# Patient Record
Sex: FEMALE | Race: WHITE | Employment: OTHER | URBAN - METROPOLITAN AREA
[De-identification: names, ages, dates, MRNs, and addresses within clinical notes are randomized per-mention and may not be internally consistent; named-entity substitution may affect disease eponyms.]

---

## 2017-06-01 ENCOUNTER — TELEPHONE (OUTPATIENT)
Dept: NEUROLOGY | Facility: CLINIC | Age: 82
End: 2017-06-01

## 2017-06-01 NOTE — TELEPHONE ENCOUNTER
Pt dtg called about injection to breast. Pt dtg may have been calling regarding trigger point injection that was done to left chest wall in Dec 2016. Message left for Frances Duran to return call.

## 2017-06-01 NOTE — TELEPHONE ENCOUNTER
Dtg called stated that mother had trigger point injection in December that did not help left chest wall pain at all. Dtg stated that pt did want to \"hurt MD feels so has been in constant pain since December.  Pt and dtg want to know if anything else can be

## 2019-05-01 ENCOUNTER — HOSPITAL ENCOUNTER (OUTPATIENT)
Dept: ULTRASOUND IMAGING | Age: 84
Discharge: HOME | End: 2019-05-01
Attending: UROLOGY
Payer: COMMERCIAL

## 2019-05-01 DIAGNOSIS — N13.9 ACUTE UNILATERAL OBSTRUCTIVE UROPATHY: ICD-10-CM

## 2019-05-01 PROCEDURE — 76857 US EXAM PELVIC LIMITED: CPT | Performed by: UROLOGY

## 2019-05-21 ENCOUNTER — LAB REQUISITION (OUTPATIENT)
Dept: LAB | Facility: HOSPITAL | Age: 84
End: 2019-05-21
Payer: COMMERCIAL

## 2019-05-21 DIAGNOSIS — N39.0 URINARY TRACT INFECTION: ICD-10-CM

## 2019-05-21 PROCEDURE — 87186 SC STD MICRODIL/AGAR DIL: CPT | Performed by: UROLOGY

## 2019-05-21 PROCEDURE — 87088 URINE BACTERIA CULTURE: CPT | Performed by: UROLOGY

## 2019-05-21 PROCEDURE — 87086 URINE CULTURE/COLONY COUNT: CPT | Performed by: UROLOGY

## 2019-06-10 ENCOUNTER — TELEPHONE (OUTPATIENT)
Dept: PAIN CLINIC | Facility: HOSPITAL | Age: 84
End: 2019-06-10

## 2019-06-21 ENCOUNTER — OFFICE VISIT (OUTPATIENT)
Dept: PAIN CLINIC | Facility: HOSPITAL | Age: 84
End: 2019-06-21
Attending: ANESTHESIOLOGY
Payer: COMMERCIAL

## 2019-06-21 VITALS
DIASTOLIC BLOOD PRESSURE: 74 MMHG | WEIGHT: 160 LBS | HEART RATE: 67 BPM | SYSTOLIC BLOOD PRESSURE: 136 MMHG | BODY MASS INDEX: 28.35 KG/M2 | HEIGHT: 63 IN

## 2019-06-21 DIAGNOSIS — M79.10 MYALGIA: Primary | ICD-10-CM

## 2019-06-21 DIAGNOSIS — R07.89 LEFT-SIDED CHEST WALL PAIN: ICD-10-CM

## 2019-06-21 PROCEDURE — 99201 HC OUTPT EVAL AND MGNT NEW PT LEVEL 1: CPT

## 2019-06-21 NOTE — PROGRESS NOTES
Presents ambulatory to [facility] for evaluation of left chest wall pain,Referred by PCP . reports pain was caused after she had a cyst removed, \"they cut the nerve\". Reports pain as 8/10, \"nothing helps and nobody has been able to help me\".  She rep

## 2019-06-21 NOTE — CHRONIC PAIN
[facility] HOSP - [facility]  Report of Consultation    Charleen Wang Patient Status:  Outpatient    1934 MRN [id number]   Location [address] [facility] Attending Natalia Gutierrez MD   Hosp Day # 0 PCP Nahid Beach MD

## 2019-06-28 ENCOUNTER — DOCUMENTATION ONLY (OUTPATIENT)
Dept: PAIN CLINIC | Facility: HOSPITAL | Age: 84
End: 2019-06-28

## 2019-06-28 NOTE — PROGRESS NOTES
Procedure code 724 [id number] [id number] for 07/19/19    Nikki Sotomayor @ # [phone] @ 8:36am    Per Automated system no PA needed     Order form sent to the surgery center, confirmation rcv'd

## 2019-08-02 ENCOUNTER — OFFICE VISIT (OUTPATIENT)
Dept: PAIN CLINIC | Facility: HOSPITAL | Age: 84
End: 2019-08-02
Attending: ANESTHESIOLOGY
Payer: COMMERCIAL

## 2019-08-02 DIAGNOSIS — R07.89 LEFT-SIDED CHEST WALL PAIN: Primary | ICD-10-CM

## 2019-08-02 DIAGNOSIS — G58.8 INTERCOSTAL NEURALGIA: ICD-10-CM

## 2019-08-02 PROCEDURE — 99211 OFF/OP EST MAY X REQ PHY/QHP: CPT

## 2019-08-02 NOTE — PROGRESS NOTES
Pt presents to [facility] ambulatory  For f/u after a left chest intercostal nerve block on 07/19/19 with Dr. Rita Joya. Pt states she has 0% relief after injection.  Pt states burning sensation is still presents worst at night and ok in the am. Pt doesn't take medi

## 2019-08-02 NOTE — CHRONIC PAIN
Initial Consultation Note      HISTORY OF PRESENT ILLNESS:  Kevin Vargas is a 80year old old female referred to the pain clinic  for valuation treatment of left intercostal neuralgia eric is a very nice 80-year-old white female who is had a many Social Needs      Financial resource strain: Not on file      Food insecurity:        Worry: Not on file        Inability: Not on file      Transportation needs:        Medical: Not on file        Non-medical: Not on file    Tobacco Use      Smoking status x3  Affect:  NAD  Head: normocephalic, atraumatic  Eyes: anicteric; no injection  Chest: S1, S2, RRR  Respiratory: CTAB  Gait: Normal; cane user - No  Spine: Normal    ROM:   Lumbar spine  Flexion   Extension  Cervical Spine  Flexion   Extension  MOTOR EXA were discussed at length including pharmacotherapy (eg. Anti- inflammatories, muscle relaxants, neuropathic medications, oral steroids, analgesics), injections, and further testing.  Risks and benefits of all options were discussed at length to patients sat

## 2020-01-09 ENCOUNTER — OFFICE VISIT (OUTPATIENT)
Dept: SURGERY | Facility: CLINIC | Age: 85
End: 2020-01-09
Payer: MEDICARE

## 2020-01-09 VITALS
BODY MASS INDEX: 28 KG/M2 | WEIGHT: 160 LBS | HEART RATE: 80 BPM | SYSTOLIC BLOOD PRESSURE: 139 MMHG | DIASTOLIC BLOOD PRESSURE: 75 MMHG

## 2020-01-09 DIAGNOSIS — N32.81 OVERACTIVE BLADDER: Primary | ICD-10-CM

## 2020-01-09 PROCEDURE — G0463 HOSPITAL OUTPT CLINIC VISIT: HCPCS | Performed by: UROLOGY

## 2020-01-09 PROCEDURE — 99204 OFFICE O/P NEW MOD 45 MIN: CPT | Performed by: UROLOGY

## 2020-01-09 RX ORDER — LORAZEPAM 0.5 MG/1
TABLET ORAL
COMMUNITY
Start: 2020-01-02 | End: 2020-10-12

## 2020-01-09 NOTE — PROGRESS NOTES
SUBJECTIVE:  Gege Rodriguez is a 80year old female who presents for a consultation at the request of, and a copy of this note will be sent to, DR. Gamboa, for evaluation of  overactive bladder and ? Pelvic organ prolapse.   She states she had a bladder for pain or chest discomfort, dizziness or fainting, palpitations, leg swelling, nocturia, or claudication. GASTROINTESTINAL:  Negative for nausea, vomiting, diarrhea, constipation, heartburn or indigestion, abdominal pains, bloody or tarry stools.   Leticia Sparks Visit:  Requested Prescriptions      No prescriptions requested or ordered in this encounter       Imaging & Referrals:  None

## 2020-02-04 ENCOUNTER — OFFICE VISIT (OUTPATIENT)
Dept: OBGYN CLINIC | Facility: CLINIC | Age: 85
End: 2020-02-04
Payer: MEDICARE

## 2020-02-04 VITALS
SYSTOLIC BLOOD PRESSURE: 97 MMHG | BODY MASS INDEX: 28 KG/M2 | HEART RATE: 94 BPM | DIASTOLIC BLOOD PRESSURE: 63 MMHG | WEIGHT: 159.19 LBS

## 2020-02-04 DIAGNOSIS — N81.11 CYSTOCELE, MIDLINE: Primary | ICD-10-CM

## 2020-02-04 PROCEDURE — 57160 INSERT PESSARY/OTHER DEVICE: CPT | Performed by: OBSTETRICS & GYNECOLOGY

## 2020-02-04 PROCEDURE — A4561 PESSARY RUBBER, ANY TYPE: HCPCS | Performed by: OBSTETRICS & GYNECOLOGY

## 2020-02-04 PROCEDURE — 99203 OFFICE O/P NEW LOW 30 MIN: CPT | Performed by: OBSTETRICS & GYNECOLOGY

## 2020-02-05 NOTE — PROCEDURES
Pessary    Consent signed. Procedure discussed with patient in detail including indication, risk, benefits, alternatives and complications.       Procedure:  Pessary Type: Ring with support  Size: 3    Rectocele Grade 2  Cystocele Grade 3  Enterocele Grade

## 2020-02-05 NOTE — PROGRESS NOTES
Eliud Patiño is a 80year old female [id number] No LMP recorded. Patient has had a hysterectomy.  Patient presents with:  Urinary Symptoms: C/o bladder leak per Dr. Daisy Carrillo -- urge incontience; occurs if sits for long time & sudden leakage; tried meds but [employer] service: Not on file        Active member of club or organization: Not on file        Attends meetings of clubs or organizations: Not on file        Relationship status: Not on file      Intimate partner violence:        Fear of current or ex par thirst or urination. Heme/Lymph:    denies history of anemia, easy bruising or bleeding.       PHYSICAL EXAM:   BP 97/63   Pulse 94   Wt 159 lb 3.2 oz (72.2 kg)   BMI 28.20 kg/m²   Constitutional:   well developed, well nourished  Head/Face:  normocephalic

## 2020-02-18 ENCOUNTER — HOSPITAL ENCOUNTER (OUTPATIENT)
Dept: MRI IMAGING | Age: 85
Discharge: HOME | End: 2020-02-18
Attending: PAIN MEDICINE
Payer: MEDICARE

## 2020-02-18 DIAGNOSIS — M79.2 NEURITIS: ICD-10-CM

## 2020-02-18 PROCEDURE — 72146 MRI CHEST SPINE W/O DYE: CPT | Performed by: PAIN MEDICINE

## 2020-02-25 ENCOUNTER — LAB ENCOUNTER (OUTPATIENT)
Dept: LAB | Facility: HOSPITAL | Age: 85
End: 2020-02-25
Attending: PAIN MEDICINE
Payer: MEDICARE

## 2020-02-25 ENCOUNTER — OFFICE VISIT (OUTPATIENT)
Dept: OBGYN CLINIC | Facility: CLINIC | Age: 85
End: 2020-02-25
Payer: MEDICARE

## 2020-02-25 VITALS — HEART RATE: 93 BPM | SYSTOLIC BLOOD PRESSURE: 128 MMHG | DIASTOLIC BLOOD PRESSURE: 73 MMHG

## 2020-02-25 DIAGNOSIS — M25.50 PAIN IN JOINT, MULTIPLE SITES: Primary | ICD-10-CM

## 2020-02-25 DIAGNOSIS — N81.11 CYSTOCELE, MIDLINE: Primary | ICD-10-CM

## 2020-02-25 DIAGNOSIS — N39.45 CONTINUOUS LEAKAGE OF URINE: ICD-10-CM

## 2020-02-25 LAB
BASOPHILS # BLD AUTO: 0.04 X10(3) UL (ref 0–0.2)
BASOPHILS NFR BLD AUTO: 0.5 %
DEPRECATED RDW RBC AUTO: 41.3 FL (ref 35.1–46.3)
EOSINOPHIL # BLD AUTO: 0.08 X10(3) UL (ref 0–0.7)
EOSINOPHIL NFR BLD AUTO: 1 %
ERYTHROCYTE [DISTWIDTH] IN BLOOD BY AUTOMATED COUNT: 12.6 % (ref 11–15)
ERYTHROCYTE [SEDIMENTATION RATE] IN BLOOD: 11 MM/HR (ref 0–30)
HCT VFR BLD AUTO: 42.6 % (ref 35–48)
HGB BLD-MCNC: 14.2 G/DL (ref 12–16)
IMM GRANULOCYTES # BLD AUTO: 0.03 X10(3) UL (ref 0–1)
IMM GRANULOCYTES NFR BLD: 0.4 %
LYMPHOCYTES # BLD AUTO: 2.01 X10(3) UL (ref 1–4)
LYMPHOCYTES NFR BLD AUTO: 25.5 %
MCH RBC QN AUTO: 29.8 PG (ref 26–34)
MCHC RBC AUTO-ENTMCNC: 33.3 G/DL (ref 31–37)
MCV RBC AUTO: 89.5 FL (ref 80–100)
MONOCYTES # BLD AUTO: 0.5 X10(3) UL (ref 0.1–1)
MONOCYTES NFR BLD AUTO: 6.3 %
NEUTROPHILS # BLD AUTO: 5.23 X10 (3) UL (ref 1.5–7.7)
NEUTROPHILS # BLD AUTO: 5.23 X10(3) UL (ref 1.5–7.7)
NEUTROPHILS NFR BLD AUTO: 66.3 %
PLATELET # BLD AUTO: 405 10(3)UL (ref 150–450)
RBC # BLD AUTO: 4.76 X10(6)UL (ref 3.8–5.3)
WBC # BLD AUTO: 7.9 X10(3) UL (ref 4–11)

## 2020-02-25 PROCEDURE — 85025 COMPLETE CBC W/AUTO DIFF WBC: CPT

## 2020-02-25 PROCEDURE — 99213 OFFICE O/P EST LOW 20 MIN: CPT | Performed by: OBSTETRICS & GYNECOLOGY

## 2020-02-25 PROCEDURE — 85652 RBC SED RATE AUTOMATED: CPT

## 2020-02-25 PROCEDURE — 36415 COLL VENOUS BLD VENIPUNCTURE: CPT

## 2020-02-25 NOTE — PROGRESS NOTES
Enio Piedra is a 80year old female [id number] No LMP recorded. Patient has had a hysterectomy. Patient presents with:  Gyn Problem: PESSARY CHECK -- did not help. Does not feel any difference. [facility] sent pt to me -- wanted to do botox  .      OBSTETR file        Attends meetings of clubs or organizations: Not on file        Relationship status: Not on file      Intimate partner violence:        Fear of current or ex partner: Not on file        Emotionally abused: Not on file        Physically abused: N bleeding.       PHYSICAL EXAM:   /73   Pulse 93   Constitutional:   well developed, well nourished  Head/Face:  normocephalic  Abdomen:    soft, nontender, nondistended, no masses  Skin/Hair:   no unusual rashes or bruises  Extremities:   no edema, no

## 2020-03-16 ENCOUNTER — OFFICE VISIT (OUTPATIENT)
Dept: UROLOGY | Facility: HOSPITAL | Age: 85
End: 2020-03-16
Attending: OBSTETRICS & GYNECOLOGY
Payer: MEDICARE

## 2020-03-16 VITALS
BODY MASS INDEX: 28.17 KG/M2 | SYSTOLIC BLOOD PRESSURE: 122 MMHG | WEIGHT: 159 LBS | HEIGHT: 63 IN | DIASTOLIC BLOOD PRESSURE: 70 MMHG

## 2020-03-16 DIAGNOSIS — N81.84 PELVIC MUSCLE WASTING: Primary | ICD-10-CM

## 2020-03-16 DIAGNOSIS — N39.41 URGE INCONTINENCE: ICD-10-CM

## 2020-03-16 DIAGNOSIS — N39.3 FEMALE STRESS INCONTINENCE: ICD-10-CM

## 2020-03-16 DIAGNOSIS — N99.3 PROLAPSE OF VAGINAL VAULT AFTER HYSTERECTOMY: ICD-10-CM

## 2020-03-16 DIAGNOSIS — N95.2 POSTMENOPAUSAL ATROPHIC VAGINITIS: ICD-10-CM

## 2020-03-16 LAB
BILIRUB UR QL: NEGATIVE
CLARITY UR: CLEAR
COLOR UR: YELLOW
CONTROL RUN WITHIN 24 HOURS?: YES
GLUCOSE UR-MCNC: NEGATIVE MG/DL
KETONES UR-MCNC: NEGATIVE MG/DL
NITRITE UR QL STRIP.AUTO: NEGATIVE
NITRITE URINE: NEGATIVE
PH UR: 5 [PH] (ref 5–8)
PROT UR-MCNC: NEGATIVE MG/DL
RBC #/AREA URNS AUTO: 1 /HPF
SP GR UR STRIP: 1.02 (ref 1–1.03)
UROBILINOGEN UR STRIP-ACNC: <2
WBC #/AREA URNS AUTO: 7 /HPF

## 2020-03-16 PROCEDURE — 81001 URINALYSIS AUTO W/SCOPE: CPT | Performed by: OBSTETRICS & GYNECOLOGY

## 2020-03-16 PROCEDURE — 87086 URINE CULTURE/COLONY COUNT: CPT | Performed by: OBSTETRICS & GYNECOLOGY

## 2020-03-16 PROCEDURE — 99212 OFFICE O/P EST SF 10 MIN: CPT

## 2020-03-16 NOTE — PROGRESS NOTES
Juan Alberto Cam,   3/16/2020     Referred by Dr. Diana Lopez  Pt here with self    Patient presents with:  Prolapse: referred by Dr. Diana Lopez      HPI:  +RADHA  +UUI  +prolapse  hyst in past  Tried pessary with Ghia, worsened leakage (ring w/ support)  Not sexually a CP  No SOB    GENERAL EXAM:  GENERAL:  Alert and Oriented, and NAD  HEENT:  Normal, no lesions  LUNGS:  Normal effort  HEART:  RRR  ABDOMEN: soft, no mass, no hernia  EXTREM:  Normal, no edema  SKIN:  Normal, no lesions    PELVIC EXAM:  Ext.  Gen: some atro Discussed:  Estrace Cream    Treatment Plan, Non-surgical:   RN teaching/pt education done    Treatment Plan, Surgical:   Uterosacral suspension  Mid-urethral slings (Trans Vaginal Taping, TOT, single-incision)  Sacrocolpopexy-Abd, Robotic, LSC, Vag  Colpe

## 2020-03-18 ENCOUNTER — TELEPHONE (OUTPATIENT)
Dept: UROLOGY | Facility: HOSPITAL | Age: 85
End: 2020-03-18

## 2020-03-21 ENCOUNTER — APPOINTMENT (OUTPATIENT)
Dept: GENERAL RADIOLOGY | Facility: HOSPITAL | Age: 85
End: 2020-03-21
Payer: MEDICARE

## 2020-03-21 ENCOUNTER — HOSPITAL ENCOUNTER (EMERGENCY)
Facility: HOSPITAL | Age: 85
Discharge: HOME | End: 2020-03-21
Attending: EMERGENCY MEDICINE
Payer: MEDICARE

## 2020-03-21 VITALS
WEIGHT: 155 LBS | HEART RATE: 88 BPM | HEIGHT: 63 IN | OXYGEN SATURATION: 95 % | DIASTOLIC BLOOD PRESSURE: 70 MMHG | RESPIRATION RATE: 18 BRPM | BODY MASS INDEX: 27.46 KG/M2 | SYSTOLIC BLOOD PRESSURE: 130 MMHG | TEMPERATURE: 98 F

## 2020-03-21 DIAGNOSIS — J18.9 PNEUMONITIS: Primary | ICD-10-CM

## 2020-03-21 LAB
ANION GAP SERPL CALC-SCNC: 8 MMOL/L (ref 0–18)
BASOPHILS # BLD AUTO: 0.02 X10(3) UL (ref 0–0.2)
BASOPHILS NFR BLD AUTO: 0.2 %
BUN BLD-MCNC: 19 MG/DL (ref 7–18)
BUN/CREAT SERPL: 18.4 (ref 10–20)
CALCIUM BLD-MCNC: 9.4 MG/DL (ref 8.5–10.1)
CHLORIDE SERPL-SCNC: 108 MMOL/L (ref 98–112)
CO2 SERPL-SCNC: 24 MMOL/L (ref 21–32)
CREAT BLD-MCNC: 1.03 MG/DL (ref 0.55–1.02)
DEPRECATED RDW RBC AUTO: 42 FL (ref 35.1–46.3)
EOSINOPHIL # BLD AUTO: 0.06 X10(3) UL (ref 0–0.7)
EOSINOPHIL NFR BLD AUTO: 0.7 %
ERYTHROCYTE [DISTWIDTH] IN BLOOD BY AUTOMATED COUNT: 12.8 % (ref 11–15)
GLUCOSE BLD-MCNC: 138 MG/DL (ref 70–99)
HCT VFR BLD AUTO: 41.2 % (ref 35–48)
HGB BLD-MCNC: 14.1 G/DL (ref 12–16)
IMM GRANULOCYTES # BLD AUTO: 0.02 X10(3) UL (ref 0–1)
IMM GRANULOCYTES NFR BLD: 0.2 %
LYMPHOCYTES # BLD AUTO: 1.75 X10(3) UL (ref 1–4)
LYMPHOCYTES NFR BLD AUTO: 21.7 %
MCH RBC QN AUTO: 30.5 PG (ref 26–34)
MCHC RBC AUTO-ENTMCNC: 34.2 G/DL (ref 31–37)
MCV RBC AUTO: 89.2 FL (ref 80–100)
MONOCYTES # BLD AUTO: 0.58 X10(3) UL (ref 0.1–1)
MONOCYTES NFR BLD AUTO: 7.2 %
NEUTROPHILS # BLD AUTO: 5.62 X10 (3) UL (ref 1.5–7.7)
NEUTROPHILS # BLD AUTO: 5.62 X10(3) UL (ref 1.5–7.7)
NEUTROPHILS NFR BLD AUTO: 70 %
OSMOLALITY SERPL CALC.SUM OF ELEC: 294 MOSM/KG (ref 275–295)
PLATELET # BLD AUTO: 332 10(3)UL (ref 150–450)
POTASSIUM SERPL-SCNC: 3.6 MMOL/L (ref 3.5–5.1)
RBC # BLD AUTO: 4.62 X10(6)UL (ref 3.8–5.3)
SODIUM SERPL-SCNC: 140 MMOL/L (ref 136–145)
TROPONIN I SERPL-MCNC: <0.045 NG/ML (ref ?–0.04)
WBC # BLD AUTO: 8.1 X10(3) UL (ref 4–11)

## 2020-03-21 PROCEDURE — 85025 COMPLETE CBC W/AUTO DIFF WBC: CPT | Performed by: EMERGENCY MEDICINE

## 2020-03-21 PROCEDURE — 93005 ELECTROCARDIOGRAM TRACING: CPT

## 2020-03-21 PROCEDURE — 93010 ELECTROCARDIOGRAM REPORT: CPT | Performed by: EMERGENCY MEDICINE

## 2020-03-21 PROCEDURE — 99284 EMERGENCY DEPT VISIT MOD MDM: CPT

## 2020-03-21 PROCEDURE — 80048 BASIC METABOLIC PNL TOTAL CA: CPT | Performed by: EMERGENCY MEDICINE

## 2020-03-21 PROCEDURE — 85025 COMPLETE CBC W/AUTO DIFF WBC: CPT

## 2020-03-21 PROCEDURE — 84484 ASSAY OF TROPONIN QUANT: CPT | Performed by: EMERGENCY MEDICINE

## 2020-03-21 PROCEDURE — 71045 X-RAY EXAM CHEST 1 VIEW: CPT | Performed by: EMERGENCY MEDICINE

## 2020-03-21 PROCEDURE — 80048 BASIC METABOLIC PNL TOTAL CA: CPT

## 2020-03-21 PROCEDURE — 84484 ASSAY OF TROPONIN QUANT: CPT

## 2020-03-21 PROCEDURE — 36415 COLL VENOUS BLD VENIPUNCTURE: CPT

## 2020-03-21 RX ORDER — MONTELUKAST SODIUM 10 MG/1
10 TABLET ORAL NIGHTLY
Qty: 14 TABLET | Refills: 0 | Status: SHIPPED | OUTPATIENT
Start: 2020-03-21 | End: 2020-04-04

## 2020-03-21 NOTE — ED NOTES
Pt reports she has chronic pain in L upper side that radiates around to anterior chest.  Pt reports having nerve block 2 weeks ago with no improvement in symptoms.  She states she has been to [email or web address] and has had \"every treatment\" for this pain

## 2020-03-21 NOTE — ED INITIAL ASSESSMENT (HPI)
States she had a nerve block 2 weeks ago. Now has burning sensation across her chest and her lungs. States she passed out last night in the bathroom. Lives alone. Unsure how long she was down. Denies hitting head, pain.

## 2020-03-22 NOTE — ED PROVIDER NOTES
Patient Seen in: [facility] AND [facility] Emergency Department    History   Patient presents with:  Chest Pain Angina      HPI    Patient presents to the ED complaining of a burning sensation in her chest for the past several weeks.   Concerned that she has [place] problem noted.     Physical Exam     ED Triage Vitals [03/21/20 1647]   /72   Pulse 96   Resp 20   Temp 98.2 °F (36.8 °C)   Temp src Oral   SpO2 96 %   O2 Device None (Room air)       All measures to prevent infection transmission during my interactio normal limits   TROPONIN I - Normal   CBC WITH DIFFERENTIAL WITH PLATELET    Narrative: The following orders were created for panel order CBC WITH DIFFERENTIAL WITH PLATELET.                   Procedure                               Abnormality contribute to the complexity of this ED evaluation. ED Course: Patient presents to the ED after syncopal episode last night. Likely vasovagal in nature. No head injury evident on exam, patient denies headache.   No concern for cardiac cause given zack

## 2020-06-24 ENCOUNTER — APPOINTMENT (OUTPATIENT)
Dept: GENERAL RADIOLOGY | Age: 85
End: 2020-06-24
Attending: EMERGENCY MEDICINE
Payer: MEDICARE

## 2020-06-24 ENCOUNTER — HOSPITAL ENCOUNTER (OUTPATIENT)
Age: 85
Discharge: HOME | End: 2020-06-24
Attending: EMERGENCY MEDICINE
Payer: MEDICARE

## 2020-06-24 VITALS
RESPIRATION RATE: 18 BRPM | HEART RATE: 88 BPM | TEMPERATURE: 98 F | DIASTOLIC BLOOD PRESSURE: 105 MMHG | SYSTOLIC BLOOD PRESSURE: 141 MMHG | OXYGEN SATURATION: 96 %

## 2020-06-24 DIAGNOSIS — Z20.822 COVID-19 VIRUS TEST RESULT UNKNOWN: ICD-10-CM

## 2020-06-24 DIAGNOSIS — J30.9 ALLERGIC RHINITIS, UNSPECIFIED SEASONALITY, UNSPECIFIED TRIGGER: Primary | ICD-10-CM

## 2020-06-24 PROCEDURE — 99214 OFFICE O/P EST MOD 30 MIN: CPT

## 2020-06-24 PROCEDURE — 71046 X-RAY EXAM CHEST 2 VIEWS: CPT | Performed by: EMERGENCY MEDICINE

## 2020-06-24 RX ORDER — ALBUTEROL SULFATE 90 UG/1
2 AEROSOL, METERED RESPIRATORY (INHALATION) EVERY 4 HOURS PRN
Qty: 1 INHALER | Refills: 0 | Status: SHIPPED | OUTPATIENT
Start: 2020-06-24 | End: 2020-07-24

## 2020-06-24 NOTE — ED PROVIDER NOTES
Patient Seen in: [facility] In [address] [place] [address]    History   Patient presents with:   Allergic Rxn Allergies    Stated Complaint: chest     HPI    Patient here with complaint that she thinks that she might have a fungal infection in the lung 97.5 °F (36.4 °C)   Temp src Temporal   SpO2 96 %   O2 Device None (Room air)       Current:BP (!) 141/105   Pulse 88   Temp 97.5 °F (36.4 °C) (Temporal)   Resp 18   SpO2 96%   PULSE OX   GENERAL: well developed, well nourished, well hydrated, no stridor

## 2020-06-24 NOTE — ED INITIAL ASSESSMENT (HPI)
Pt states she would like to see a doctor because she believes there is mold in her house and states \"I would like to have my lungs checked. \" c/o watery eyes.

## 2020-08-04 ENCOUNTER — LAB ENCOUNTER (OUTPATIENT)
Dept: LAB | Facility: HOSPITAL | Age: 85
End: 2020-08-04
Attending: INTERNAL MEDICINE
Payer: MEDICARE

## 2020-08-04 DIAGNOSIS — Z01.812 ENCOUNTER FOR PREPROCEDURE SCREENING LABORATORY TESTING FOR COVID-19: ICD-10-CM

## 2020-08-04 DIAGNOSIS — Z20.822 ENCOUNTER FOR PREPROCEDURE SCREENING LABORATORY TESTING FOR COVID-19: ICD-10-CM

## 2020-08-05 LAB — SARS-COV-2 RNA RESP QL NAA+PROBE: NOT DETECTED

## 2020-09-02 ENCOUNTER — OFFICE VISIT (OUTPATIENT)
Dept: INTERNAL MEDICINE CLINIC | Facility: CLINIC | Age: 85
End: 2020-09-02
Payer: MEDICARE

## 2020-09-02 VITALS
BODY MASS INDEX: 28.34 KG/M2 | HEIGHT: 62 IN | DIASTOLIC BLOOD PRESSURE: 71 MMHG | SYSTOLIC BLOOD PRESSURE: 134 MMHG | RESPIRATION RATE: 21 BRPM | HEART RATE: 86 BPM | WEIGHT: 154 LBS

## 2020-09-02 DIAGNOSIS — R53.83 OTHER FATIGUE: Primary | ICD-10-CM

## 2020-09-02 DIAGNOSIS — G47.09 OTHER INSOMNIA: ICD-10-CM

## 2020-09-02 PROCEDURE — 99203 OFFICE O/P NEW LOW 30 MIN: CPT | Performed by: INTERNAL MEDICINE

## 2020-09-02 PROCEDURE — G0463 HOSPITAL OUTPT CLINIC VISIT: HCPCS | Performed by: INTERNAL MEDICINE

## 2020-09-02 RX ORDER — HYDROCODONE BITARTRATE AND ACETAMINOPHEN 5; 325 MG/1; MG/1
TABLET ORAL
COMMUNITY
Start: 2020-08-12 | End: 2020-10-12

## 2020-09-02 RX ORDER — MIRTAZAPINE 15 MG/1
15 TABLET, FILM COATED ORAL NIGHTLY
Qty: 30 TABLET | Refills: 1 | Status: SHIPPED | OUTPATIENT
Start: 2020-09-02 | End: 2020-10-12

## 2020-09-02 NOTE — PROGRESS NOTES
HPI:    Patient ID: Chivo Koch is a 80year old female.   Presents as a new patient to address several concerns  HPI  25 years ago patient had cyst removed from the chest wall benign left armpit, since that time she has pain in the chest wall pain w 5-325 MG Oral Tab TAKE 1 2 TO 1 (ONE HALF TO ONE) TABLET BY MOUTH TWICE DAILY     • mirtazapine 15 MG Oral Tab Take 1 tablet (15 mg total) by mouth nightly.  Take  1/2 tab po at  Night for 3 days , increase to  1 tab po at bedtime 30 tablet 1   • LORazepam insomnia patient will start Remeron 15-minute (report in 1 month how she is doing will consider Cymbalta as a next step    Orders Placed This Encounter      CBC W Differential W Platelet [E]      Comp Metabolic Panel (14) [E]      TSH W Reflex To Free T4 [

## 2020-09-03 ENCOUNTER — LAB ENCOUNTER (OUTPATIENT)
Dept: LAB | Age: 85
End: 2020-09-03
Attending: INTERNAL MEDICINE
Payer: MEDICARE

## 2020-09-03 DIAGNOSIS — R53.83 OTHER FATIGUE: ICD-10-CM

## 2020-09-03 LAB
ALBUMIN SERPL-MCNC: 3.5 G/DL (ref 3.4–5)
ALBUMIN/GLOB SERPL: 1 {RATIO} (ref 1–2)
ALP LIVER SERPL-CCNC: 63 U/L (ref 55–142)
ALT SERPL-CCNC: 17 U/L (ref 13–56)
ANION GAP SERPL CALC-SCNC: 8 MMOL/L (ref 0–18)
AST SERPL-CCNC: 13 U/L (ref 15–37)
BASOPHILS # BLD AUTO: 0.03 X10(3) UL (ref 0–0.2)
BASOPHILS NFR BLD AUTO: 0.4 %
BILIRUB SERPL-MCNC: 0.5 MG/DL (ref 0.1–2)
BUN BLD-MCNC: 21 MG/DL (ref 7–18)
BUN/CREAT SERPL: 21.4 (ref 10–20)
CALCIUM BLD-MCNC: 9.5 MG/DL (ref 8.5–10.1)
CHLORIDE SERPL-SCNC: 108 MMOL/L (ref 98–112)
CO2 SERPL-SCNC: 28 MMOL/L (ref 21–32)
CREAT BLD-MCNC: 0.98 MG/DL (ref 0.55–1.02)
DEPRECATED RDW RBC AUTO: 43.8 FL (ref 35.1–46.3)
EOSINOPHIL # BLD AUTO: 0.18 X10(3) UL (ref 0–0.7)
EOSINOPHIL NFR BLD AUTO: 2.6 %
ERYTHROCYTE [DISTWIDTH] IN BLOOD BY AUTOMATED COUNT: 13.1 % (ref 11–15)
GLOBULIN PLAS-MCNC: 3.5 G/DL (ref 2.8–4.4)
GLUCOSE BLD-MCNC: 97 MG/DL (ref 70–99)
HCT VFR BLD AUTO: 40.4 % (ref 35–48)
HGB BLD-MCNC: 13.3 G/DL (ref 12–16)
IMM GRANULOCYTES # BLD AUTO: 0.02 X10(3) UL (ref 0–1)
IMM GRANULOCYTES NFR BLD: 0.3 %
LYMPHOCYTES # BLD AUTO: 2.42 X10(3) UL (ref 1–4)
LYMPHOCYTES NFR BLD AUTO: 35 %
M PROTEIN MFR SERPL ELPH: 7 G/DL (ref 6.4–8.2)
MCH RBC QN AUTO: 30 PG (ref 26–34)
MCHC RBC AUTO-ENTMCNC: 32.9 G/DL (ref 31–37)
MCV RBC AUTO: 91 FL (ref 80–100)
MONOCYTES # BLD AUTO: 0.63 X10(3) UL (ref 0.1–1)
MONOCYTES NFR BLD AUTO: 9.1 %
NEUTROPHILS # BLD AUTO: 3.63 X10 (3) UL (ref 1.5–7.7)
NEUTROPHILS # BLD AUTO: 3.63 X10(3) UL (ref 1.5–7.7)
NEUTROPHILS NFR BLD AUTO: 52.6 %
OSMOLALITY SERPL CALC.SUM OF ELEC: 301 MOSM/KG (ref 275–295)
PATIENT FASTING Y/N/NP: YES
PLATELET # BLD AUTO: 324 10(3)UL (ref 150–450)
POTASSIUM SERPL-SCNC: 4.4 MMOL/L (ref 3.5–5.1)
RBC # BLD AUTO: 4.44 X10(6)UL (ref 3.8–5.3)
SODIUM SERPL-SCNC: 144 MMOL/L (ref 136–145)
TSI SER-ACNC: 1.58 MIU/ML (ref 0.36–3.74)
WBC # BLD AUTO: 6.9 X10(3) UL (ref 4–11)

## 2020-09-03 PROCEDURE — 80053 COMPREHEN METABOLIC PANEL: CPT

## 2020-09-03 PROCEDURE — 84443 ASSAY THYROID STIM HORMONE: CPT

## 2020-09-03 PROCEDURE — 85025 COMPLETE CBC W/AUTO DIFF WBC: CPT

## 2020-09-03 PROCEDURE — 36415 COLL VENOUS BLD VENIPUNCTURE: CPT

## 2020-09-03 RX ORDER — MIRTAZAPINE 15 MG/1
15 TABLET, FILM COATED ORAL NIGHTLY
Qty: 30 TABLET | Refills: 1 | OUTPATIENT
Start: 2020-09-03

## 2020-09-03 RX ORDER — HYDROCODONE BITARTRATE AND ACETAMINOPHEN 5; 325 MG/1; MG/1
TABLET ORAL
Refills: 0 | OUTPATIENT
Start: 2020-09-03

## 2020-09-09 NOTE — PROGRESS NOTES
Patient calling back (verified name and ), advised Dr Archana Akins note and verbalized understanding./        Please call patient normal blood count no anemia, normal thyroid function test, normal sugar kidney liver function test, BUN slightly elevated sign

## 2020-10-08 ENCOUNTER — OFFICE VISIT (OUTPATIENT)
Dept: UROLOGY | Facility: HOSPITAL | Age: 85
End: 2020-10-08
Attending: OBSTETRICS & GYNECOLOGY
Payer: MEDICARE

## 2020-10-08 VITALS
SYSTOLIC BLOOD PRESSURE: 130 MMHG | WEIGHT: 154 LBS | DIASTOLIC BLOOD PRESSURE: 76 MMHG | TEMPERATURE: 98 F | HEIGHT: 62 IN | BODY MASS INDEX: 28.34 KG/M2

## 2020-10-08 DIAGNOSIS — N39.3 FEMALE STRESS INCONTINENCE: Primary | ICD-10-CM

## 2020-10-08 PROCEDURE — 87088 URINE BACTERIA CULTURE: CPT

## 2020-10-08 PROCEDURE — 51797 INTRAABDOMINAL PRESSURE TEST: CPT

## 2020-10-08 PROCEDURE — 87086 URINE CULTURE/COLONY COUNT: CPT

## 2020-10-08 PROCEDURE — 51784 ANAL/URINARY MUSCLE STUDY: CPT

## 2020-10-08 PROCEDURE — 51729 CYSTOMETROGRAM W/VP&UP: CPT

## 2020-10-08 PROCEDURE — 51741 ELECTRO-UROFLOWMETRY FIRST: CPT

## 2020-10-08 PROCEDURE — 87186 SC STD MICRODIL/AGAR DIL: CPT

## 2020-10-08 NOTE — PATIENT INSTRUCTIONS
[facility] [address] [place] [address]    HAVING A URINARY CATHETER AFTER SURGERY    What is a urinary catheter? A catheter is a soft tube used to drain urine from your bladder. Why do I need a catheter?   A urinary catheter is used bladder, try the following tips:  · Urinate normally then stand up, walk around for a minute or two, sit back down on the commode and attempt to urinate (double void). · Sit in a tub of warm water and try to urinate in the tub.   · Run warm water over your resume your normal schedule. You may have mild discomfort for a few hours after your testing today. There may be some mild burning when you urinate or you may see some blood in your urine. These problems should not last more than 24 hours.   The foll

## 2020-10-08 NOTE — PROCEDURES
Patient here for urodynamic testing. Procedure explained and confirmed by patient. See evaluation form for results. Both verbal and written discharge instructions were given.   Patient tolerated procedure well and will follow up with Dr. Swathi Worthy flow     [x]  Intermittent  []  Other  Void:   []  Typical  []  Atypical    Additional Notes:Uroflow obtained unreduced  CYSTOMETRY:  Urethral Catheter:  Fr 7 / tdoc  Abd Catheter:     Fr 7 / tdoc   Infusion:  Water Rate 50 mL/min.  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

## 2020-10-09 ENCOUNTER — OFFICE VISIT (OUTPATIENT)
Dept: UROLOGY | Facility: HOSPITAL | Age: 85
End: 2020-10-09
Attending: OBSTETRICS & GYNECOLOGY
Payer: MEDICARE

## 2020-10-09 VITALS — WEIGHT: 154 LBS | RESPIRATION RATE: 20 BRPM | HEIGHT: 62 IN | TEMPERATURE: 98 F | BODY MASS INDEX: 28.34 KG/M2

## 2020-10-09 DIAGNOSIS — N32.81 DETRUSOR INSTABILITY: Primary | ICD-10-CM

## 2020-10-09 DIAGNOSIS — N39.3 FEMALE STRESS INCONTINENCE: ICD-10-CM

## 2020-10-09 PROCEDURE — 99212 OFFICE O/P EST SF 10 MIN: CPT

## 2020-10-09 RX ORDER — TROSPIUM CHLORIDE ER 60 MG/1
60 CAPSULE ORAL DAILY
Qty: 90 CAPSULE | Refills: 3 | Status: SHIPPED | OUTPATIENT
Start: 2020-10-09 | End: 2020-10-12

## 2020-10-09 NOTE — PATIENT INSTRUCTIONS
[facility]    PELVIC MUSCLE EXERCISES \[facility]\"")    The muscles that surround the vagina help to support the pelvic organs and maintain bladder and bowel control are called the “pelvic floor muscles”.   Exercis maintain constant effort in an active squeeze in order to strengthen the muscles. It is better to maintain a strong active squeeze for a short period of time that to flick the muscle on and off for any period of time.   You will need to work up to a full 1 benefits. INSTRUCTIONS:  1. Start with going to the bathroom every 2 hr (s) during waking hours. Go into the bathroom and sit on the toilet even if you don’t feel the urge. Relax a moment allow yourself to urinate. Run the water if necessary.     2. I track of bladder symptoms.     Alcoholic beverages Mayonnaise   Apples Guava   Aspartate (Nutrasweet) Lemon juice   Avocados Peaches   [place] Plus   Carbonated drinks Strawberries   Chilies/Spicy foods Tea   Citrus fruits Tomatoes   Coffee Vinegar   Cr

## 2020-10-09 NOTE — PROGRESS NOTES
Pt presents w/ initial c/o bulge, UI  Urodynamic testing undergone without complication.   Results reviewed with patient  141/25cc & 140/40cc  half-way 400cc  +DO @ 230cc  +RADHA @300 reduced    Discussed with patient mgmt options for bulge, DO/UUI, RADHA    Discuss

## 2020-10-12 ENCOUNTER — HOSPITAL ENCOUNTER (OUTPATIENT)
Dept: CT IMAGING | Age: 85
Discharge: HOME | End: 2020-10-12
Attending: INTERNAL MEDICINE
Payer: MEDICARE

## 2020-10-12 ENCOUNTER — TELEPHONE (OUTPATIENT)
Dept: UROLOGY | Facility: HOSPITAL | Age: 85
End: 2020-10-12

## 2020-10-12 ENCOUNTER — OFFICE VISIT (OUTPATIENT)
Dept: INTERNAL MEDICINE CLINIC | Facility: CLINIC | Age: 85
End: 2020-10-12
Payer: MEDICARE

## 2020-10-12 VITALS
HEART RATE: 80 BPM | BODY MASS INDEX: 29 KG/M2 | RESPIRATION RATE: 20 BRPM | SYSTOLIC BLOOD PRESSURE: 143 MMHG | WEIGHT: 157 LBS | DIASTOLIC BLOOD PRESSURE: 73 MMHG

## 2020-10-12 DIAGNOSIS — G47.09 OTHER INSOMNIA: Primary | ICD-10-CM

## 2020-10-12 DIAGNOSIS — F41.9 ANXIETY: ICD-10-CM

## 2020-10-12 DIAGNOSIS — G44.89 OTHER HEADACHE SYNDROME: ICD-10-CM

## 2020-10-12 DIAGNOSIS — G93.9 BRAIN LESION: ICD-10-CM

## 2020-10-12 DIAGNOSIS — N39.41 URGE INCONTINENCE: Primary | ICD-10-CM

## 2020-10-12 PROCEDURE — G0463 HOSPITAL OUTPT CLINIC VISIT: HCPCS | Performed by: INTERNAL MEDICINE

## 2020-10-12 PROCEDURE — 70450 CT HEAD/BRAIN W/O DYE: CPT | Performed by: INTERNAL MEDICINE

## 2020-10-12 PROCEDURE — 99214 OFFICE O/P EST MOD 30 MIN: CPT | Performed by: INTERNAL MEDICINE

## 2020-10-12 RX ORDER — OXYBUTYNIN CHLORIDE 10 MG/1
10 TABLET, EXTENDED RELEASE ORAL DAILY
Qty: 90 TABLET | Refills: 0 | Status: SHIPPED | OUTPATIENT
Start: 2020-10-12 | End: 2020-11-20 | Stop reason: ALTCHOICE

## 2020-10-12 RX ORDER — NITROFURANTOIN 25; 75 MG/1; MG/1
100 CAPSULE ORAL 2 TIMES DAILY
Qty: 14 CAPSULE | Refills: 0 | Status: SHIPPED | OUTPATIENT
Start: 2020-10-12 | End: 2020-10-12

## 2020-10-12 RX ORDER — MIRTAZAPINE 15 MG/1
15 TABLET, FILM COATED ORAL NIGHTLY
Qty: 90 TABLET | Refills: 1 | Status: SHIPPED | OUTPATIENT
Start: 2020-10-12 | End: 2021-01-30

## 2020-10-12 NOTE — TELEPHONE ENCOUNTER
Called patient today. Informed her that the urine culture obtained on 10/08/2020 showed 10,000-50,000 Escherichia coli. TORTOMAS Cervantes 100 mg po x 7 days sent to her preferred pharmacy. Patient verbalized understanding.  Encouraged patient to c

## 2020-10-13 NOTE — PROGRESS NOTES
HPI:    Patient ID: Lei Glass is a 80year old female presents for follow-up on insomnia, unsteadiness    HPI  Patient was seen in the office about a month ago prescribed mirtazapine 15 mg taken at bedtime, she states that she is sleeping better s 1/2 -1 ta bpo every 12 hours as needed for pain 60 tablet 0     Allergies:  Penicillins             HALLUCINATION  Advil [Ibuprofen]       RASH  Lyrica [Pregabalin]     RASH  Norco [Hydrocodone-*    RASH   /73 (BP Location: Left arm, Patient Position Refills   • mirtazapine 15 MG Oral Tab 90 tablet 1     Sig: Take 1 tablet (15 mg total) by mouth nightly.  Take  1/2 tab po at  Night for 3 days , increase to  1 tab po at bedtime   • Sertraline HCl 50 MG Oral Tab 90 tablet 1     Sig: Take 1 tablet (50 mg t

## 2020-10-18 ENCOUNTER — HOSPITAL ENCOUNTER (OUTPATIENT)
Dept: MRI IMAGING | Age: 85
Discharge: HOME | End: 2020-10-18
Attending: INTERNAL MEDICINE
Payer: MEDICARE

## 2020-10-18 DIAGNOSIS — G47.09 OTHER INSOMNIA: ICD-10-CM

## 2020-10-18 DIAGNOSIS — G93.9 BRAIN LESION: ICD-10-CM

## 2020-10-18 DIAGNOSIS — F41.9 ANXIETY: ICD-10-CM

## 2020-10-18 PROCEDURE — 70553 MRI BRAIN STEM W/O & W/DYE: CPT | Performed by: INTERNAL MEDICINE

## 2020-10-18 PROCEDURE — A9575 INJ GADOTERATE MEGLUMI 0.1ML: HCPCS | Performed by: INTERNAL MEDICINE

## 2020-10-19 ENCOUNTER — TELEPHONE (OUTPATIENT)
Dept: INTERNAL MEDICINE CLINIC | Facility: CLINIC | Age: 85
End: 2020-10-19

## 2020-10-19 NOTE — TELEPHONE ENCOUNTER
called asking if we can get patient in to see neurosurgery this week in [facility].    I called 's office and patient has an appointment on 10-23-20 at 9:15 am at [address] [place] [address].  In [address] to patient and she is aware she n

## 2020-10-19 NOTE — TELEPHONE ENCOUNTER
Spoke to patient earlier today made her aware that she has brain tumor which requires further evaluation, patient preferred closest location and no driving too far, she will need to ask her son-in-law to drive him.   I did advise that she should bring her d

## 2020-10-24 ENCOUNTER — TELEPHONE (OUTPATIENT)
Dept: INTERNAL MEDICINE CLINIC | Facility: CLINIC | Age: 85
End: 2020-10-24

## 2020-10-26 ENCOUNTER — TELEPHONE (OUTPATIENT)
Dept: INTERNAL MEDICINE CLINIC | Facility: CLINIC | Age: 85
End: 2020-10-26

## 2020-10-26 NOTE — TELEPHONE ENCOUNTER
Spoke to pt  And Kaci Malone patient's about patient condition, she was advised not to have surgical intervention by neurosurgeon   Because f the location of the  Tumor ,/ she does not  want second opinion/ meningioma can grow and most likely will cause more pr

## 2020-10-26 NOTE — TELEPHONE ENCOUNTER
Patient son in law calling and states the patient will like a call back to discuss some lab issues     [phone]      Please advise

## 2020-10-30 ENCOUNTER — LAB ENCOUNTER (OUTPATIENT)
Dept: LAB | Age: 85
End: 2020-10-30
Attending: INTERNAL MEDICINE
Payer: MEDICARE

## 2020-10-30 ENCOUNTER — NURSE TRIAGE (OUTPATIENT)
Dept: INTERNAL MEDICINE CLINIC | Facility: CLINIC | Age: 85
End: 2020-10-30

## 2020-10-30 ENCOUNTER — OFFICE VISIT (OUTPATIENT)
Dept: INTERNAL MEDICINE CLINIC | Facility: CLINIC | Age: 85
End: 2020-10-30
Payer: MEDICARE

## 2020-10-30 VITALS
BODY MASS INDEX: 28.71 KG/M2 | DIASTOLIC BLOOD PRESSURE: 64 MMHG | RESPIRATION RATE: 20 BRPM | HEART RATE: 83 BPM | WEIGHT: 156 LBS | HEIGHT: 62 IN | SYSTOLIC BLOOD PRESSURE: 107 MMHG

## 2020-10-30 DIAGNOSIS — D32.0 MENINGIOMA OF CEREBELLUM (HCC): ICD-10-CM

## 2020-10-30 DIAGNOSIS — K62.5 RECTAL BLEED: Primary | ICD-10-CM

## 2020-10-30 DIAGNOSIS — Z71.89 ADVANCE DIRECTIVE DISCUSSED WITH PATIENT: ICD-10-CM

## 2020-10-30 DIAGNOSIS — K62.5 RECTAL BLEED: ICD-10-CM

## 2020-10-30 PROCEDURE — 36415 COLL VENOUS BLD VENIPUNCTURE: CPT

## 2020-10-30 PROCEDURE — 99214 OFFICE O/P EST MOD 30 MIN: CPT | Performed by: INTERNAL MEDICINE

## 2020-10-30 PROCEDURE — 85025 COMPLETE CBC W/AUTO DIFF WBC: CPT

## 2020-10-30 PROCEDURE — G0463 HOSPITAL OUTPT CLINIC VISIT: HCPCS | Performed by: INTERNAL MEDICINE

## 2020-10-30 RX ORDER — OMEPRAZOLE 20 MG/1
20 CAPSULE, DELAYED RELEASE ORAL
Qty: 90 CAPSULE | Refills: 0 | Status: SHIPPED | OUTPATIENT
Start: 2020-10-30 | End: 2021-12-14

## 2020-10-30 NOTE — TELEPHONE ENCOUNTER
Action Requested: Summary for Provider     []  Critical Lab, Recommendations Needed  [x] Need Additional Advice  []   FYI    []   Need Orders  [] Need Medications Sent to Pharmacy  []  Other     SUMMARY: Patient requesting to come into office for blood in

## 2020-11-01 NOTE — PROGRESS NOTES
HPI:    Patient ID: Leopoldo Southerly is a 80year old female.   Presents for evaluation of the rectal bleeding, follow-up on dizziness    HPI  Patient reports that yesterday she had 2 episodes of bowel movement with fresh blood coming with a stool in the mg total) by mouth every morning before breakfast. 90 capsule 0   • mirtazapine 15 MG Oral Tab Take 1 tablet (15 mg total) by mouth nightly.  Take  1/2 tab po at  Night for 3 days , increase to  1 tab po at bedtime 90 tablet 1   • Sertraline HCl 50 MG Oral and power of  for healthcare, she states that she has a daughter who seems does not care about what is going on with her, she will probably designate her friend's a #1 power of  for healthcare.   Requested that patient provides us a copy of

## 2020-11-13 ENCOUNTER — VIRTUAL PHONE E/M (OUTPATIENT)
Dept: UROLOGY | Facility: HOSPITAL | Age: 85
End: 2020-11-13
Attending: OBSTETRICS & GYNECOLOGY
Payer: MEDICARE

## 2020-11-13 VITALS — WEIGHT: 156 LBS | BODY MASS INDEX: 27.64 KG/M2 | HEIGHT: 63 IN

## 2020-11-13 DIAGNOSIS — N39.41 URGE INCONTINENCE: Primary | ICD-10-CM

## 2020-11-13 DIAGNOSIS — N32.81 DETRUSOR INSTABILITY: ICD-10-CM

## 2020-11-13 RX ORDER — LORAZEPAM 0.5 MG/1
0.5 TABLET ORAL NIGHTLY
COMMUNITY
End: 2020-12-05

## 2020-11-13 NOTE — PROGRESS NOTES
Patient to follow up DO/UUI  Given circumstances surrounding COVID-19 this visit is being conducted as a televisit with pt's consent.     She is currently using oxybutynin ER 10mg daily  trospium XR 60mg daily was too expensive, didn't take it  Tried detrol

## 2020-11-16 ENCOUNTER — TELEPHONE (OUTPATIENT)
Dept: UROLOGY | Facility: HOSPITAL | Age: 85
End: 2020-11-16

## 2020-11-17 ENCOUNTER — TELEPHONE (OUTPATIENT)
Dept: UROLOGY | Facility: HOSPITAL | Age: 85
End: 2020-11-17

## 2020-11-19 ENCOUNTER — TELEPHONE (OUTPATIENT)
Dept: UROLOGY | Facility: HOSPITAL | Age: 85
End: 2020-11-19

## 2020-11-19 NOTE — TELEPHONE ENCOUNTER
Called Plan  to start  Prior authorization for Trospium 60 mg ER daily. Informed that patient has tried oxybutynin,detrol, and Myrbetriq without resolve. Summited prior authorization.

## 2020-11-20 ENCOUNTER — TELEPHONE (OUTPATIENT)
Dept: UROLOGY | Facility: HOSPITAL | Age: 85
End: 2020-11-20

## 2020-11-20 RX ORDER — TROSPIUM CHLORIDE ER 60 MG/1
60 CAPSULE ORAL DAILY
Qty: 30 CAPSULE | Refills: 3 | Status: SHIPPED | OUTPATIENT
Start: 2020-11-20 | End: 2020-12-04

## 2020-11-20 NOTE — TELEPHONE ENCOUNTER
Called patient. Informed patient the medication was authorized through her insurance. Trospium Chloride ER 60 mg po was sent to her preferred pharmacy. [facility] in [place]. Patient to discontinue Oxybutynin and start Trospium Chloride ER 60 mg po daily

## 2020-11-24 ENCOUNTER — NURSE ONLY (OUTPATIENT)
Dept: HEMATOLOGY/ONCOLOGY | Facility: HOSPITAL | Age: 85
End: 2020-11-24
Attending: NEUROLOGICAL SURGERY
Payer: MEDICARE

## 2020-11-24 ENCOUNTER — OFFICE VISIT (OUTPATIENT)
Dept: NEUROLOGY | Facility: CLINIC | Age: 85
End: 2020-11-24
Payer: MEDICARE

## 2020-11-24 VITALS
HEART RATE: 95 BPM | WEIGHT: 154 LBS | SYSTOLIC BLOOD PRESSURE: 172 MMHG | BODY MASS INDEX: 27 KG/M2 | TEMPERATURE: 98 F | DIASTOLIC BLOOD PRESSURE: 91 MMHG | OXYGEN SATURATION: 97 % | RESPIRATION RATE: 18 BRPM

## 2020-11-24 VITALS — SYSTOLIC BLOOD PRESSURE: 171 MMHG | DIASTOLIC BLOOD PRESSURE: 76 MMHG

## 2020-11-24 DIAGNOSIS — R26.81 GAIT INSTABILITY: ICD-10-CM

## 2020-11-24 DIAGNOSIS — D32.9 MENINGIOMA (HCC): Primary | ICD-10-CM

## 2020-11-24 PROCEDURE — 99211 OFF/OP EST MAY X REQ PHY/QHP: CPT

## 2020-11-24 PROCEDURE — 99204 OFFICE O/P NEW MOD 45 MIN: CPT | Performed by: NEUROLOGICAL SURGERY

## 2020-11-24 NOTE — PROGRESS NOTES
Patient: Uma Ortiz  Medical Record Number: [id number]  YOB: 1934  PCP: Bimal Chandra MD    Referring Physician: Dr. Jackeline Conrad   Reason for visit: Meningioma, poor balance     Dear Dr. Jackeline Conrad,  Thank you very much for requesting this co Socioeconomic History      Marital status:       Spouse name: Not on file      Number of children: Not on file      Years of education: Not on file      Highest education level: Not on file    Tobacco Use      Smoking status: Never Smoker      Smok appropriately. Very mild left pronator drift. RAUL intact. Finger to nose intact. No clonus. Hearing intact. Facial sensation intact. EOM's intact, reports no double vision during exam. No nystagmus horizontally.  Good coordination of bilateral lower extremi atrophy. BRAINSTEM:    The craniocervical junction is uncompromised. CALVARIUM:    There is no apparent fracture, mass, or other significant visible lesion. SINUSES:          Limited views demonstrate no significant mucosal thickening or fluid. Oncology:    - Referred to Dr. Ally Sears or next available partner for assessment for radiation treatment of most likely meningioma. Dr. Ally Sears aware, his office will reach out to the patient.    4. Follow up after radiation treatment or call or follow up sooner o

## 2020-11-30 ENCOUNTER — TELEPHONE (OUTPATIENT)
Dept: UROLOGY | Facility: HOSPITAL | Age: 85
End: 2020-11-30

## 2020-11-30 NOTE — TELEPHONE ENCOUNTER
Patient called today stating. \" I just feel bad. \" I was seeing double, felt dizzy and I was having  trouble swallowing. \" I stopped taking the medication prescribed by Dr. Yanely Ordonez. I have decided not to take the medication prescribed by Dr. Yanely Ordonez.  P

## 2020-12-04 ENCOUNTER — OFFICE VISIT (OUTPATIENT)
Dept: RADIATION ONCOLOGY | Facility: HOSPITAL | Age: 85
End: 2020-12-04
Attending: RADIOLOGY
Payer: MEDICARE

## 2020-12-04 ENCOUNTER — OFFICE VISIT (OUTPATIENT)
Dept: INTERNAL MEDICINE CLINIC | Facility: CLINIC | Age: 85
End: 2020-12-04
Payer: MEDICARE

## 2020-12-04 VITALS
WEIGHT: 155.19 LBS | OXYGEN SATURATION: 99 % | TEMPERATURE: 97 F | BODY MASS INDEX: 27 KG/M2 | SYSTOLIC BLOOD PRESSURE: 159 MMHG | RESPIRATION RATE: 18 BRPM | DIASTOLIC BLOOD PRESSURE: 70 MMHG | HEART RATE: 76 BPM

## 2020-12-04 VITALS
WEIGHT: 154.63 LBS | DIASTOLIC BLOOD PRESSURE: 72 MMHG | HEART RATE: 88 BPM | BODY MASS INDEX: 27 KG/M2 | SYSTOLIC BLOOD PRESSURE: 130 MMHG

## 2020-12-04 DIAGNOSIS — G47.09 OTHER INSOMNIA: ICD-10-CM

## 2020-12-04 DIAGNOSIS — F41.9 ANXIETY: ICD-10-CM

## 2020-12-04 DIAGNOSIS — R27.0 ATAXIA: ICD-10-CM

## 2020-12-04 DIAGNOSIS — D32.0 MENINGIOMA, CEREBRAL (HCC): Primary | ICD-10-CM

## 2020-12-04 DIAGNOSIS — D32.0 MENINGIOMA OF CEREBELLUM (HCC): Primary | ICD-10-CM

## 2020-12-04 PROCEDURE — G0463 HOSPITAL OUTPT CLINIC VISIT: HCPCS | Performed by: INTERNAL MEDICINE

## 2020-12-04 PROCEDURE — 99214 OFFICE O/P EST MOD 30 MIN: CPT | Performed by: INTERNAL MEDICINE

## 2020-12-04 PROCEDURE — 99212 OFFICE O/P EST SF 10 MIN: CPT

## 2020-12-04 NOTE — PROGRESS NOTES
HPI:    Patient ID: Manny Small is a 80year old female comes for follow-up concern of elevated blood pressure    HPI  Patient was diagnosed with a meningioma, sought second opinion, was advised to get radiation treatment, she had consult today with Tab Take 0.5 mg by mouth nightly.      • omeprazole 20 MG Oral Capsule Delayed Release Take 1 capsule (20 mg total) by mouth every morning before breakfast. (Patient not taking: Reported on 12/4/2020 ) 90 capsule 0   • mirtazapine 15 MG Oral Tab Take 1 tabl drive  Elevated blood pressure most likely due to whitecoat hypertension, she will be undergoing radiation treatment vitals will be taken she will report if it stays elevated, so we can start her on medications  Patient is seen in the presence of son-in-la

## 2020-12-04 NOTE — PROGRESS NOTES
Nursing Consultation Note  Patient: Maria R Osuna  YOB: 1934  Age: 80year old  Radiation Oncologist: Dr. Adithya Diaz  Referring Physician: [email or web address]  Consult Date: 12/4/2020      Chemotherapy: None  Labs: Take 1 tablet (15 mg total) by mouth nightly. Take  1/2 tab po at  Night for 3 days , increase to  1 tab po at bedtime (Patient not taking: Reported on 12/4/2020 ) 90 tablet 1   • Sertraline HCl 50 MG Oral Tab Take 1 tablet (50 mg total) by mouth daily.  (P clubs or organizations: Not on file        Relationship status: Not on file      Intimate partner violence        Fear of current or ex partner: Not on file        Emotionally abused: Not on file        Physically abused: Not on file        Forced sexual a Primary language:  English  Language line required?  no  Comprehension Ability:  excellent  Able to read?  yes  Able to write? yes  Communication tools:  None  Patient's ability to learn:  excellent  Readiness to learn:   Motivated  Learning preference

## 2020-12-04 NOTE — PATIENT INSTRUCTIONS
Ct simulation for radiation treatment will be on 12/7/2020 at 1000, here at the [address] [facility]. For any questions related to radiation feel free to call [phone].

## 2020-12-04 NOTE — TELEPHONE ENCOUNTER
Patient states she was seen by Dr. Cally Quevedo today and was advised that she would be prescribing Lorazepam. The patient is following up and  requesting medication refill, she is almost out of the medication.     LORazepam 0.5 MG Oral Tab

## 2020-12-06 RX ORDER — LORAZEPAM 0.5 MG/1
0.5 TABLET ORAL NIGHTLY
Qty: 30 TABLET | Refills: 3 | Status: SHIPPED | OUTPATIENT
Start: 2020-12-06 | End: 2021-04-20

## 2020-12-07 ENCOUNTER — APPOINTMENT (OUTPATIENT)
Dept: RADIATION ONCOLOGY | Facility: HOSPITAL | Age: 85
End: 2020-12-07
Attending: RADIOLOGY
Payer: MEDICARE

## 2020-12-07 DIAGNOSIS — D32.9 BENIGN NEOPLASM OF MENINGES (HCC): Primary | ICD-10-CM

## 2020-12-07 PROCEDURE — 77334 RADIATION TREATMENT AID(S): CPT | Performed by: RADIOLOGY

## 2020-12-08 PROCEDURE — 77399 UNLISTED PX MED RADJ PHYSICS: CPT | Performed by: RADIOLOGY

## 2020-12-08 NOTE — CONSULTS
[facility]    PATIENT'S NAME: Sarahy Ayala   RADIATION ONCOLOGIST: Eleni Espinoza.  Cyndee Bishop MD   PATIENT ACCOUNT #: [de-identified] LOCATION: [address] [place] [address] RECORD #: [id number] YOB: 1934   CONSULTATION DATE: 12/04/2020 the loss of balance at times. PAST MEDICAL HISTORY:  The patient has a past history of hyperlipidemia and skin cancer from the left hand. PAST SURGICAL HISTORY:  Appendectomy, hysterectomy, and right knee arthroscopy.      MEDICATIONS:  Lorazepam, mi overwhelmingly likely to represent a meningioma based upon its radiographic characteristics. RECOMMENDATIONS:  I do believe the patient is a reasonable candidate for fractionated radiotherapy.   While meningioma is generally a surgical disease, there are visual apparatus that is nearby and necrosis could be catastrophic in either of these locations.   Still, given the dose and fractionation scheme that I will employ, it is statistically quite unlikely that she would have any type of catastrophic side effect

## 2020-12-10 PROCEDURE — 77301 RADIOTHERAPY DOSE PLAN IMRT: CPT | Performed by: RADIOLOGY

## 2020-12-10 PROCEDURE — 77338 DESIGN MLC DEVICE FOR IMRT: CPT | Performed by: RADIOLOGY

## 2020-12-10 PROCEDURE — 77300 RADIATION THERAPY DOSE PLAN: CPT | Performed by: RADIOLOGY

## 2020-12-16 ENCOUNTER — APPOINTMENT (OUTPATIENT)
Dept: RADIATION ONCOLOGY | Facility: HOSPITAL | Age: 85
End: 2020-12-16
Attending: RADIOLOGY
Payer: MEDICARE

## 2020-12-16 PROCEDURE — 77386 HC IMRT COMPLEX: CPT | Performed by: RADIOLOGY

## 2020-12-17 PROCEDURE — 77386 HC IMRT COMPLEX: CPT | Performed by: RADIOLOGY

## 2020-12-18 PROCEDURE — 77386 HC IMRT COMPLEX: CPT | Performed by: RADIOLOGY

## 2020-12-21 PROCEDURE — 77386 HC IMRT COMPLEX: CPT | Performed by: RADIOLOGY

## 2020-12-22 ENCOUNTER — OFFICE VISIT (OUTPATIENT)
Dept: RADIATION ONCOLOGY | Facility: HOSPITAL | Age: 85
End: 2020-12-22
Attending: RADIOLOGY
Payer: MEDICARE

## 2020-12-22 ENCOUNTER — TELEPHONE (OUTPATIENT)
Dept: INTERNAL MEDICINE CLINIC | Facility: CLINIC | Age: 85
End: 2020-12-22

## 2020-12-22 VITALS
OXYGEN SATURATION: 99 % | DIASTOLIC BLOOD PRESSURE: 65 MMHG | TEMPERATURE: 97 F | SYSTOLIC BLOOD PRESSURE: 142 MMHG | RESPIRATION RATE: 18 BRPM | HEART RATE: 91 BPM

## 2020-12-22 DIAGNOSIS — D32.0 MENINGIOMA, CEREBRAL (HCC): Primary | ICD-10-CM

## 2020-12-22 PROCEDURE — 77386 HC IMRT COMPLEX: CPT | Performed by: RADIOLOGY

## 2020-12-22 NOTE — PROGRESS NOTES
[facility] Radiation Treatment Management Note 1-5    Patient:  Belinda Carter  Age:  80year old  Visit Diagnosis:  R CPA meningioma   Primary Rad/Onc:  Dr. Dylan Shahid    Site Delivered Dose (cGy) Prescribed Dose (cGy) Ambika Kidd

## 2020-12-22 NOTE — TELEPHONE ENCOUNTER
Verified name and .   Patient requesting telephone visit to discuss possible side effects of the following medication:  Medication Quantity Refills Start End   mirtazapine 15 MG Oral Tab 90 tablet 1 10/12/2020    Sig:   Take 1 tablet (15 mg total) by hui

## 2020-12-23 PROCEDURE — 77386 HC IMRT COMPLEX: CPT | Performed by: RADIOLOGY

## 2020-12-24 PROCEDURE — 77386 HC IMRT COMPLEX: CPT | Performed by: RADIOLOGY

## 2020-12-24 PROCEDURE — 77336 RADIATION PHYSICS CONSULT: CPT | Performed by: RADIOLOGY

## 2020-12-24 NOTE — PROGRESS NOTES
Virtual Telephone Check-In    Enio Piedra verbally consents to a Virtual/Telephone Check-In visit on 12/24/2020      Patient understands and accepts financial responsibility for any deductible, co-insurance and/or co-pays associated with this servic days , increase to  1 tab po at bedtime 90 tablet 1     Allergies:  Penicillins             HALLUCINATION  Lyrica [Pregabalin]     RASH  Norco [Hydrocodone-*    RASH      Physical Exam   Patient alert oriented x3, speaks in full sentences does not sound in

## 2020-12-28 PROCEDURE — 77386 HC IMRT COMPLEX: CPT | Performed by: RADIOLOGY

## 2020-12-29 ENCOUNTER — OFFICE VISIT (OUTPATIENT)
Dept: RADIATION ONCOLOGY | Facility: HOSPITAL | Age: 85
End: 2020-12-29
Attending: RADIOLOGY
Payer: MEDICARE

## 2020-12-29 VITALS
SYSTOLIC BLOOD PRESSURE: 169 MMHG | HEART RATE: 85 BPM | RESPIRATION RATE: 18 BRPM | DIASTOLIC BLOOD PRESSURE: 80 MMHG | TEMPERATURE: 97 F

## 2020-12-29 DIAGNOSIS — D32.0 MENINGIOMA, CEREBRAL (HCC): Primary | ICD-10-CM

## 2020-12-29 PROCEDURE — 77386 HC IMRT COMPLEX: CPT | Performed by: RADIOLOGY

## 2020-12-29 NOTE — PROGRESS NOTES
[facility] Radiation Treatment Management Note 6-10    Patient:  Harris Isidro  Age:  80year old  Visit Diagnosis:    1.  Meningioma, cerebral [facility])      Primary Rad/Onc:  Dr. Melida Austin    Site Delivered Dose (cGy) Prescrib

## 2020-12-30 ENCOUNTER — NURSE TRIAGE (OUTPATIENT)
Dept: INTERNAL MEDICINE CLINIC | Facility: CLINIC | Age: 85
End: 2020-12-30

## 2020-12-30 PROCEDURE — 77386 HC IMRT COMPLEX: CPT | Performed by: RADIOLOGY

## 2020-12-30 NOTE — TELEPHONE ENCOUNTER
Action Requested: Summary for Provider     []  Critical Lab, Recommendations Needed  [x] Need Additional Advice  []   FYI    []   Need Orders  [] Need Medications Sent to Pharmacy  []  Other     SUMMARY: Patient calling with complaint of loss of taste  and

## 2020-12-30 NOTE — TELEPHONE ENCOUNTER
Spoke to patient, discussed his symptoms, right arm, food taste bad, no other symptoms, possibly having Candida in the mouth, undergoing radiation treatment, will treat you with Mycelex Mac she for 10 days, asked patient to report back in 7 to 10 days i

## 2020-12-31 PROCEDURE — 77386 HC IMRT COMPLEX: CPT | Performed by: RADIOLOGY

## 2020-12-31 PROCEDURE — 77336 RADIATION PHYSICS CONSULT: CPT | Performed by: RADIOLOGY

## 2021-01-01 ENCOUNTER — APPOINTMENT (OUTPATIENT)
Dept: RADIATION ONCOLOGY | Facility: HOSPITAL | Age: 86
End: 2021-01-01
Attending: RADIOLOGY
Payer: MEDICARE

## 2021-01-04 PROCEDURE — 77386 HC IMRT COMPLEX: CPT | Performed by: RADIOLOGY

## 2021-01-05 ENCOUNTER — OFFICE VISIT (OUTPATIENT)
Dept: RADIATION ONCOLOGY | Facility: HOSPITAL | Age: 86
End: 2021-01-05
Attending: RADIOLOGY
Payer: MEDICARE

## 2021-01-05 VITALS
WEIGHT: 153.19 LBS | DIASTOLIC BLOOD PRESSURE: 70 MMHG | HEIGHT: 63 IN | HEART RATE: 76 BPM | TEMPERATURE: 98 F | RESPIRATION RATE: 18 BRPM | BODY MASS INDEX: 27.14 KG/M2 | SYSTOLIC BLOOD PRESSURE: 151 MMHG

## 2021-01-05 DIAGNOSIS — D32.0 MENINGIOMA, CEREBRAL (HCC): Primary | ICD-10-CM

## 2021-01-05 PROCEDURE — 77386 HC IMRT COMPLEX: CPT | Performed by: RADIOLOGY

## 2021-01-05 NOTE — PROGRESS NOTES
[facility] Radiation Treatment Management Note 11-15    Patient:  Leann Arce  Age:  80year old  Visit Diagnosis:    1.  Meningioma, cerebral [facility])      Primary Rad/Onc:  Dr. Vikki Land    Site Delivered Dose (cGy) Nick Gusman

## 2021-01-06 PROCEDURE — 77386 HC IMRT COMPLEX: CPT | Performed by: RADIOLOGY

## 2021-01-07 PROCEDURE — 77386 HC IMRT COMPLEX: CPT | Performed by: RADIOLOGY

## 2021-01-08 PROCEDURE — 77336 RADIATION PHYSICS CONSULT: CPT | Performed by: RADIOLOGY

## 2021-01-08 PROCEDURE — 77386 HC IMRT COMPLEX: CPT | Performed by: RADIOLOGY

## 2021-01-11 PROCEDURE — 77386 HC IMRT COMPLEX: CPT | Performed by: RADIOLOGY

## 2021-01-12 ENCOUNTER — OFFICE VISIT (OUTPATIENT)
Dept: RADIATION ONCOLOGY | Facility: HOSPITAL | Age: 86
End: 2021-01-12
Attending: RADIOLOGY
Payer: MEDICARE

## 2021-01-12 VITALS
HEART RATE: 87 BPM | BODY MASS INDEX: 26.34 KG/M2 | RESPIRATION RATE: 18 BRPM | TEMPERATURE: 98 F | DIASTOLIC BLOOD PRESSURE: 56 MMHG | HEIGHT: 63 IN | WEIGHT: 148.63 LBS | SYSTOLIC BLOOD PRESSURE: 113 MMHG

## 2021-01-12 DIAGNOSIS — D32.0 MENINGIOMA, CEREBRAL (HCC): Primary | ICD-10-CM

## 2021-01-12 PROCEDURE — 77386 HC IMRT COMPLEX: CPT | Performed by: RADIOLOGY

## 2021-01-12 NOTE — PROGRESS NOTES
[facility] Radiation Treatment Management Note 16-20    Patient:  Maria R Osuna  Age:  80year old  Visit Diagnosis:    1.  Meningioma, cerebral [facility])      Primary Rad/Onc:  Dr. Haji Cost    Site Delivered Dose (cGy) Marissa Gaines

## 2021-01-13 PROCEDURE — 77386 HC IMRT COMPLEX: CPT | Performed by: RADIOLOGY

## 2021-01-14 PROCEDURE — 77386 HC IMRT COMPLEX: CPT | Performed by: RADIOLOGY

## 2021-01-15 PROCEDURE — 77386 HC IMRT COMPLEX: CPT | Performed by: RADIOLOGY

## 2021-01-15 PROCEDURE — 77336 RADIATION PHYSICS CONSULT: CPT | Performed by: RADIOLOGY

## 2021-01-18 PROCEDURE — 77386 HC IMRT COMPLEX: CPT | Performed by: RADIOLOGY

## 2021-01-19 ENCOUNTER — OFFICE VISIT (OUTPATIENT)
Dept: RADIATION ONCOLOGY | Facility: HOSPITAL | Age: 86
End: 2021-01-19
Attending: RADIOLOGY
Payer: MEDICARE

## 2021-01-19 VITALS
WEIGHT: 149.19 LBS | DIASTOLIC BLOOD PRESSURE: 71 MMHG | HEIGHT: 63 IN | TEMPERATURE: 98 F | RESPIRATION RATE: 18 BRPM | BODY MASS INDEX: 26.43 KG/M2 | HEART RATE: 89 BPM | SYSTOLIC BLOOD PRESSURE: 139 MMHG

## 2021-01-19 DIAGNOSIS — D32.0 MENINGIOMA, CEREBRAL (HCC): Primary | ICD-10-CM

## 2021-01-19 PROCEDURE — 77386 HC IMRT COMPLEX: CPT | Performed by: RADIOLOGY

## 2021-01-19 NOTE — PROGRESS NOTES
[facility] Radiation Treatment Management Note 21-25    Patient:  Eliud Patiño  Age:  80year old  Visit Diagnosis:    1.  Meningioma, cerebral [facility])      Primary Rad/Onc:  Dr. Debra Pereyra    Site Delivered Dose (cGy) Ga Nuno

## 2021-01-20 PROCEDURE — 77386 HC IMRT COMPLEX: CPT | Performed by: RADIOLOGY

## 2021-01-21 PROCEDURE — 77386 HC IMRT COMPLEX: CPT | Performed by: RADIOLOGY

## 2021-01-22 PROCEDURE — 77336 RADIATION PHYSICS CONSULT: CPT | Performed by: RADIOLOGY

## 2021-01-22 PROCEDURE — 77386 HC IMRT COMPLEX: CPT | Performed by: RADIOLOGY

## 2021-01-22 NOTE — TELEPHONE ENCOUNTER
Patient called back stating  her candida in her mouth is not improving with clotrimazole. Per patient , she has two more sessions of radiation left then she will be done. Per patient, all food still taste horrible.    Patient states she spoke to the [facility]

## 2021-01-25 PROCEDURE — 77386 HC IMRT COMPLEX: CPT | Performed by: RADIOLOGY

## 2021-01-26 ENCOUNTER — OFFICE VISIT (OUTPATIENT)
Dept: RADIATION ONCOLOGY | Facility: HOSPITAL | Age: 86
End: 2021-01-26
Attending: RADIOLOGY
Payer: MEDICARE

## 2021-01-26 VITALS
DIASTOLIC BLOOD PRESSURE: 72 MMHG | RESPIRATION RATE: 18 BRPM | TEMPERATURE: 97 F | OXYGEN SATURATION: 97 % | BODY MASS INDEX: 26 KG/M2 | SYSTOLIC BLOOD PRESSURE: 146 MMHG | WEIGHT: 146.81 LBS | HEART RATE: 101 BPM

## 2021-01-26 DIAGNOSIS — D32.0 MENINGIOMA, CEREBRAL (HCC): Primary | ICD-10-CM

## 2021-01-26 PROCEDURE — 77336 RADIATION PHYSICS CONSULT: CPT | Performed by: RADIOLOGY

## 2021-01-26 PROCEDURE — 77386 HC IMRT COMPLEX: CPT | Performed by: RADIOLOGY

## 2021-01-26 NOTE — PROGRESS NOTES
[facility] Radiation Treatment Management Note 26-30    Patient:  Eliud Patiño  Age:  80year old  Visit Diagnosis:    1.  Meningioma, cerebral [facility])      Primary Rad/Onc:  Dr. Debra Pereyra    Site Delivered Dose (cGy) Ga Nuno

## 2021-01-26 NOTE — PATIENT INSTRUCTIONS
Follow up with Dr. Vielka Larios in 4 weeks. Call [phone] to schedule a follow up appointment. At this appointment Dr. Vielka Larios will talk to you about follow up MRI's.

## 2021-01-28 ENCOUNTER — TELEPHONE (OUTPATIENT)
Dept: INTERNAL MEDICINE CLINIC | Facility: CLINIC | Age: 86
End: 2021-01-28

## 2021-01-28 NOTE — TELEPHONE ENCOUNTER
Patient reports has been awaiting follow up for symptoms of thrush to tongue, reports for 3 weeks has been with thrush, tongue is cracked and painful, painful to eat and drink liquids, has been eating less because of the pain.  Reports was prescribed anothe

## 2021-01-28 NOTE — TELEPHONE ENCOUNTER
Left message for the patient that I will send prescription for nystatin to the pharmacy, and I can see her this coming Saturday here for openings she should let us know if she would like to be seen on Saturday

## 2021-01-30 ENCOUNTER — OFFICE VISIT (OUTPATIENT)
Dept: INTERNAL MEDICINE CLINIC | Facility: CLINIC | Age: 86
End: 2021-01-30
Payer: MEDICARE

## 2021-01-30 ENCOUNTER — LAB ENCOUNTER (OUTPATIENT)
Dept: LAB | Age: 86
End: 2021-01-30
Attending: INTERNAL MEDICINE
Payer: MEDICARE

## 2021-01-30 VITALS
BODY MASS INDEX: 25.87 KG/M2 | DIASTOLIC BLOOD PRESSURE: 79 MMHG | HEART RATE: 79 BPM | SYSTOLIC BLOOD PRESSURE: 127 MMHG | WEIGHT: 146 LBS | HEIGHT: 63 IN

## 2021-01-30 DIAGNOSIS — R43.2 TASTE PERVERSION: Primary | ICD-10-CM

## 2021-01-30 DIAGNOSIS — R43.2 TASTE PERVERSION: ICD-10-CM

## 2021-01-30 LAB
ALBUMIN SERPL-MCNC: 4 G/DL (ref 3.4–5)
ALBUMIN/GLOB SERPL: 1.2 {RATIO} (ref 1–2)
ALP LIVER SERPL-CCNC: 53 U/L
ALT SERPL-CCNC: 14 U/L
ANION GAP SERPL CALC-SCNC: 4 MMOL/L (ref 0–18)
AST SERPL-CCNC: 12 U/L (ref 15–37)
BASOPHILS # BLD AUTO: 0.04 X10(3) UL (ref 0–0.2)
BASOPHILS NFR BLD AUTO: 0.7 %
BILIRUB SERPL-MCNC: 0.6 MG/DL (ref 0.1–2)
BUN BLD-MCNC: 12 MG/DL (ref 7–18)
BUN/CREAT SERPL: 14.6 (ref 10–20)
CALCIUM BLD-MCNC: 9.9 MG/DL (ref 8.5–10.1)
CHLORIDE SERPL-SCNC: 110 MMOL/L (ref 98–112)
CO2 SERPL-SCNC: 28 MMOL/L (ref 21–32)
CREAT BLD-MCNC: 0.82 MG/DL
DEPRECATED RDW RBC AUTO: 41.3 FL (ref 35.1–46.3)
EOSINOPHIL # BLD AUTO: 0.09 X10(3) UL (ref 0–0.7)
EOSINOPHIL NFR BLD AUTO: 1.7 %
ERYTHROCYTE [DISTWIDTH] IN BLOOD BY AUTOMATED COUNT: 12.5 % (ref 11–15)
GLOBULIN PLAS-MCNC: 3.4 G/DL (ref 2.8–4.4)
GLUCOSE BLD-MCNC: 102 MG/DL (ref 70–99)
HCT VFR BLD AUTO: 43 %
HGB BLD-MCNC: 14 G/DL
IMM GRANULOCYTES # BLD AUTO: 0.01 X10(3) UL (ref 0–1)
IMM GRANULOCYTES NFR BLD: 0.2 %
LYMPHOCYTES # BLD AUTO: 1.65 X10(3) UL (ref 1–4)
LYMPHOCYTES NFR BLD AUTO: 30.3 %
M PROTEIN MFR SERPL ELPH: 7.4 G/DL (ref 6.4–8.2)
MCH RBC QN AUTO: 29.2 PG (ref 26–34)
MCHC RBC AUTO-ENTMCNC: 32.6 G/DL (ref 31–37)
MCV RBC AUTO: 89.8 FL
MONOCYTES # BLD AUTO: 0.47 X10(3) UL (ref 0.1–1)
MONOCYTES NFR BLD AUTO: 8.6 %
NEUTROPHILS # BLD AUTO: 3.19 X10 (3) UL (ref 1.5–7.7)
NEUTROPHILS # BLD AUTO: 3.19 X10(3) UL (ref 1.5–7.7)
NEUTROPHILS NFR BLD AUTO: 58.5 %
OSMOLALITY SERPL CALC.SUM OF ELEC: 294 MOSM/KG (ref 275–295)
PATIENT FASTING Y/N/NP: NO
PLATELET # BLD AUTO: 324 10(3)UL (ref 150–450)
POTASSIUM SERPL-SCNC: 4.2 MMOL/L (ref 3.5–5.1)
RBC # BLD AUTO: 4.79 X10(6)UL
SODIUM SERPL-SCNC: 142 MMOL/L (ref 136–145)
WBC # BLD AUTO: 5.5 X10(3) UL (ref 4–11)

## 2021-01-30 PROCEDURE — 36415 COLL VENOUS BLD VENIPUNCTURE: CPT

## 2021-01-30 PROCEDURE — 99213 OFFICE O/P EST LOW 20 MIN: CPT | Performed by: INTERNAL MEDICINE

## 2021-01-30 PROCEDURE — 80053 COMPREHEN METABOLIC PANEL: CPT

## 2021-01-30 PROCEDURE — 85025 COMPLETE CBC W/AUTO DIFF WBC: CPT

## 2021-01-30 NOTE — PROGRESS NOTES
[facility]    PATIENT'S NAME: Allan Adrianapamela   RADIATION ONCOLOGIST: Ca Ng.  Urmila Hernandez MD   PATIENT ACCOUNT #: [de-identified] LOCATION: [address] [place] [address] RECORD #: [id number] YOB: 1934   DATE: 01/26/2021       RADIATION ONC treatments well and never experienced much in the way of significant side effects or difficulties as a result of radiation.   She felt that her vision had been compromised to some degree prior to radiation, particularly with respect to her peripheral vision

## 2021-01-31 PROBLEM — K14.6 PAINFUL TONGUE: Status: ACTIVE | Noted: 2021-01-31

## 2021-02-01 NOTE — PROGRESS NOTES
HPI:    Patient ID: Rubén Fischer is a 80year old female.   Presents presents for evaluation of painful tongue general follow-up  HPI  Patient completed radiation treatment for the meningioma, last 4 weeks bothered by painful cracked tongue, tried Myc Cardiovascular: Normal rate, regular rhythm and normal heart sounds. No murmur heard. Edema not present. Pulmonary/Chest: Effort normal. No respiratory distress. She has no wheezes. Abdominal: Soft.    Neurological: She is alert and oriented to pe

## 2021-02-08 ENCOUNTER — TELEPHONE (OUTPATIENT)
Dept: INTERNAL MEDICINE CLINIC | Facility: CLINIC | Age: 86
End: 2021-02-08

## 2021-02-08 NOTE — TELEPHONE ENCOUNTER
Ayala Gonzalez MD   2/3/2021  7:12 PM      Is call patient blood test showed normal sugar liver kidney function test and normal blood count no anemia or sign of infection. Patient informed of blood test results.

## 2021-02-08 NOTE — TELEPHONE ENCOUNTER
LOV 1-    Diagnosed with Thrush. Patient continues to have a coating on her tongue. Continues to use Nystatin throat suspension, swish and swallowing. Has lost 17 pounds in the last few weeks. Having difficulty eating.  States he tongue feels lik

## 2021-02-08 NOTE — TELEPHONE ENCOUNTER
I called the patient and informed her of Dr. Aguirre Goods recommendation below. Provided phone number for scheduling.

## 2021-02-08 NOTE — TELEPHONE ENCOUNTER
PLEASE CALL PT ,  PLEASE  ADVISED HER TO SEE  ENT DR Oconnor/ Jaylene/ Deborah  FOR  FURTHER  EVALuation [phone]

## 2021-02-11 ENCOUNTER — OFFICE VISIT (OUTPATIENT)
Dept: OTOLARYNGOLOGY | Facility: CLINIC | Age: 86
End: 2021-02-11
Payer: MEDICARE

## 2021-02-11 VITALS
DIASTOLIC BLOOD PRESSURE: 70 MMHG | WEIGHT: 140 LBS | TEMPERATURE: 98 F | BODY MASS INDEX: 24.8 KG/M2 | HEIGHT: 63 IN | SYSTOLIC BLOOD PRESSURE: 114 MMHG

## 2021-02-11 DIAGNOSIS — R43.2 DYSGEUSIA: ICD-10-CM

## 2021-02-11 DIAGNOSIS — K14.6 PAINFUL TONGUE: Primary | ICD-10-CM

## 2021-02-11 PROCEDURE — 99203 OFFICE O/P NEW LOW 30 MIN: CPT | Performed by: OTOLARYNGOLOGY

## 2021-02-11 RX ORDER — DIPHENHYDRAMINE HCL 12.5MG/5ML
LIQUID (ML) ORAL
Qty: 500 ML | Refills: 0 | Status: SHIPPED | OUTPATIENT
Start: 2021-02-11

## 2021-02-11 NOTE — PROGRESS NOTES
Dalton Arrow is a 80year old female.   Patient presents with:  Mouth Cold Sores: Patient Presents with: Irritated Tongue for over 1 month, whitness on tongue,  per pt just completed radiation for Tumor       HISTORY OF PRESENT ILLNESS  She presents w Constitutional Negative Fatigue, fever and weight loss. ENMT Negative Drooling. Eyes Negative Blurred vision and vision changes. Respiratory Negative Dyspnea and wheezing. Cardio Negative Chest pain, irregular heartbeat/palpitations and syncope. Normal Left: Normal. Septum -Normal  Turbinates - Right: Normal, Left: Normal.       Current Outpatient Medications:   •  nystatin [id number] UNIT/ML Mouth/Throat Suspension, Take 5 mL (500,000 Units total) by mouth 4 (four) times daily.  Swish in the mouth, Luiz Ortiz

## 2021-02-26 ENCOUNTER — OFFICE VISIT (OUTPATIENT)
Dept: OTOLARYNGOLOGY | Facility: CLINIC | Age: 86
End: 2021-02-26
Payer: MEDICARE

## 2021-02-26 ENCOUNTER — OFFICE VISIT (OUTPATIENT)
Dept: RADIATION ONCOLOGY | Facility: HOSPITAL | Age: 86
End: 2021-02-26
Attending: RADIOLOGY
Payer: MEDICARE

## 2021-02-26 VITALS
TEMPERATURE: 98 F | BODY MASS INDEX: 24.98 KG/M2 | HEIGHT: 63 IN | SYSTOLIC BLOOD PRESSURE: 143 MMHG | HEART RATE: 81 BPM | WEIGHT: 141 LBS | RESPIRATION RATE: 18 BRPM | DIASTOLIC BLOOD PRESSURE: 82 MMHG

## 2021-02-26 VITALS
SYSTOLIC BLOOD PRESSURE: 133 MMHG | TEMPERATURE: 98 F | WEIGHT: 141 LBS | BODY MASS INDEX: 24.98 KG/M2 | HEIGHT: 63 IN | DIASTOLIC BLOOD PRESSURE: 74 MMHG

## 2021-02-26 DIAGNOSIS — R43.2 DYSGEUSIA: Primary | ICD-10-CM

## 2021-02-26 DIAGNOSIS — D32.0 MENINGIOMA, CEREBRAL (HCC): Primary | ICD-10-CM

## 2021-02-26 DIAGNOSIS — K14.6 PAINFUL TONGUE: ICD-10-CM

## 2021-02-26 PROCEDURE — 99213 OFFICE O/P EST LOW 20 MIN: CPT | Performed by: OTOLARYNGOLOGY

## 2021-02-26 PROCEDURE — 99211 OFF/OP EST MAY X REQ PHY/QHP: CPT

## 2021-02-26 RX ORDER — FLUOROMETHOLONE 0.1 %
1 SUSPENSION, DROPS(FINAL DOSAGE FORM)(ML) OPHTHALMIC (EYE) 2 TIMES DAILY
COMMUNITY
Start: 2021-02-11

## 2021-02-26 NOTE — PATIENT INSTRUCTIONS
Call & schedule MRI in May and then call for appointment to see Dr Zack Sullivan, once MRI is scheduled. Call [phone] to make appointment with Dr Zack [facility]. Call nursing # for any questions or concerns @ [phone].

## 2021-02-26 NOTE — PROGRESS NOTES
Pt seen in 1 month follow up with Dr Jay Jay Keith, having completed radiation to brain meningioma 1/26/21. Accompanied by her son in law. States her balance has improved 25% since completion of radiation. Wt loss noted.  Pt states she still cannot taste, Gwendlyn Ahr

## 2021-02-26 NOTE — PROGRESS NOTES
Artem Pyle is a 80year old female. Patient presents with:   Follow - Up: 2 week follow up- dysguesia- per pt no changes/improvement of symptoms      HISTORY OF PRESENT ILLNESS  She presents with a 1 month history of a white surface on her tongue t Diabetes Mother        Past Medical History:   Diagnosis Date   • Hyperlipidemia    • Meningioma [facility])    • Skin cancer     left hand       Past Surgical History:   Procedure Laterality Date   • APPENDECTOMY     • CYST ASPIRATION LEFT      breast   • HYSTE Lymph Detail Normal Submental. Submandibular. Anterior cervical. Posterior cervical. Supraclavicular.    Tongue   irritated   Nose/Mouth/Throat Normal External nose - Normal. Lips/teeth/gums - Normal. Tonsils - Normal. Oropharynx - Normal.   Nose/Mouth/Th have asked her to continue avoiding brushing her tongue. This note was prepared using [employer] voice recognition dictation software. As a result errors may occur. When identified these errors have been corrected.  While every attempt is made to

## 2021-02-27 NOTE — PROGRESS NOTES
[facility]    PATIENT'S NAME: Maximo Grace   RADIATION ONCOLOGIST: Sarkis Tariq.  Reyes May MD   PATIENT ACCOUNT #: [de-identified] LOCATION: [address] [place] [address] RECORD #: [id number] YOB: 1934   FOLLOW-UP DATE: 02/26/2021       RAD issues in her mouth with some coating in her tongue and some altered sense of taste. This predates her radiation and is of unknown etiology.   She has been seeing Dr. Cherylene Scotts who prescribed her a hydrocortisone and Benadryl mix, but this has apparently not 3 months and will see her back thereafter to go over the results. With respect to her own most pressing concern, that of her tongue issues and sense of taste, she will be seeing Dr. Butler [place] later today.   I am hopeful that he will be able to unearth the e

## 2021-03-06 NOTE — PROGRESS NOTES
HPI:    Patient ID: Leopoldo Southerly is a 80year old female.   Presents for follow-up on taste perversion,    HPI  Patient reports that she continues to have absence of taste, tongue feels rough and sensitive, no pain or burning sensation, she has seen E HALLUCINATION  Lyrica [Pregabalin]     RASH  Norco [Hydrocodone-*    RASH   /80   Pulse 84   Temp 98.2 °F (36.8 °C) (Tympanic)   Resp 18   Ht 5' 3\" (1.6 m)   Wt 140 lb 9.6 oz (63.8 kg)   BMI 24.91 kg/m²    Physical Exam    Constitutional: She is obinna

## 2021-03-12 DIAGNOSIS — Z23 NEED FOR VACCINATION: ICD-10-CM

## 2021-03-26 ENCOUNTER — TELEPHONE (OUTPATIENT)
Dept: INTERNAL MEDICINE CLINIC | Facility: CLINIC | Age: 86
End: 2021-03-26

## 2021-03-26 NOTE — TELEPHONE ENCOUNTER
Patricia from [facility] stated pt informed her Nystatin 500,000 Units Oral 4 times daily, Swish in the mouth  Isn't working. Patricia is recommending Floconazole 200mg every other day for 3 doses. Please advise. Thank you.

## 2021-03-27 ENCOUNTER — TELEPHONE (OUTPATIENT)
Dept: INTERNAL MEDICINE CLINIC | Facility: CLINIC | Age: 86
End: 2021-03-27

## 2021-03-27 NOTE — TELEPHONE ENCOUNTER
Spoke to patient, states the tip of the tongue still burning, she has noticed in the mouth, topical treatments did not help, she is willing to try oral medications, will give her fluconazole 100 mg daily for 7 days advised if it does not help no more treatment needed she has burning tongue syndrome

## 2021-03-27 NOTE — TELEPHONE ENCOUNTER
Patient returning call. Patient would like Dr. Cally Quevedo to call her back on phone number listed in contact information.      Dr Cally Quevedo:   Please call patient

## 2021-04-07 ENCOUNTER — LAB ENCOUNTER (OUTPATIENT)
Dept: LAB | Age: 86
End: 2021-04-07
Attending: INTERNAL MEDICINE
Payer: MEDICARE

## 2021-04-07 DIAGNOSIS — I10 ESSENTIAL HYPERTENSION: ICD-10-CM

## 2021-04-07 PROCEDURE — 80053 COMPREHEN METABOLIC PANEL: CPT

## 2021-04-07 PROCEDURE — 85025 COMPLETE CBC W/AUTO DIFF WBC: CPT

## 2021-04-07 PROCEDURE — 36415 COLL VENOUS BLD VENIPUNCTURE: CPT

## 2021-04-09 ENCOUNTER — OFFICE VISIT (OUTPATIENT)
Dept: INTERNAL MEDICINE CLINIC | Facility: CLINIC | Age: 86
End: 2021-04-09
Payer: MEDICARE

## 2021-04-09 ENCOUNTER — LAB ENCOUNTER (OUTPATIENT)
Dept: LAB | Age: 86
End: 2021-04-09
Attending: INTERNAL MEDICINE
Payer: MEDICARE

## 2021-04-09 VITALS
DIASTOLIC BLOOD PRESSURE: 70 MMHG | WEIGHT: 139.63 LBS | HEART RATE: 89 BPM | HEIGHT: 63 IN | TEMPERATURE: 97 F | BODY MASS INDEX: 24.74 KG/M2 | SYSTOLIC BLOOD PRESSURE: 133 MMHG

## 2021-04-09 DIAGNOSIS — R63.0 ANOREXIA SYMPTOM: ICD-10-CM

## 2021-04-09 DIAGNOSIS — R26.89 BALANCE DISORDER: ICD-10-CM

## 2021-04-09 DIAGNOSIS — F32.9 REACTIVE DEPRESSION: ICD-10-CM

## 2021-04-09 DIAGNOSIS — E55.9 VITAMIN D DEFICIENCY: ICD-10-CM

## 2021-04-09 DIAGNOSIS — E60 ZINC DEFICIENCY DISEASE: ICD-10-CM

## 2021-04-09 DIAGNOSIS — R43.2 TASTE PERVERSION: ICD-10-CM

## 2021-04-09 DIAGNOSIS — R43.2 TASTE PERVERSION: Primary | ICD-10-CM

## 2021-04-09 PROCEDURE — 36415 COLL VENOUS BLD VENIPUNCTURE: CPT

## 2021-04-09 PROCEDURE — 99213 OFFICE O/P EST LOW 20 MIN: CPT | Performed by: INTERNAL MEDICINE

## 2021-04-09 PROCEDURE — 82607 VITAMIN B-12: CPT

## 2021-04-09 PROCEDURE — 82306 VITAMIN D 25 HYDROXY: CPT

## 2021-04-09 PROCEDURE — 84425 ASSAY OF VITAMIN B-1: CPT

## 2021-04-09 PROCEDURE — 82746 ASSAY OF FOLIC ACID SERUM: CPT

## 2021-04-11 NOTE — PROGRESS NOTES
HPI/Subjective:   Patient ID: Ivan Khan is a 80year old female. Presents for follow-up of multiple medical conditions    HPI  Patient reports that she continues to have taste perversion, lost appetite altogether, makes herself eat.   Continues to awake alert oriented x3 looks somewhat anxious, does not look in severe distress.   Heart S1-S2 present, lungs are clear to auscultation, tongue looks normal  Assessment & Plan:   Taste perversion  (primary encounter diagnosis) etiology not clear we will ch

## 2021-04-20 RX ORDER — LORAZEPAM 0.5 MG/1
0.5 TABLET ORAL NIGHTLY
Qty: 30 TABLET | Refills: 3 | Status: SHIPPED | OUTPATIENT
Start: 2021-04-20 | End: 2021-08-21

## 2021-05-10 ENCOUNTER — HOSPITAL ENCOUNTER (OUTPATIENT)
Dept: GENERAL RADIOLOGY | Age: 86
Discharge: HOME | End: 2021-05-10
Attending: ANESTHESIOLOGY
Payer: MEDICARE

## 2021-05-10 ENCOUNTER — TELEPHONE (OUTPATIENT)
Dept: INTERNAL MEDICINE CLINIC | Facility: CLINIC | Age: 86
End: 2021-05-10

## 2021-05-10 ENCOUNTER — HOSPITAL ENCOUNTER (OUTPATIENT)
Dept: MRI IMAGING | Age: 86
Discharge: HOME | End: 2021-05-10
Attending: ANESTHESIOLOGY
Payer: MEDICARE

## 2021-05-10 DIAGNOSIS — N64.4 BREAST PAIN: ICD-10-CM

## 2021-05-10 PROCEDURE — 72072 X-RAY EXAM THORAC SPINE 3VWS: CPT | Performed by: ANESTHESIOLOGY

## 2021-05-10 PROCEDURE — 72146 MRI CHEST SPINE W/O DYE: CPT | Performed by: ANESTHESIOLOGY

## 2021-05-10 PROCEDURE — 71100 X-RAY EXAM RIBS UNI 2 VIEWS: CPT | Performed by: ANESTHESIOLOGY

## 2021-05-10 NOTE — TELEPHONE ENCOUNTER
Pt stopped by [place] office wanted to be seen by Dr. Arcelia Grover informed Dr is out. Pt stts she has bitter taste in mouth, per pt Dr is aware of her situation. Pt stts she has received radiation. Pt was offered appt with other providers in the office, pt declined wants to see Dr Azalea Mantilla. Wants Dr to call her. Please advise.

## 2021-05-10 NOTE — TELEPHONE ENCOUNTER
Spoke with pt,  verified, pt stated she saw Dr Maribeth Solis about this problem before and she was given meds ( not sure of name) that it didn't helped. Pt is req a different med that she can chew on to make bitter taste go away. Pt stated she can't eat anything, pt also had 28 radiation. Pls send [facility]. Routed to Dr Miryam Nicolas on call for Dr Maribeth Solis for advise, thanks. Pls advise, thanks in advance. Pt lov 21.   Assessment & Plan:      Taste perversion  (primary encounter diagnosis) etiology not clear we will check for vitamin deficiency  Vitamin D deficiency  Balance disorder will check vitamin B12 level  Anorexia symptom discussed with patient what foods to eat to improve appetite, we may consider Megace  Reactive depression advised patient to continue Lexapro  Zinc deficiency disease  Chest wall pain see pain specialist for evaluation referred patient to Dr. Maribeth Solis was  Orders Placed This Encounter      Vitamin D, 25-Hydroxy [E]      Vitamin W78 [E]      Folic Acid Serum [E]      Zinc, Plasma Or Serum [E]      Vitamin B1 (Thiamine), Blood [E]

## 2021-05-10 NOTE — TELEPHONE ENCOUNTER
If nothing acute   I advise wait for DR Cutler's recommendation  Otherwise if acutely Ill go to the ER

## 2021-06-01 ENCOUNTER — HOSPITAL ENCOUNTER (OUTPATIENT)
Dept: MRI IMAGING | Age: 86
Discharge: HOME | End: 2021-06-01
Attending: RADIOLOGY
Payer: MEDICARE

## 2021-06-01 DIAGNOSIS — D32.0 MENINGIOMA, CEREBRAL (HCC): ICD-10-CM

## 2021-06-01 PROCEDURE — 70553 MRI BRAIN STEM W/O & W/DYE: CPT | Performed by: RADIOLOGY

## 2021-06-01 PROCEDURE — 82565 ASSAY OF CREATININE: CPT

## 2021-06-01 PROCEDURE — A9575 INJ GADOTERATE MEGLUMI 0.1ML: HCPCS | Performed by: RADIOLOGY

## 2021-06-08 ENCOUNTER — OFFICE VISIT (OUTPATIENT)
Dept: RADIATION ONCOLOGY | Facility: HOSPITAL | Age: 86
End: 2021-06-08
Attending: RADIOLOGY
Payer: MEDICARE

## 2021-06-08 VITALS
HEART RATE: 77 BPM | DIASTOLIC BLOOD PRESSURE: 88 MMHG | HEIGHT: 63 IN | SYSTOLIC BLOOD PRESSURE: 132 MMHG | TEMPERATURE: 98 F | RESPIRATION RATE: 16 BRPM | WEIGHT: 143.38 LBS | BODY MASS INDEX: 25.41 KG/M2

## 2021-06-08 DIAGNOSIS — D32.0 MENINGIOMA, CEREBRAL (HCC): Primary | ICD-10-CM

## 2021-06-08 PROCEDURE — 99211 OFF/OP EST MAY X REQ PHY/QHP: CPT

## 2021-06-08 NOTE — PATIENT INSTRUCTIONS
MRI in December, 2021 and see Dr Cyndee Bishop following MRI. Please call [phone] to make the appointment for follow up, once the MRI is scheduled.

## 2021-06-08 NOTE — PROGRESS NOTES
Pt seen in follow up with Dr Papa Fox having completed radiation to the brain 1/26/21. Pt continues to have gait imbalance. No falls. Very pleasant and conversant. In good spirits. Continues to live alone with family in surrounding areas. No headaches.  Leah Wang

## 2021-06-09 NOTE — PROGRESS NOTES
[facility]    PATIENT'S NAME: Ivan Thalia   RADIATION ONCOLOGIST: Radha Garrett.  Damon Kern MD   PATIENT ACCOUNT #: [de-identified] LOCATION: [address] [place] [address] RECORD #: [id number] YOB: 1934   FOLLOW-UP DATE: 06/08/2021       RAD history of numerous similar issues and has been worked up extensively for her dysgeusia with no particular etiology and has been discharged by ENT.   The patient's family have relayed at times that numerous complaints are never found to have any particular feel free to contact my office and I would be happy to see her sooner. Thank you very much for allowing me the opportunity to participate in the care of this patient.   If there should be any questions regarding the radiotherapy, please feel free to cont

## 2021-07-21 RX ORDER — ESCITALOPRAM OXALATE 10 MG/1
TABLET ORAL
Qty: 90 TABLET | Refills: 0 | Status: SHIPPED | OUTPATIENT
Start: 2021-07-21 | End: 2021-12-14

## 2021-08-05 ENCOUNTER — OFFICE VISIT (OUTPATIENT)
Dept: DERMATOLOGY CLINIC | Facility: CLINIC | Age: 86
End: 2021-08-05
Payer: MEDICARE

## 2021-08-05 DIAGNOSIS — L57.0 AK (ACTINIC KERATOSIS): Primary | ICD-10-CM

## 2021-08-05 DIAGNOSIS — L82.1 SEBORRHEIC KERATOSES: ICD-10-CM

## 2021-08-05 DIAGNOSIS — D22.9 MULTIPLE NEVI: ICD-10-CM

## 2021-08-05 DIAGNOSIS — D23.9 BENIGN NEOPLASM OF SKIN, UNSPECIFIED LOCATION: ICD-10-CM

## 2021-08-05 PROCEDURE — 17003 DESTRUCT PREMALG LES 2-14: CPT | Performed by: DERMATOLOGY

## 2021-08-05 PROCEDURE — 99202 OFFICE O/P NEW SF 15 MIN: CPT | Performed by: DERMATOLOGY

## 2021-08-05 PROCEDURE — 17000 DESTRUCT PREMALG LESION: CPT | Performed by: DERMATOLOGY

## 2021-08-05 RX ORDER — AMITRIPTYLINE HYDROCHLORIDE 10 MG/1
TABLET, FILM COATED ORAL
COMMUNITY
Start: 2021-08-04 | End: 2021-12-14

## 2021-08-05 RX ORDER — DULOXETIN HYDROCHLORIDE 20 MG/1
CAPSULE, DELAYED RELEASE ORAL
COMMUNITY
Start: 2021-04-21 | End: 2021-09-27

## 2021-08-05 RX ORDER — TRAMADOL HYDROCHLORIDE 50 MG/1
50 TABLET ORAL
COMMUNITY
Start: 2021-07-28 | End: 2021-12-14

## 2021-08-15 NOTE — PROGRESS NOTES
Belinda Carter is a 80year old female. HPI:     CC:  Patient presents with:  Upper Body Exam: New pt.pt presenting today with upper body skin check. pt concerned lesion to nose that as been peeling for 3 months. Denies itching or pain.  No change in s taking: Reported on 8/5/2021 ) 500 mL 0   • clotrimazole 10 MG Mouth/Throat Mac Take 1 lozenge (10 mg total) by mouth 3 (three) times daily.  (Patient not taking: Reported on 8/5/2021 ) 30 Mac 0   • omeprazole 20 MG Oral Capsule Delayed Release Take 1 Not Asked        Outdoor occupation: Not Asked        Breast feeding: Not Asked        Reaction to local anesthetic: No    Social History Narrative      The patient does not use an assistive device. .        The patient does live in a home with stairs.     Tres Brady noted.       Physical Examination:     Well-developed well-nourished patient alert oriented in no acute distress.   Exam performed, including scalp, head, neck, face,nails, hair, external eyes, including conjunctival mucosa, eyelids, lips external ears , ar at length. Benign nevi, seborrheic  keratoses, cherry angiomas:  Reassurance regarding other benign skin lesions. Signs and symptoms of skin cancer, ABCDE's of melanoma discussed with patient. Sunscreen use, sun protection, self exams reviewed.   Followup

## 2021-08-21 RX ORDER — LORAZEPAM 0.5 MG/1
0.5 TABLET ORAL NIGHTLY
Qty: 30 TABLET | Refills: 0 | Status: SHIPPED | OUTPATIENT
Start: 2021-08-21 | End: 2021-09-21

## 2021-09-20 NOTE — TELEPHONE ENCOUNTER
Protocol failed or has No Protocol, please review  Requested Prescriptions   Pending Prescriptions Disp Refills    LORAZEPAM 0.5 MG Oral Tab [Pharmacy Med Name: Lorazepam 0.5 Mg Tab Auro] 30 tablet 0     Sig: Take 1 tablet (0.5 mg total) by mouth nightly.

## 2021-09-21 RX ORDER — LORAZEPAM 0.5 MG/1
0.5 TABLET ORAL NIGHTLY
Qty: 30 TABLET | Refills: 0 | Status: SHIPPED | OUTPATIENT
Start: 2021-09-21 | End: 2021-10-16

## 2021-09-27 ENCOUNTER — LAB ENCOUNTER (OUTPATIENT)
Dept: LAB | Age: 86
End: 2021-09-27
Attending: INTERNAL MEDICINE
Payer: MEDICARE

## 2021-09-27 ENCOUNTER — OFFICE VISIT (OUTPATIENT)
Dept: INTERNAL MEDICINE CLINIC | Facility: CLINIC | Age: 86
End: 2021-09-27
Payer: MEDICARE

## 2021-09-27 VITALS
HEART RATE: 98 BPM | RESPIRATION RATE: 17 BRPM | SYSTOLIC BLOOD PRESSURE: 131 MMHG | DIASTOLIC BLOOD PRESSURE: 70 MMHG | BODY MASS INDEX: 26.16 KG/M2 | WEIGHT: 147.63 LBS | HEIGHT: 63 IN

## 2021-09-27 DIAGNOSIS — R27.0 ATAXIA: ICD-10-CM

## 2021-09-27 DIAGNOSIS — R53.83 OTHER FATIGUE: Primary | ICD-10-CM

## 2021-09-27 DIAGNOSIS — R53.83 OTHER FATIGUE: ICD-10-CM

## 2021-09-27 DIAGNOSIS — F41.9 ANXIETY: ICD-10-CM

## 2021-09-27 PROCEDURE — 85025 COMPLETE CBC W/AUTO DIFF WBC: CPT

## 2021-09-27 PROCEDURE — 99213 OFFICE O/P EST LOW 20 MIN: CPT | Performed by: INTERNAL MEDICINE

## 2021-09-27 PROCEDURE — 80053 COMPREHEN METABOLIC PANEL: CPT

## 2021-09-27 PROCEDURE — 84443 ASSAY THYROID STIM HORMONE: CPT

## 2021-09-27 PROCEDURE — 36415 COLL VENOUS BLD VENIPUNCTURE: CPT

## 2021-09-28 NOTE — PROGRESS NOTES
Subjective:   Patient ID: Delma Barnhart is a 80year old female.   Presents for follow-up on anxiety    HPI  Patient reports that overall her condition is the same, frustrated that she cannot do much, feels fatigued, continues to be ataxic, can be swin mg total) by mouth every morning before breakfast. (Patient not taking: No sig reported) 90 capsule 0     Allergies:  Penicillins             HALLUCINATION  Lyrica [Pregabalin]     RASH  Norco [Hydrocodone-*    RASH  Bactrim Ds              RASH   /7 year  Meds This Visit:  Requested Prescriptions      No prescriptions requested or ordered in this encounter       Imaging & Referrals:  None

## 2021-10-16 RX ORDER — LORAZEPAM 0.5 MG/1
0.5 TABLET ORAL NIGHTLY
Qty: 30 TABLET | Refills: 3 | Status: SHIPPED | OUTPATIENT
Start: 2021-10-16 | End: 2022-02-08

## 2021-10-16 NOTE — TELEPHONE ENCOUNTER
Please review refill protocol failed/ no protocol  Requested Prescriptions   Pending Prescriptions Disp Refills    LORAZEPAM 0.5 MG Oral Tab [Pharmacy Med Name: Lorazepam 0.5 Mg Tab Auro] 30 tablet 0     Sig: Take 1 tablet (0.5 mg total) by mouth nightly.         There is no refill protocol information for this order

## 2021-11-04 ENCOUNTER — OFFICE VISIT (OUTPATIENT)
Dept: DERMATOLOGY CLINIC | Facility: CLINIC | Age: 86
End: 2021-11-04
Payer: MEDICARE

## 2021-11-04 DIAGNOSIS — L82.1 SEBORRHEIC KERATOSES: ICD-10-CM

## 2021-11-04 DIAGNOSIS — D23.9 BENIGN NEOPLASM OF SKIN, UNSPECIFIED LOCATION: ICD-10-CM

## 2021-11-04 DIAGNOSIS — D22.9 MULTIPLE NEVI: ICD-10-CM

## 2021-11-04 DIAGNOSIS — L57.0 AK (ACTINIC KERATOSIS): Primary | ICD-10-CM

## 2021-11-04 PROCEDURE — 17003 DESTRUCT PREMALG LES 2-14: CPT | Performed by: DERMATOLOGY

## 2021-11-04 PROCEDURE — 99213 OFFICE O/P EST LOW 20 MIN: CPT | Performed by: DERMATOLOGY

## 2021-11-04 PROCEDURE — 17000 DESTRUCT PREMALG LESION: CPT | Performed by: DERMATOLOGY

## 2021-11-15 NOTE — PROGRESS NOTES
Carlos A White is a 80year old female. HPI:     CC:  Patient presents with:  Lesion: LOV 8/5/21. Patient present for lesion check to areas on neck and face. Cryo done at [address] [place] [address] and patient thinks more cryo is needed.  Personal hx of skin ca to left hand (three) times daily.  (Patient not taking: Reported on 11/4/2021) 30 Mac 0   • omeprazole 20 MG Oral Capsule Delayed Release Take 1 capsule (20 mg total) by mouth every morning before breakfast. (Patient not taking: No sig reported) 90 capsule 0     Hansel local anesthetic: No    Social History Narrative      The patient does not use an assistive device. .        The patient does live in a home with stairs.     Social Determinants of Health  Financial Resource Strain: Not on file  Food Insecurity: Not on file in this encounter.       Meds & Refills for this Visit:  Requested Prescriptions      No prescriptions requested or ordered in this encounter         Ak (actinic keratosis)  (primary encounter diagnosis)  Multiple nevi  Seborrheic keratoses  Benign neoplasm RTC ---routine checkup    6 mos -one year or p.r.n.     Encounter Times  Including precharting, reviewing chart, prior notes obtaining history: 5 minutes, medical exam :10 minutes, notes on body map, plan, counseling 10minutes My total time spent caring for

## 2021-11-26 ENCOUNTER — TELEPHONE (OUTPATIENT)
Dept: HEMATOLOGY/ONCOLOGY | Facility: HOSPITAL | Age: 86
End: 2021-11-26

## 2021-11-26 NOTE — TELEPHONE ENCOUNTER
Patient called to make an appointment with Dr. Kem Navarro after her MRI on 12/16/21. Please call to schedule. Thank you.

## 2021-12-16 ENCOUNTER — HOSPITAL ENCOUNTER (OUTPATIENT)
Dept: MRI IMAGING | Age: 86
Discharge: HOME | End: 2021-12-16
Attending: RADIOLOGY
Payer: MEDICARE

## 2021-12-16 DIAGNOSIS — D32.0 MENINGIOMA, CEREBRAL (HCC): ICD-10-CM

## 2021-12-16 PROCEDURE — 70553 MRI BRAIN STEM W/O & W/DYE: CPT | Performed by: RADIOLOGY

## 2021-12-16 PROCEDURE — A9575 INJ GADOTERATE MEGLUMI 0.1ML: HCPCS | Performed by: RADIOLOGY

## 2021-12-16 PROCEDURE — 82565 ASSAY OF CREATININE: CPT

## 2021-12-17 ENCOUNTER — OFFICE VISIT (OUTPATIENT)
Dept: RADIATION ONCOLOGY | Facility: HOSPITAL | Age: 86
End: 2021-12-17
Attending: RADIOLOGY
Payer: MEDICARE

## 2021-12-17 VITALS
BODY MASS INDEX: 26 KG/M2 | WEIGHT: 146.38 LBS | TEMPERATURE: 98 F | OXYGEN SATURATION: 96 % | HEART RATE: 85 BPM | DIASTOLIC BLOOD PRESSURE: 66 MMHG | RESPIRATION RATE: 16 BRPM | SYSTOLIC BLOOD PRESSURE: 139 MMHG

## 2021-12-17 DIAGNOSIS — D32.0 MENINGIOMA, CEREBRAL (HCC): Primary | ICD-10-CM

## 2021-12-17 PROCEDURE — 99211 OFF/OP EST MAY X REQ PHY/QHP: CPT

## 2021-12-17 RX ORDER — AMITRIPTYLINE HYDROCHLORIDE 10 MG/1
TABLET, FILM COATED ORAL NIGHTLY
COMMUNITY

## 2021-12-17 RX ORDER — ESCITALOPRAM OXALATE 10 MG/1
10 TABLET ORAL DAILY
COMMUNITY
End: 2021-12-19

## 2021-12-17 NOTE — PROGRESS NOTES
Nursing Follow-Up Note    Patient: Anastasiia Gilliam  YOB: 1934  Age: 80year old  Radiation Oncologist: Dr. Rashida Emmanuel  Referring Physician: Elsi Davila  Chief Complaint: Patient presents with:  Radiation    Date: 12/17/2021    [employer]

## 2021-12-17 NOTE — PATIENT INSTRUCTIONS
Follow up with Dr. Reyes May in 1 year. Jason Samson will call you to schedule your follow up appointment. Please call [phone] with any radiation questions. Schedule your MRI prior to your follow up. Call [phone] to schedule.

## 2021-12-18 NOTE — PROGRESS NOTES
[facility]    PATIENT'S NAME: Anna Sosa   RADIATION ONCOLOGIST: Marissa Azar.  Usman Guevara MD   PATIENT ACCOUNT #: [de-identified] LOCATION: [address] [place] [address] RECORD #: [id number] YOB: 1934   FOLLOW-UP DATE: 12/17/2021       RAD visual changes. She denies any other neurologic difficulties. Her most recent MRI was on 12/16/2021. This showed an interval decrease in the size of the meningioma.   It had been 2.9 x 2.7 x 2.2 cm on 06/01/2021, and is now down to 2.3 x 2.3 x 1.8 on y MD  d: 12/17/2021 13:35:33  t: 12/18/2021 11:43:33  [facility] [id number]  NAD/    cc: Mandy De Leon MD

## 2021-12-19 ENCOUNTER — TELEPHONE (OUTPATIENT)
Dept: INTERNAL MEDICINE CLINIC | Facility: CLINIC | Age: 86
End: 2021-12-19

## 2021-12-19 NOTE — PROGRESS NOTES
Subjective:   Patient ID: Leopoldo Southerly is a 80year old female.   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

## 2022-01-20 ENCOUNTER — TELEPHONE (OUTPATIENT)
Dept: HEMATOLOGY/ONCOLOGY | Facility: HOSPITAL | Age: 87
End: 2022-01-20

## 2022-01-20 NOTE — TELEPHONE ENCOUNTER
MSW intern placed a follow up call to discuss pt's recent distress screening. Pt did not answer. Voicemail was left for callback. MSW intern will callback at another time to speak with pt.      Evie Pelaez, MSW Intern  [employer]

## 2022-01-21 ENCOUNTER — TELEPHONE (OUTPATIENT)
Dept: HEMATOLOGY/ONCOLOGY | Facility: HOSPITAL | Age: 87
End: 2022-01-21

## 2022-01-21 RX ORDER — ESCITALOPRAM OXALATE 10 MG/1
TABLET ORAL
Qty: 90 TABLET | Refills: 0 | OUTPATIENT
Start: 2022-01-21

## 2022-01-21 NOTE — TELEPHONE ENCOUNTER
MSW intern placed follow up call regarding pt's recent PHQ score. Pt did not answer. MSW intern left a voicemail, and briefly explained support that may be available. MSW intern left phone number for callback.      Quinten Pierre, MSW Intern  [facility] Tan

## 2022-02-08 NOTE — TELEPHONE ENCOUNTER
Please review; protocol failed/no protocol    Requested Prescriptions   Pending Prescriptions Disp Refills    LORAZEPAM 0.5 MG Oral Tab [Pharmacy Med Name: Lorazepam 0.5 Mg Tab Auro] 30 tablet 0     Sig: TAKE ONE TABLET BY MOUTH ONE TIME NIGHTLY        There is no refill protocol information for this order            Recent Outpatient Visits              1 month ago Meningioma, cerebral ([facility] [address]    Lexy Chatterjee MD    Office Visit    1 month ago [address] [facility], Ashlee Rivers MD    Office Visit    3 months ago AK (actinic keratosis)    [address] [facility],[address] Floor Dermatology Chintan Burgess MD    Office Visit    4 months ago Other fatigue    [address] [facility], Ashlee Rivers MD    Office Visit    6 months ago AK (actinic keratosis)    [address] [facility] Floor Dermatology Chintan Burgess MD    Office Visit

## 2022-02-09 RX ORDER — LORAZEPAM 0.5 MG/1
0.5 TABLET ORAL NIGHTLY
Qty: 30 TABLET | Refills: 5 | Status: SHIPPED | OUTPATIENT
Start: 2022-02-09 | End: 2022-08-09

## 2022-07-01 ENCOUNTER — NURSE TRIAGE (OUTPATIENT)
Dept: INTERNAL MEDICINE CLINIC | Facility: CLINIC | Age: 87
End: 2022-07-01

## 2022-07-05 ENCOUNTER — HOSPITAL ENCOUNTER (OUTPATIENT)
Dept: GENERAL RADIOLOGY | Age: 87
Discharge: HOME | End: 2022-07-05
Attending: INTERNAL MEDICINE
Payer: MEDICARE

## 2022-07-05 ENCOUNTER — OFFICE VISIT (OUTPATIENT)
Dept: INTERNAL MEDICINE CLINIC | Facility: CLINIC | Age: 87
End: 2022-07-05
Payer: MEDICARE

## 2022-07-05 VITALS
OXYGEN SATURATION: 96 % | BODY MASS INDEX: 25.76 KG/M2 | DIASTOLIC BLOOD PRESSURE: 79 MMHG | HEART RATE: 90 BPM | SYSTOLIC BLOOD PRESSURE: 165 MMHG | HEIGHT: 63 IN | WEIGHT: 145.38 LBS

## 2022-07-05 DIAGNOSIS — M54.6 ACUTE MIDLINE THORACIC BACK PAIN: ICD-10-CM

## 2022-07-05 DIAGNOSIS — M54.6 ACUTE MIDLINE THORACIC BACK PAIN: Primary | ICD-10-CM

## 2022-07-05 PROCEDURE — 1125F AMNT PAIN NOTED PAIN PRSNT: CPT | Performed by: INTERNAL MEDICINE

## 2022-07-05 PROCEDURE — 99214 OFFICE O/P EST MOD 30 MIN: CPT | Performed by: INTERNAL MEDICINE

## 2022-07-05 PROCEDURE — 71046 X-RAY EXAM CHEST 2 VIEWS: CPT | Performed by: INTERNAL MEDICINE

## 2022-07-05 PROCEDURE — 72072 X-RAY EXAM THORAC SPINE 3VWS: CPT | Performed by: INTERNAL MEDICINE

## 2022-07-05 RX ORDER — HYDROCODONE BITARTRATE AND ACETAMINOPHEN 7.5; 325 MG/1; MG/1
1 TABLET ORAL EVERY 6 HOURS PRN
Qty: 28 TABLET | Refills: 0 | Status: SHIPPED | OUTPATIENT
Start: 2022-07-05

## 2022-07-05 RX ORDER — PREDNISONE 10 MG/1
TABLET ORAL
Qty: 12 TABLET | Refills: 0 | Status: SHIPPED | OUTPATIENT
Start: 2022-07-05 | End: 2022-07-11

## 2022-07-05 NOTE — TELEPHONE ENCOUNTER
Pt was called and she did not call us back. Pt stated that she continues to have the back pain. Pt was given a appt to see  today.   Future Appointments   Date Time Provider Bro Sewell   7/5/2022 11:00 AM Courtney Maurer MD [address]

## 2022-07-11 ENCOUNTER — TELEPHONE (OUTPATIENT)
Dept: INTERNAL MEDICINE CLINIC | Facility: CLINIC | Age: 87
End: 2022-07-11

## 2022-07-11 RX ORDER — PREDNISONE 10 MG/1
TABLET ORAL
Qty: 12 TABLET | Refills: 0 | Status: SHIPPED | OUTPATIENT
Start: 2022-07-11

## 2022-07-11 NOTE — TELEPHONE ENCOUNTER
Per patient she completed the steroid therapy and now the pain is back. Patient c/o back of neck/cervical pain 8/10. Patient wants to know if she can have more prednisone. Please advise.

## 2022-07-11 NOTE — TELEPHONE ENCOUNTER
I  send  rx  for  prednison again to the pharmacy , if pain returns may need  to be seen again  by me or  see  physiatry dr Parker/ Jhonny Benjamin [phone], she Danielle Odell make  appt  with s[ecialist  just in case if not better she has  appt

## 2022-07-26 RX ORDER — HYDROCODONE BITARTRATE AND ACETAMINOPHEN 7.5; 325 MG/1; MG/1
TABLET ORAL
Qty: 28 TABLET | Refills: 0 | Status: SHIPPED | OUTPATIENT
Start: 2022-07-26

## 2022-08-09 RX ORDER — LORAZEPAM 0.5 MG/1
0.5 TABLET ORAL NIGHTLY
Qty: 30 TABLET | Refills: 0 | OUTPATIENT
Start: 2022-08-09 | End: 2022-08-11 | Stop reason: CLARIF

## 2022-08-11 ENCOUNTER — TELEPHONE (OUTPATIENT)
Dept: INTERNAL MEDICINE CLINIC | Facility: CLINIC | Age: 87
End: 2022-08-11

## 2022-08-11 RX ORDER — LORAZEPAM 0.5 MG/1
0.5 TABLET ORAL NIGHTLY
Qty: 30 TABLET | Refills: 3 | Status: SHIPPED | OUTPATIENT
Start: 2022-08-11 | End: 2022-12-06

## 2022-08-11 NOTE — TELEPHONE ENCOUNTER
Letty from [address] [place] [address] in stating they have not received the following medication but it has been approved from Dr. Jane Love. Letty is requesting for the script to be resent.  Please advise    LORAZEPAM 0.5 MG Oral Tab
no

## 2022-08-12 ENCOUNTER — TELEPHONE (OUTPATIENT)
Dept: INTERNAL MEDICINE CLINIC | Facility: CLINIC | Age: 87
End: 2022-08-12

## 2022-09-20 ENCOUNTER — TELEPHONE (OUTPATIENT)
Dept: RADIATION ONCOLOGY | Facility: HOSPITAL | Age: 87
End: 2022-09-20

## 2022-10-14 ENCOUNTER — TELEPHONE (OUTPATIENT)
Dept: INTERNAL MEDICINE CLINIC | Facility: CLINIC | Age: 87
End: 2022-10-14

## 2022-10-14 NOTE — TELEPHONE ENCOUNTER
Action Requested: Summary for Provider     []  Critical Lab, Recommendations Needed  [] Need Additional Advice  []   FYI    []   Need Orders  [] Need Medications Sent to Pharmacy  []  Other     SUMMARY: recommendations needed for symptoms of dizziness. Per CSS pt was calling to schedule PX, but mentioned to CSS she feels dizzy. Spoke with pt who states since tx for tumor in her brain she feels dizzy \"I walk like I am drunk\"    Pt states she has to hold on to objects when she walks. Pt denies feeling faint, chest pain, SOB, weakness, headaches, blurred vision,     Per pt \"Dr Charlie Chambers knows about this and I just want to schedule a physical, I don't need to be seen anytime soon\"    Please advise if pt needs eval for dizziness. Next opening with Dr Charlie Chambers is 10/18/22    Pt advised to go to ER if symptoms worsen and pt agrees to plan.      Reason for call: Acute  Onset: Data Unavailable

## 2022-10-14 NOTE — TELEPHONE ENCOUNTER
Dr Kodi Orona, do you want to see the pt 10/18/22 your next opening.   She declines ER and just wants to schedule a physical.

## 2022-10-17 NOTE — TELEPHONE ENCOUNTER
FYI: Dr Bro Santoro is fully booked for tomorrow 10/18/2022, would she like us to double book patient if so, what time, Please advise, Thank you.     Please reply to pool: EM CC IM FM ALG RHE

## 2022-10-24 ENCOUNTER — OFFICE VISIT (OUTPATIENT)
Dept: INTERNAL MEDICINE CLINIC | Facility: CLINIC | Age: 87
End: 2022-10-24
Payer: MEDICARE

## 2022-10-24 VITALS
WEIGHT: 146 LBS | DIASTOLIC BLOOD PRESSURE: 78 MMHG | HEIGHT: 63 IN | OXYGEN SATURATION: 97 % | SYSTOLIC BLOOD PRESSURE: 139 MMHG | BODY MASS INDEX: 25.87 KG/M2 | HEART RATE: 96 BPM

## 2022-10-24 DIAGNOSIS — R53.83 OTHER FATIGUE: ICD-10-CM

## 2022-10-24 DIAGNOSIS — R42 DIZZINESS: ICD-10-CM

## 2022-10-24 DIAGNOSIS — R26.89 BALANCE DISORDER: ICD-10-CM

## 2022-10-24 DIAGNOSIS — Z91.81 AT RISK FOR FALLS: ICD-10-CM

## 2022-10-24 DIAGNOSIS — Z00.00 MEDICARE ANNUAL WELLNESS VISIT, SUBSEQUENT: Primary | ICD-10-CM

## 2022-10-24 DIAGNOSIS — R43.2 TASTE SENSE ALTERED: ICD-10-CM

## 2022-10-24 DIAGNOSIS — F41.9 ANXIETY: ICD-10-CM

## 2022-10-24 DIAGNOSIS — D32.0 MENINGIOMA OF CEREBELLUM (HCC): ICD-10-CM

## 2022-10-24 DIAGNOSIS — E55.9 VITAMIN D DEFICIENCY: ICD-10-CM

## 2022-10-24 PROCEDURE — G0439 PPPS, SUBSEQ VISIT: HCPCS | Performed by: INTERNAL MEDICINE

## 2022-10-24 PROCEDURE — 1125F AMNT PAIN NOTED PAIN PRSNT: CPT | Performed by: INTERNAL MEDICINE

## 2022-10-29 PROBLEM — K14.6 PAINFUL TONGUE: Status: RESOLVED | Noted: 2021-01-31 | Resolved: 2022-10-29

## 2022-11-17 ENCOUNTER — HOSPITAL ENCOUNTER (OUTPATIENT)
Dept: MRI IMAGING | Age: 87
Discharge: HOME | End: 2022-11-17
Attending: RADIOLOGY
Payer: MEDICARE

## 2022-11-17 DIAGNOSIS — D32.0 MENINGIOMA, CEREBRAL (HCC): ICD-10-CM

## 2022-11-17 PROCEDURE — 70553 MRI BRAIN STEM W/O & W/DYE: CPT | Performed by: RADIOLOGY

## 2022-11-17 PROCEDURE — A9575 INJ GADOTERATE MEGLUMI 0.1ML: HCPCS | Performed by: RADIOLOGY

## 2022-11-17 RX ORDER — GADOTERATE MEGLUMINE 376.9 MG/ML
15 INJECTION INTRAVENOUS
Status: COMPLETED | OUTPATIENT
Start: 2022-11-17 | End: 2022-11-17

## 2022-11-17 RX ADMIN — GADOTERATE MEGLUMINE 15 ML: 376.9 INJECTION INTRAVENOUS at 11:53:00

## 2022-12-10 ENCOUNTER — LAB ENCOUNTER (OUTPATIENT)
Dept: LAB | Age: 87
End: 2022-12-10
Attending: INTERNAL MEDICINE
Payer: MEDICARE

## 2022-12-10 DIAGNOSIS — R53.83 OTHER FATIGUE: ICD-10-CM

## 2022-12-10 DIAGNOSIS — R42 DIZZINESS: ICD-10-CM

## 2022-12-10 DIAGNOSIS — R43.2 TASTE SENSE ALTERED: ICD-10-CM

## 2022-12-10 DIAGNOSIS — E55.9 VITAMIN D DEFICIENCY: ICD-10-CM

## 2022-12-10 LAB
ALBUMIN SERPL-MCNC: 3.9 G/DL (ref 3.4–5)
ALBUMIN/GLOB SERPL: 1.1 {RATIO} (ref 1–2)
ALP LIVER SERPL-CCNC: 79 U/L
ALT SERPL-CCNC: 14 U/L
ANION GAP SERPL CALC-SCNC: 5 MMOL/L (ref 0–18)
AST SERPL-CCNC: 12 U/L (ref 15–37)
BASOPHILS # BLD AUTO: 0.04 X10(3) UL (ref 0–0.2)
BASOPHILS NFR BLD AUTO: 0.5 %
BILIRUB SERPL-MCNC: 0.5 MG/DL (ref 0.1–2)
BUN BLD-MCNC: 13 MG/DL (ref 7–18)
BUN/CREAT SERPL: 17.1 (ref 10–20)
CALCIUM BLD-MCNC: 9.5 MG/DL (ref 8.5–10.1)
CHLORIDE SERPL-SCNC: 106 MMOL/L (ref 98–112)
CO2 SERPL-SCNC: 29 MMOL/L (ref 21–32)
CREAT BLD-MCNC: 0.76 MG/DL
DEPRECATED RDW RBC AUTO: 39.5 FL (ref 35.1–46.3)
EOSINOPHIL # BLD AUTO: 0.17 X10(3) UL (ref 0–0.7)
EOSINOPHIL NFR BLD AUTO: 2.2 %
ERYTHROCYTE [DISTWIDTH] IN BLOOD BY AUTOMATED COUNT: 11.9 % (ref 11–15)
FASTING STATUS PATIENT QL REPORTED: NO
GFR SERPLBLD BASED ON 1.73 SQ M-ARVRAT: 75 ML/MIN/1.73M2 (ref 60–?)
GLOBULIN PLAS-MCNC: 3.4 G/DL (ref 2.8–4.4)
GLUCOSE BLD-MCNC: 91 MG/DL (ref 70–99)
HCT VFR BLD AUTO: 43 %
HGB BLD-MCNC: 14 G/DL
IMM GRANULOCYTES # BLD AUTO: 0.02 X10(3) UL (ref 0–1)
IMM GRANULOCYTES NFR BLD: 0.3 %
LYMPHOCYTES # BLD AUTO: 2.23 X10(3) UL (ref 1–4)
LYMPHOCYTES NFR BLD AUTO: 28.6 %
MCH RBC QN AUTO: 29.4 PG (ref 26–34)
MCHC RBC AUTO-ENTMCNC: 32.6 G/DL (ref 31–37)
MCV RBC AUTO: 90.1 FL
MONOCYTES # BLD AUTO: 0.66 X10(3) UL (ref 0.1–1)
MONOCYTES NFR BLD AUTO: 8.5 %
NEUTROPHILS # BLD AUTO: 4.69 X10 (3) UL (ref 1.5–7.7)
NEUTROPHILS # BLD AUTO: 4.69 X10(3) UL (ref 1.5–7.7)
NEUTROPHILS NFR BLD AUTO: 59.9 %
OSMOLALITY SERPL CALC.SUM OF ELEC: 290 MOSM/KG (ref 275–295)
PLATELET # BLD AUTO: 381 10(3)UL (ref 150–450)
POTASSIUM SERPL-SCNC: 4.5 MMOL/L (ref 3.5–5.1)
PROT SERPL-MCNC: 7.3 G/DL (ref 6.4–8.2)
RBC # BLD AUTO: 4.77 X10(6)UL
SODIUM SERPL-SCNC: 140 MMOL/L (ref 136–145)
TSI SER-ACNC: 2.49 MIU/ML (ref 0.36–3.74)
VIT D+METAB SERPL-MCNC: 20.1 NG/ML (ref 30–100)
WBC # BLD AUTO: 7.8 X10(3) UL (ref 4–11)

## 2022-12-10 PROCEDURE — 84443 ASSAY THYROID STIM HORMONE: CPT

## 2022-12-10 PROCEDURE — 80053 COMPREHEN METABOLIC PANEL: CPT

## 2022-12-10 PROCEDURE — 85025 COMPLETE CBC W/AUTO DIFF WBC: CPT

## 2022-12-10 PROCEDURE — 36415 COLL VENOUS BLD VENIPUNCTURE: CPT

## 2022-12-10 PROCEDURE — 82306 VITAMIN D 25 HYDROXY: CPT

## 2022-12-16 ENCOUNTER — OFFICE VISIT (OUTPATIENT)
Dept: RADIATION ONCOLOGY | Facility: HOSPITAL | Age: 87
End: 2022-12-16
Attending: RADIOLOGY
Payer: MEDICARE

## 2022-12-16 VITALS
SYSTOLIC BLOOD PRESSURE: 136 MMHG | OXYGEN SATURATION: 95 % | WEIGHT: 146.63 LBS | BODY MASS INDEX: 26 KG/M2 | RESPIRATION RATE: 20 BRPM | HEART RATE: 68 BPM | TEMPERATURE: 98 F | DIASTOLIC BLOOD PRESSURE: 68 MMHG

## 2022-12-16 DIAGNOSIS — D32.0 MENINGIOMA, CEREBRAL (HCC): Primary | ICD-10-CM

## 2022-12-16 PROCEDURE — 99213 OFFICE O/P EST LOW 20 MIN: CPT

## 2022-12-16 NOTE — PATIENT INSTRUCTIONS
- WE WILL CALL TO SCHEDULE YOUR FOLLOW-UP APPOINTMENT WITH DR. DANUTA LORENZ IN 1 YEAR, AFTER YOUR NEXT MRI    - CALL CENTRAL SCHEDULING AT [phone] TO SCHEDULE YOUR NEXT MRI IN NOVEMBER/DECEMBER 2023    - CALL [phone] IF YOU HAVE ANY QUESTIONS/CONCERNS REGARDING RADIATION THERAPY

## 2022-12-17 NOTE — PROGRESS NOTES
[facility]    PATIENT'S NAME: Norajonah Matias   RADIATION ONCOLOGIST: Tab Soliman. 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 REGION TREATED:  CP angle meningioma, 5040 cGy, completed 01/26/2021. INTERVAL SINCE COMPLETION OF RADIATION THERAPY:  1 year and 11 months. PATIENT STATUS:  Radiographically controlled. HISTORY:  The patient is an 35-year-old female who presents today for a routine followup visit. She has a history of a meningioma found in the right CP angle. She had been having symptoms of insomnia and unsteadiness and a CT scan of the brain showed a 2.9 cm ovoid extra-axial right CP angle lesion noted to efface the right lateral brainstem. A followup MRI showed this to be a 2.9 cm right anterior CP angle dural-based extra-axial mass demonstrating homogeneous enhancement. It was thought very likely to represent a meningioma. This did result in mass effect and leftward displacement of the taye and right brachium pontis. It was felt that the patient was not a surgical candidate and would be best treated with fractionated radiotherapy. I treated her to 5040 cGy in 180 cGy fractions with the limited field, and she completed those treatments on 01/26/2021. On her visit today, the patient continues to do reasonably well. She has stated throughout that she has a fairly poor appetite, though her weight has always been stable or even gone up since we first met. She continues to have some issues with a metallic sense of taste, but this predates her radiation and no etiology was ever found. She denies any other headaches or issues. She does report some dizziness at times. Her most recent MRI was on 11/17/2022.   This showed a stable meningioma without significant change in size or appearance and no other new findings. PHYSICAL EXAMINATION:    VITAL SIGNS:  On exam today, the patient is noted to have a blood pressure of 136/68, pulse of 68, respiratory rate of 20, and a temperature of 97.5. Her weight is stable at 146 pounds. NECK:  Supple with no lymphadenopathy. LUNGS:  Clear to auscultation bilaterally. HEART:  Regular rate and rhythm. Normal S1, S2 with no audible no murmurs. LYMPHATICS:  There is no supraclavicular, axillary, or inguinal lymphadenopathy. ABDOMEN:  Benign. IMPRESSION AND RECOMMENDATIONS:  Overall, the patient is doing very well at this time. She is clinically and radiographically stable and her disease has been controlled. I see no evidence of any progression on MRI and certainly nothing that could account for her recent symptoms including intermittent dizziness. Her complaints of weight loss and altered sense of taste are longstanding and predate her radiation, and seemingly are difficult to interpret given that her weight has actually been going up. I have told her to follow up with her primary care physician regarding her dizziness, though she certainly can try an over-the-counter agent such as meclizine. Otherwise, I would like to see the patient again for followup in 1 year, and we will get an MRI again preceding that visit. I told her that if she should have any problems or questions prior to that time, she should feel free to contact my office and I would be happy to see her sooner. Thank you very much for allowing me the opportunity to participate in the care of this patient. If there should be any questions regarding the radiotherapy, please feel free to contact me at any time. Dictated By Kriste Hatchet Marygrace Bougie, MD  d: 12/16/2022 14:08:53  t: 12/17/2022 05:08:06  Job [id number]  NAD/    cc: Ángel Luis MD

## 2023-01-16 ENCOUNTER — OFFICE VISIT (OUTPATIENT)
Dept: SURGERY | Facility: CLINIC | Age: 88
End: 2023-01-16

## 2023-01-16 DIAGNOSIS — M79.642 PAIN OF LEFT HAND: Primary | ICD-10-CM

## 2023-01-16 PROCEDURE — 99203 OFFICE O/P NEW LOW 30 MIN: CPT | Performed by: PLASTIC SURGERY

## 2023-01-16 PROCEDURE — 1125F AMNT PAIN NOTED PAIN PRSNT: CPT | Performed by: PLASTIC SURGERY

## 2023-01-23 ENCOUNTER — HOSPITAL ENCOUNTER (EMERGENCY)
Facility: HOSPITAL | Age: 88
Discharge: HOME | End: 2023-01-23
Attending: EMERGENCY MEDICINE
Payer: MEDICARE

## 2023-01-23 ENCOUNTER — OFFICE VISIT (OUTPATIENT)
Dept: INTERNAL MEDICINE CLINIC | Facility: CLINIC | Age: 88
End: 2023-01-23

## 2023-01-23 VITALS
TEMPERATURE: 97 F | RESPIRATION RATE: 18 BRPM | DIASTOLIC BLOOD PRESSURE: 69 MMHG | SYSTOLIC BLOOD PRESSURE: 145 MMHG | HEIGHT: 63 IN | BODY MASS INDEX: 24.98 KG/M2 | OXYGEN SATURATION: 99 % | WEIGHT: 141 LBS | HEART RATE: 96 BPM

## 2023-01-23 VITALS
DIASTOLIC BLOOD PRESSURE: 66 MMHG | BODY MASS INDEX: 25.34 KG/M2 | HEIGHT: 63 IN | HEART RATE: 130 BPM | SYSTOLIC BLOOD PRESSURE: 96 MMHG | WEIGHT: 143 LBS

## 2023-01-23 DIAGNOSIS — R19.7 DIARRHEA, UNSPECIFIED TYPE: Primary | ICD-10-CM

## 2023-01-23 LAB
ALBUMIN SERPL-MCNC: 3.9 G/DL (ref 3.4–5)
ALP LIVER SERPL-CCNC: 74 U/L
ALT SERPL-CCNC: 13 U/L
ANION GAP SERPL CALC-SCNC: 6 MMOL/L (ref 0–18)
AST SERPL-CCNC: 14 U/L (ref 15–37)
BASOPHILS # BLD AUTO: 0.02 X10(3) UL (ref 0–0.2)
BASOPHILS NFR BLD AUTO: 0.3 %
BILIRUB DIRECT SERPL-MCNC: 0.1 MG/DL (ref 0–0.2)
BILIRUB SERPL-MCNC: 0.5 MG/DL (ref 0.1–2)
BILIRUB UR QL: NEGATIVE
BUN BLD-MCNC: 18 MG/DL (ref 7–18)
BUN/CREAT SERPL: 17.8 (ref 10–20)
CALCIUM BLD-MCNC: 10 MG/DL (ref 8.5–10.1)
CHLORIDE SERPL-SCNC: 105 MMOL/L (ref 98–112)
CLARITY UR: CLEAR
CO2 SERPL-SCNC: 28 MMOL/L (ref 21–32)
COLOR UR: YELLOW
CREAT BLD-MCNC: 1.01 MG/DL
DEPRECATED RDW RBC AUTO: 41.2 FL (ref 35.1–46.3)
EOSINOPHIL # BLD AUTO: 0.07 X10(3) UL (ref 0–0.7)
EOSINOPHIL NFR BLD AUTO: 1 %
ERYTHROCYTE [DISTWIDTH] IN BLOOD BY AUTOMATED COUNT: 12.6 % (ref 11–15)
GFR SERPLBLD BASED ON 1.73 SQ M-ARVRAT: 54 ML/MIN/1.73M2 (ref 60–?)
GLUCOSE BLD-MCNC: 99 MG/DL (ref 70–99)
GLUCOSE UR-MCNC: NEGATIVE MG/DL
HCT VFR BLD AUTO: 43.4 %
HGB BLD-MCNC: 14.4 G/DL
IMM GRANULOCYTES # BLD AUTO: 0.02 X10(3) UL (ref 0–1)
IMM GRANULOCYTES NFR BLD: 0.3 %
KETONES UR-MCNC: NEGATIVE MG/DL
LIPASE SERPL-CCNC: 131 U/L (ref 73–393)
LYMPHOCYTES # BLD AUTO: 2.48 X10(3) UL (ref 1–4)
LYMPHOCYTES NFR BLD AUTO: 33.8 %
MCH RBC QN AUTO: 29.3 PG (ref 26–34)
MCHC RBC AUTO-ENTMCNC: 33.2 G/DL (ref 31–37)
MCV RBC AUTO: 88.4 FL
MONOCYTES # BLD AUTO: 0.63 X10(3) UL (ref 0.1–1)
MONOCYTES NFR BLD AUTO: 8.6 %
NEUTROPHILS # BLD AUTO: 4.11 X10 (3) UL (ref 1.5–7.7)
NEUTROPHILS # BLD AUTO: 4.11 X10(3) UL (ref 1.5–7.7)
NEUTROPHILS NFR BLD AUTO: 56 %
NITRITE UR QL STRIP.AUTO: NEGATIVE
OSMOLALITY SERPL CALC.SUM OF ELEC: 290 MOSM/KG (ref 275–295)
PH UR: 5.5 [PH] (ref 5–8)
PLATELET # BLD AUTO: 366 10(3)UL (ref 150–450)
POTASSIUM SERPL-SCNC: 3.8 MMOL/L (ref 3.5–5.1)
PROT SERPL-MCNC: 8 G/DL (ref 6.4–8.2)
PROT UR-MCNC: NEGATIVE MG/DL
RBC # BLD AUTO: 4.91 X10(6)UL
SODIUM SERPL-SCNC: 139 MMOL/L (ref 136–145)
SP GR UR STRIP: 1.01 (ref 1–1.03)
UROBILINOGEN UR STRIP-ACNC: 0.2
WBC # BLD AUTO: 7.3 X10(3) UL (ref 4–11)

## 2023-01-23 PROCEDURE — 96360 HYDRATION IV INFUSION INIT: CPT

## 2023-01-23 PROCEDURE — 87086 URINE CULTURE/COLONY COUNT: CPT | Performed by: EMERGENCY MEDICINE

## 2023-01-23 PROCEDURE — 81015 MICROSCOPIC EXAM OF URINE: CPT | Performed by: EMERGENCY MEDICINE

## 2023-01-23 PROCEDURE — 80048 BASIC METABOLIC PNL TOTAL CA: CPT | Performed by: EMERGENCY MEDICINE

## 2023-01-23 PROCEDURE — 81001 URINALYSIS AUTO W/SCOPE: CPT | Performed by: EMERGENCY MEDICINE

## 2023-01-23 PROCEDURE — 87077 CULTURE AEROBIC IDENTIFY: CPT | Performed by: EMERGENCY MEDICINE

## 2023-01-23 PROCEDURE — 99284 EMERGENCY DEPT VISIT MOD MDM: CPT

## 2023-01-23 PROCEDURE — 87186 SC STD MICRODIL/AGAR DIL: CPT | Performed by: EMERGENCY MEDICINE

## 2023-01-23 PROCEDURE — 80076 HEPATIC FUNCTION PANEL: CPT | Performed by: EMERGENCY MEDICINE

## 2023-01-23 PROCEDURE — 85025 COMPLETE CBC W/AUTO DIFF WBC: CPT | Performed by: EMERGENCY MEDICINE

## 2023-01-23 PROCEDURE — 83690 ASSAY OF LIPASE: CPT | Performed by: EMERGENCY MEDICINE

## 2023-01-23 RX ORDER — SACCHAROMYCES BOULARDII 250 MG
250 CAPSULE ORAL 2 TIMES DAILY
Qty: 28 CAPSULE | Refills: 0 | Status: SHIPPED | OUTPATIENT
Start: 2023-01-23 | End: 2023-02-06

## 2023-01-23 NOTE — ED INITIAL ASSESSMENT (HPI)
Pt AOx4 C/C diarrhea x 4 weeks and 'hot belly pain' when she eats, tried to see PCP but no availability. Denies nausea/vomiting.

## 2023-01-24 NOTE — ED PROVIDER NOTES
Signed epic. Shift to follow-up results of diagnostic work-up and disposition patient. Patient has had watery diarrhea for 4 weeks. Her laboratory work-up is relatively unremarkable. She does have evidence of UTI however patient states she has no symptoms of dysuria, fevers, abdominal pain. We will hold off on giving any UTI treatment as patient is already experiencing diarrhea. We will have patient follow-up with her primary care physician. Patient's heart rate is 85 bpm after completing 1 L of IV fluids.

## 2023-02-02 ENCOUNTER — LAB ENCOUNTER (OUTPATIENT)
Dept: LAB | Age: 88
End: 2023-02-02
Attending: NURSE PRACTITIONER
Payer: MEDICARE

## 2023-02-02 DIAGNOSIS — R19.7 DIARRHEA, UNSPECIFIED TYPE: ICD-10-CM

## 2023-02-02 LAB
CRYPTOSP AG STL QL IA: NEGATIVE
G LAMBLIA AG STL QL IA: NEGATIVE

## 2023-02-02 PROCEDURE — 87046 STOOL CULTR AEROBIC BACT EA: CPT

## 2023-02-02 PROCEDURE — 87045 FECES CULTURE AEROBIC BACT: CPT

## 2023-02-02 PROCEDURE — 87493 C DIFF AMPLIFIED PROBE: CPT

## 2023-02-02 PROCEDURE — 87427 SHIGA-LIKE TOXIN AG IA: CPT

## 2023-02-02 PROCEDURE — 87272 CRYPTOSPORIDIUM AG IF: CPT

## 2023-02-02 PROCEDURE — 87338 HPYLORI STOOL AG IA: CPT

## 2023-02-02 PROCEDURE — 87329 GIARDIA AG IA: CPT

## 2023-02-03 LAB
C DIFF TOX B STL QL: NEGATIVE
H PYLORI AG STL QL IA: NEGATIVE

## 2023-02-06 ENCOUNTER — TELEPHONE (OUTPATIENT)
Dept: INTERNAL MEDICINE CLINIC | Facility: CLINIC | Age: 88
End: 2023-02-06

## 2023-03-03 RX ORDER — LORAZEPAM 0.5 MG/1
0.5 TABLET ORAL NIGHTLY
Qty: 90 TABLET | Refills: 1 | Status: SHIPPED | OUTPATIENT
Start: 2023-03-03

## 2023-03-04 NOTE — TELEPHONE ENCOUNTER
Please review refill failed/no protocol     Requested Prescriptions     Pending Prescriptions Disp Refills    LORAZEPAM 0.5 MG Oral Tab [Pharmacy Med Name: Lorazepam 0.5 Mg Tab Teva] 30 tablet 0     Sig: Take 1 tablet (0.5 mg total) by mouth nightly. Recent Visits  Date Type Provider Dept   01/23/23 Office Visit JOSÉ De La Garza [facility]   10/24/22 Office Visit Johana Neves MD [facility]   07/05/22 Office Visit Johana Neves MD [facility]   02/25/22 Office Visit Johana Neves MD [facility]   12/14/21 Office Visit Johaan Neves MD [facility]   09/27/21 Office Visit Johana Neves MD [facility]   Showing recent visits within past 540 days with a meds authorizing provider and meeting all other requirements  Future Appointments  No visits were found meeting these conditions. Showing future appointments within next 150 days with a meds authorizing provider and meeting all other requirements    Requested Prescriptions   Pending Prescriptions Disp Refills    LORAZEPAM 0.5 MG Oral Tab [Pharmacy Med Name: Lorazepam 0.5 Mg Tab Teva] 30 tablet 0     Sig: Take 1 tablet (0.5 mg total) by mouth nightly.        There is no refill protocol information for this order

## 2023-03-13 ENCOUNTER — NURSE TRIAGE (OUTPATIENT)
Dept: INTERNAL MEDICINE CLINIC | Facility: CLINIC | Age: 88
End: 2023-03-13

## 2023-03-14 ENCOUNTER — OFFICE VISIT (OUTPATIENT)
Dept: INTERNAL MEDICINE CLINIC | Facility: CLINIC | Age: 88
End: 2023-03-14

## 2023-03-14 VITALS
HEIGHT: 63 IN | HEART RATE: 90 BPM | DIASTOLIC BLOOD PRESSURE: 75 MMHG | SYSTOLIC BLOOD PRESSURE: 117 MMHG | BODY MASS INDEX: 25.34 KG/M2 | TEMPERATURE: 98 F | WEIGHT: 143 LBS

## 2023-03-14 DIAGNOSIS — R27.0 ATAXIA: Primary | ICD-10-CM

## 2023-03-14 DIAGNOSIS — F41.9 ANXIETY: ICD-10-CM

## 2023-03-14 DIAGNOSIS — Z91.81 AT RISK FOR FALLS: ICD-10-CM

## 2023-03-14 PROCEDURE — 1126F AMNT PAIN NOTED NONE PRSNT: CPT | Performed by: INTERNAL MEDICINE

## 2023-03-14 PROCEDURE — 99213 OFFICE O/P EST LOW 20 MIN: CPT | Performed by: INTERNAL MEDICINE

## 2023-03-14 RX ORDER — MECLIZINE HYDROCHLORIDE 25 MG/1
TABLET ORAL
Qty: 60 TABLET | Refills: 0 | Status: SHIPPED | OUTPATIENT
Start: 2023-03-14

## 2023-03-23 NOTE — TELEPHONE ENCOUNTER
Spoke with Kristian from patients preferred pharmacy [facility]). Kristian provided our office fax number  [phone]. To begin prior authorization for the medication Trospium. Kristian stated she will fax paper work to the above fax number. Hpi Title: Evaluation of Skin Lesions How Severe Are Your Spot(S)?: mild Have Your Spot(S) Been Treated In The Past?: has not been treated

## 2023-04-06 RX ORDER — MECLIZINE HYDROCHLORIDE 25 MG/1
TABLET ORAL
Qty: 60 TABLET | Refills: 1 | Status: SHIPPED | OUTPATIENT
Start: 2023-04-06

## 2023-04-06 NOTE — TELEPHONE ENCOUNTER
No protocol for requested medication. Please advise on refill request.      Requested Prescriptions     Pending Prescriptions Disp Refills    MECLIZINE 25 MG Oral Tab [Pharmacy Med Name: Meclizine Hydrochloride 25 Mg Tab Zydu] 60 tablet 0     Sig: Take 1 tab by mouth twice a day as needed      Recent Visits  Date Type Provider Dept   03/14/23 Office Visit Sandro Sun MD [facility]   01/23/23 Office Visit JOSÉ Rooney [facility]   10/24/22 Office Visit Sandro Sun MD [facility]   07/05/22 Office Visit Sandro Sun MD [facility]   02/25/22 Office Visit Sandro Sun MD [facility]   12/14/21 Office Visit Sandro Sun MD [facility]   Showing recent visits within past 540 days with a meds authorizing provider and meeting all other requirements  Future Appointments  No visits were found meeting these conditions.   Showing future appointments within next 150 days with a meds authorizing provider and meeting all other requirements     Requested Prescriptions   Pending Prescriptions Disp Refills    MECLIZINE 25 MG Oral Tab [Pharmacy Med Name: Meclizine Hydrochloride 25 Mg Tab Zydu] 60 tablet 0     Sig: Take 1 tab by mouth twice a day as needed       Gastrointestional Medication Protocol Passed - 4/5/2023  4:04 PM        Passed - In person appointment or virtual visit in the past 12 mos or appointment in next 3 mos     Recent Outpatient Visits              3 weeks ago Michele Peguero, [facility], Ashlee Perez MD    Office Visit    2 months ago Diarrhea, unspecified type    [facility], [facility], [place] SasJessie., APRN    Office Visit    2 months ago Pain of left hand    [facility], [address], [place] Lucy Bear MD    Office Visit    3 months ago Meningioma, cerebral ([address]    Ester Sam MD    Office Visit    5 months ago Neda Schumacher annual wellness visit, subsequent    Kayla Germain, Vandana Mejia MD    Office Visit          Future Appointments         Provider Department Appt Notes    In 1 week Sangita Marina MD [address] [place] skin issue                   Future Appointments         Provider Department Appt Notes    In 1 week Sangita Marina MD [address], [address] [facility] Sugar Rogers skin issue           Recent Outpatient Visits              3 weeks ago [address] [facility], Ashlee Rivas MD    Office Visit    2 months ago Diarrhea, unspecified type    [facility], [place], Ever Rangel., APRN    Office Visit    2 months ago Pain of left hand    [address], [address], [place] Ananth Portillo MD    Office Visit    3 months ago Meningioma, cerebral ([facility]    Tomy Padron MD    Office Visit    5 months ago Medicare annual wellness visit, subsequent    Huong Subramanian [place], MD    Office Visit

## 2023-04-11 ENCOUNTER — TELEPHONE (OUTPATIENT)
Dept: INTERNAL MEDICINE CLINIC | Facility: CLINIC | Age: 88
End: 2023-04-11

## 2023-04-19 ENCOUNTER — OFFICE VISIT (OUTPATIENT)
Dept: DERMATOLOGY CLINIC | Facility: CLINIC | Age: 88
End: 2023-04-19

## 2023-04-19 DIAGNOSIS — L82.1 SEBORRHEIC KERATOSES: Primary | ICD-10-CM

## 2023-04-19 DIAGNOSIS — D23.5 BENIGN NEOPLASM OF SKIN OF TRUNK: ICD-10-CM

## 2023-04-19 DIAGNOSIS — D23.30 BENIGN NEOPLASM OF SKIN OF FACE: ICD-10-CM

## 2023-04-19 DIAGNOSIS — Z85.828 HISTORY OF NONMELANOMA SKIN CANCER: ICD-10-CM

## 2023-04-19 DIAGNOSIS — L57.0 AK (ACTINIC KERATOSIS): ICD-10-CM

## 2023-04-19 DIAGNOSIS — D23.4 BENIGN NEOPLASM OF SCALP AND SKIN OF NECK: ICD-10-CM

## 2023-04-19 DIAGNOSIS — D23.60 BENIGN NEOPLASM OF SKIN OF UPPER LIMB, INCLUDING SHOULDER, UNSPECIFIED LATERALITY: ICD-10-CM

## 2023-04-19 DIAGNOSIS — D22.9 MULTIPLE NEVI: ICD-10-CM

## 2023-04-19 DIAGNOSIS — D23.70 BENIGN NEOPLASM OF SKIN OF LOWER LIMB, INCLUDING HIP, UNSPECIFIED LATERALITY: ICD-10-CM

## 2023-04-19 PROCEDURE — 99213 OFFICE O/P EST LOW 20 MIN: CPT | Performed by: DERMATOLOGY

## 2023-04-19 PROCEDURE — 17000 DESTRUCT PREMALG LESION: CPT | Performed by: DERMATOLOGY

## 2023-04-19 PROCEDURE — 17003 DESTRUCT PREMALG LES 2-14: CPT | Performed by: DERMATOLOGY

## 2023-07-19 ENCOUNTER — NURSE TRIAGE (OUTPATIENT)
Dept: INTERNAL MEDICINE CLINIC | Facility: CLINIC | Age: 88
End: 2023-07-19

## 2023-07-19 NOTE — TELEPHONE ENCOUNTER
Pt stated  treated her Dizziness 3 months ago with Meclizine , rx have not helped , dizzy x 3 days, no vision difficult, no nausea ,staying hydrated,no dizziness with driving  , dizzy with walking, do not want to go to ER again , thinks this could be from the tumor , dizzy x 3 days  Mentioned have no one to take her lives alone but do not want to go   Pt stated maybe  will call her

## 2023-07-19 NOTE — TELEPHONE ENCOUNTER
Spoke to pt  adviied  evaluation in  [facility] ,  at least , she  agreed will got to [place], advisied her not to drive

## 2023-08-30 RX ORDER — LORAZEPAM 0.5 MG/1
0.5 TABLET ORAL NIGHTLY
Qty: 90 TABLET | Refills: 0 | Status: SHIPPED | OUTPATIENT
Start: 2023-08-30

## 2023-10-05 NOTE — TELEPHONE ENCOUNTER
amitriptyline 10 MG Oral Tab, Take by mouth nightly., Disp: , Rfl:     LORazepam 0.5 MG Oral Tab, Take 1 tablet (0.5 mg total) by mouth nightly., Disp: 90 tablet, Rfl: 0    meclizine 25 MG Oral Tab, Take 1 tab by mouth twice a day as needed, Disp: 60 tablet, Rfl: 1

## 2023-10-06 RX ORDER — MECLIZINE HYDROCHLORIDE 25 MG/1
TABLET ORAL
Qty: 60 TABLET | Refills: 5 | Status: SHIPPED | OUTPATIENT
Start: 2023-10-06

## 2023-10-06 RX ORDER — AMITRIPTYLINE HYDROCHLORIDE 10 MG/1
10 TABLET, FILM COATED ORAL NIGHTLY
Qty: 90 TABLET | Refills: 1 | Status: SHIPPED | OUTPATIENT
Start: 2023-10-06

## 2023-10-06 RX ORDER — LORAZEPAM 0.5 MG/1
0.5 TABLET ORAL NIGHTLY
Qty: 90 TABLET | Refills: 0 | Status: SHIPPED | OUTPATIENT
Start: 2023-10-06

## 2023-10-06 NOTE — TELEPHONE ENCOUNTER
Please review. Protocol failed / Has no protocol. Amitriptyline last written 5/24/22 by JOSÉ Gatica    Requested Prescriptions   Pending Prescriptions Disp Refills    amitriptyline 10 MG Oral Tab 90 tablet 1     Sig: Take 1 tablet (10 mg total) by mouth nightly. There is no refill protocol information for this order       LORazepam 0.5 MG Oral Tab 90 tablet 0     Sig: Take 1 tablet (0.5 mg total) by mouth nightly.        There is no refill protocol information for this order      Signed Prescriptions Disp Refills    meclizine 25 MG Oral Tab 60 tablet 5     Sig: Take 1 tab by mouth twice a day as needed       Gastrointestional Medication Protocol Passed - 10/5/2023  1:29 PM        Passed - In person appointment or virtual visit in the past 12 mos or appointment in next 3 mos     Recent Outpatient Visits              5 months ago Seborrheic keratoses    [facility] Lupe Carrera MD    Office Visit    6 months ago [facility], [employer], [place] Elaine Isabel MD    Office Visit    8 months ago Diarrhea, unspecified type    [facility], [facility], Albert Thomas., APRN    Office Visit    8 months ago Pain of left hand    [facility], [address] [place] [address],[address], [place] Paddy Fry MD    Office Visit    9 months ago Meningioma, cerebral ([facility]    Beth Feldman MD    Office Visit          Future Appointments         Provider Department Appt Notes    In 3 weeks Elaine Isabel MD [address], [address], [place] Last px 10/24/2022                       Future Appointments         Provider Department Appt Notes    In 3 weeks Elaine Isabel MD [address], [address], [place] Last px 10/24/2022           Recent Outpatient Visits              5 months ago Seborrheic keratoses    [employer] Noe Gaxiola MD    Office Visit    6 months ago [address] [facility], [place] Elaine Isabel MD    Office Visit    8 months ago Diarrhea, unspecified type    [facility], [facility] [address], [place], Clara Andre., APRN    Office Visit    8 months ago Pain of left hand    [address] [facility],[address], [address], [place] Paddy Fry MD    Office Visit    9 months ago Meningioma, cerebral ([facility] [address]    Beth Feldman MD    Office Visit

## 2023-10-06 NOTE — TELEPHONE ENCOUNTER
Refill passed per [facility] protocol. Requested Prescriptions   Pending Prescriptions Disp Refills    amitriptyline 10 MG Oral Tab 90 tablet 1     Sig: Take 1 tablet (10 mg total) by mouth nightly. There is no refill protocol information for this order       LORazepam 0.5 MG Oral Tab 90 tablet 0     Sig: Take 1 tablet (0.5 mg total) by mouth nightly.        There is no refill protocol information for this order       meclizine 25 MG Oral Tab 60 tablet 1     Sig: Take 1 tab by mouth twice a day as needed       Gastrointestional Medication Protocol Passed - 10/5/2023  1:29 PM        Passed - In person appointment or virtual visit in the past 12 mos or appointment in next 3 mos     Recent Outpatient Visits              5 months ago Seborrheic keratoses    Valeria Wade MD    Office Visit    6 months ago [facility], [address], [place] Lev Rolle MD    Office Visit    8 months ago Diarrhea, unspecified type    [facility], [facility], Gege Space., APRN    Office Visit    8 months ago Pain of left hand    [facility], [address], [place] Deyvi Metz MD    Office Visit    9 months ago Meningioma, cerebral ([address]    Cleopatra Holley MD    Office Visit          Future Appointments         Provider Department Appt Notes    In 3 weeks Lev Rolle MD [address], [address], [place] Last px 10/24/2022                       Future Appointments         Provider Department Appt Notes    In 3 weeks Lev Rolle MD [address], [address], [place] Last px 10/24/2022          Recent Outpatient Visits              5 months ago Seborrheic keratoses    Addi Wademhleonor Trejo MD    Office Visit    6 months ago Ataxia    [facility], [facility], Kendra Duncan, MD Ashlee    Office Visit    8 months ago Diarrhea, unspecified type    [address] [facility], [place] Sas, [place] [facility]., APRN    Office Visit    8 months ago Pain of left hand    [facility], [address], [place] Sriram Tamez MD    Office Visit    9 months ago Meningioma, cerebral ([facility]    Dina Black MD    Office Visit

## 2023-10-25 ENCOUNTER — TELEPHONE (OUTPATIENT)
Dept: RADIATION ONCOLOGY | Facility: HOSPITAL | Age: 88
End: 2023-10-25

## 2023-10-30 ENCOUNTER — LAB ENCOUNTER (OUTPATIENT)
Dept: LAB | Age: 88
End: 2023-10-30
Attending: INTERNAL MEDICINE
Payer: MEDICARE

## 2023-10-30 ENCOUNTER — OFFICE VISIT (OUTPATIENT)
Dept: INTERNAL MEDICINE CLINIC | Facility: CLINIC | Age: 88
End: 2023-10-30

## 2023-10-30 VITALS
SYSTOLIC BLOOD PRESSURE: 114 MMHG | HEIGHT: 63 IN | DIASTOLIC BLOOD PRESSURE: 71 MMHG | HEART RATE: 94 BPM | BODY MASS INDEX: 25.52 KG/M2 | OXYGEN SATURATION: 97 % | WEIGHT: 144 LBS

## 2023-10-30 DIAGNOSIS — R43.2 TASTE PERVERSION: ICD-10-CM

## 2023-10-30 DIAGNOSIS — R42 DIZZINESS: ICD-10-CM

## 2023-10-30 DIAGNOSIS — E55.9 VITAMIN D DEFICIENCY: ICD-10-CM

## 2023-10-30 DIAGNOSIS — D32.0 MENINGIOMA, CEREBRAL (HCC): ICD-10-CM

## 2023-10-30 DIAGNOSIS — Z00.00 MEDICARE ANNUAL WELLNESS VISIT, SUBSEQUENT: Primary | ICD-10-CM

## 2023-10-30 DIAGNOSIS — R07.89 OTHER CHEST PAIN: ICD-10-CM

## 2023-10-30 LAB
ALBUMIN SERPL-MCNC: 3.9 G/DL (ref 3.4–5)
ALBUMIN/GLOB SERPL: 1.1 {RATIO} (ref 1–2)
ALP LIVER SERPL-CCNC: 64 U/L
ALT SERPL-CCNC: 13 U/L
ANION GAP SERPL CALC-SCNC: 3 MMOL/L (ref 0–18)
AST SERPL-CCNC: 16 U/L (ref 15–37)
BASOPHILS # BLD AUTO: 0.06 X10(3) UL (ref 0–0.2)
BASOPHILS NFR BLD AUTO: 0.9 %
BILIRUB SERPL-MCNC: 0.7 MG/DL (ref 0.1–2)
BUN BLD-MCNC: 15 MG/DL (ref 7–18)
BUN/CREAT SERPL: 15.3 (ref 10–20)
CALCIUM BLD-MCNC: 9.5 MG/DL (ref 8.5–10.1)
CHLORIDE SERPL-SCNC: 109 MMOL/L (ref 98–112)
CO2 SERPL-SCNC: 28 MMOL/L (ref 21–32)
CREAT BLD-MCNC: 0.98 MG/DL
DEPRECATED RDW RBC AUTO: 40.4 FL (ref 35.1–46.3)
EGFRCR SERPLBLD CKD-EPI 2021: 56 ML/MIN/1.73M2 (ref 60–?)
EOSINOPHIL # BLD AUTO: 0.34 X10(3) UL (ref 0–0.7)
EOSINOPHIL NFR BLD AUTO: 4.9 %
ERYTHROCYTE [DISTWIDTH] IN BLOOD BY AUTOMATED COUNT: 12.4 % (ref 11–15)
FASTING STATUS PATIENT QL REPORTED: NO
GLOBULIN PLAS-MCNC: 3.7 G/DL (ref 2.8–4.4)
GLUCOSE BLD-MCNC: 98 MG/DL (ref 70–99)
HCT VFR BLD AUTO: 42.6 %
HGB BLD-MCNC: 14.1 G/DL
IMM GRANULOCYTES # BLD AUTO: 0.02 X10(3) UL (ref 0–1)
IMM GRANULOCYTES NFR BLD: 0.3 %
LYMPHOCYTES # BLD AUTO: 2.15 X10(3) UL (ref 1–4)
LYMPHOCYTES NFR BLD AUTO: 30.9 %
MCH RBC QN AUTO: 29.3 PG (ref 26–34)
MCHC RBC AUTO-ENTMCNC: 33.1 G/DL (ref 31–37)
MCV RBC AUTO: 88.6 FL
MONOCYTES # BLD AUTO: 0.54 X10(3) UL (ref 0.1–1)
MONOCYTES NFR BLD AUTO: 7.8 %
NEUTROPHILS # BLD AUTO: 3.84 X10 (3) UL (ref 1.5–7.7)
NEUTROPHILS # BLD AUTO: 3.84 X10(3) UL (ref 1.5–7.7)
NEUTROPHILS NFR BLD AUTO: 55.2 %
OSMOLALITY SERPL CALC.SUM OF ELEC: 291 MOSM/KG (ref 275–295)
PLATELET # BLD AUTO: 386 10(3)UL (ref 150–450)
POTASSIUM SERPL-SCNC: 4 MMOL/L (ref 3.5–5.1)
PROT SERPL-MCNC: 7.6 G/DL (ref 6.4–8.2)
RBC # BLD AUTO: 4.81 X10(6)UL
SODIUM SERPL-SCNC: 140 MMOL/L (ref 136–145)
VIT B12 SERPL-MCNC: 378 PG/ML (ref 193–986)
VIT D+METAB SERPL-MCNC: 26.2 NG/ML (ref 30–100)
WBC # BLD AUTO: 7 X10(3) UL (ref 4–11)

## 2023-10-30 PROCEDURE — 82306 VITAMIN D 25 HYDROXY: CPT

## 2023-10-30 PROCEDURE — 82607 VITAMIN B-12: CPT

## 2023-10-30 PROCEDURE — 80053 COMPREHEN METABOLIC PANEL: CPT

## 2023-10-30 PROCEDURE — 85025 COMPLETE CBC W/AUTO DIFF WBC: CPT

## 2023-10-30 PROCEDURE — 1125F AMNT PAIN NOTED PAIN PRSNT: CPT | Performed by: INTERNAL MEDICINE

## 2023-10-30 PROCEDURE — 36415 COLL VENOUS BLD VENIPUNCTURE: CPT

## 2023-10-30 PROCEDURE — G0439 PPPS, SUBSEQ VISIT: HCPCS | Performed by: INTERNAL MEDICINE

## 2023-11-01 ENCOUNTER — TELEPHONE (OUTPATIENT)
Dept: INTERNAL MEDICINE CLINIC | Facility: CLINIC | Age: 88
End: 2023-11-01

## 2023-11-01 NOTE — TELEPHONE ENCOUNTER
Patient calling to state cannot get in to see Dr. Wilson until January, asking if Dr. Cutler can recommend someone available sooner in [place].

## 2023-11-01 NOTE — TELEPHONE ENCOUNTER
Pt stated she forgot to mention it was for results   Spoke with patient (identified name and ), results reviewed and agrees with plan.    Pt concern can't see Dr Wilson until January do not want to wait that long

## 2023-11-03 NOTE — TELEPHONE ENCOUNTER
Can you see  this pt sooner than January?for dizziness  she does not  drive further  than [place]

## 2023-11-04 ENCOUNTER — TELEPHONE (OUTPATIENT)
Dept: INTERNAL MEDICINE CLINIC | Facility: CLINIC | Age: 88
End: 2023-11-04

## 2023-11-04 NOTE — TELEPHONE ENCOUNTER
Dr Cutler, I am leaving in Dec and wont be here after that, I have only few clinic days remaining and those are all completely booked.     I think so its better if she gets established with my other Neurology colleagues as I am not going to be here after Dec.

## 2023-11-06 ENCOUNTER — APPOINTMENT (OUTPATIENT)
Dept: GENERAL RADIOLOGY | Facility: HOSPITAL | Age: 88
End: 2023-11-06
Payer: MEDICARE

## 2023-11-06 ENCOUNTER — HOSPITAL ENCOUNTER (OUTPATIENT)
Age: 88
Discharge: ANOTHER HOSPITAL | End: 2023-11-06
Payer: MEDICARE

## 2023-11-06 ENCOUNTER — HOSPITAL ENCOUNTER (EMERGENCY)
Facility: HOSPITAL | Age: 88
Discharge: HOME | End: 2023-11-06
Attending: EMERGENCY MEDICINE
Payer: MEDICARE

## 2023-11-06 VITALS
DIASTOLIC BLOOD PRESSURE: 85 MMHG | RESPIRATION RATE: 20 BRPM | WEIGHT: 140 LBS | OXYGEN SATURATION: 96 % | SYSTOLIC BLOOD PRESSURE: 146 MMHG | TEMPERATURE: 98 F | HEIGHT: 63 IN | HEART RATE: 93 BPM | BODY MASS INDEX: 24.8 KG/M2

## 2023-11-06 VITALS
HEART RATE: 97 BPM | OXYGEN SATURATION: 96 % | DIASTOLIC BLOOD PRESSURE: 72 MMHG | TEMPERATURE: 98 F | RESPIRATION RATE: 16 BRPM | SYSTOLIC BLOOD PRESSURE: 144 MMHG

## 2023-11-06 DIAGNOSIS — R10.11 RUQ PAIN: Primary | ICD-10-CM

## 2023-11-06 DIAGNOSIS — S20.211A CONTUSION OF RIB ON RIGHT SIDE, INITIAL ENCOUNTER: Primary | ICD-10-CM

## 2023-11-06 DIAGNOSIS — T14.90XA TRAUMA: ICD-10-CM

## 2023-11-06 PROCEDURE — 99283 EMERGENCY DEPT VISIT LOW MDM: CPT

## 2023-11-06 PROCEDURE — 99214 OFFICE O/P EST MOD 30 MIN: CPT

## 2023-11-06 PROCEDURE — 99284 EMERGENCY DEPT VISIT MOD MDM: CPT

## 2023-11-06 PROCEDURE — 71111 X-RAY EXAM RIBS/CHEST4/> VWS: CPT

## 2023-11-06 NOTE — DISCHARGE INSTRUCTIONS
Return if you get worsening pain or new symptoms or change your mind about the CAT scan. Ibuprofen with food every 8 hours as needed for pain. Ice the affected area. Use the incentive spirometer to take deep breaths a few times a day. The x-ray did not see any fracture but there can always be hidden injuries. Read all instructions. Follow-up with your primary in 2 days for reevaluation.

## 2023-11-06 NOTE — ED INITIAL ASSESSMENT (HPI)
Pt states a couple of days ago she was trying to change the light bulb and fell and her chest and abdomen hit the sink. Pt has chest  and right upper abd/rib pain.

## 2023-11-10 ENCOUNTER — PATIENT OUTREACH (OUTPATIENT)
Dept: CASE MANAGEMENT | Age: 88
End: 2023-11-10

## 2023-11-10 ENCOUNTER — OFFICE VISIT (OUTPATIENT)
Dept: NEUROLOGY | Facility: CLINIC | Age: 88
End: 2023-11-10
Payer: MEDICARE

## 2023-11-10 ENCOUNTER — OFFICE VISIT (OUTPATIENT)
Dept: DERMATOLOGY CLINIC | Facility: CLINIC | Age: 88
End: 2023-11-10

## 2023-11-10 VITALS — WEIGHT: 145 LBS | BODY MASS INDEX: 25.69 KG/M2 | HEIGHT: 63 IN

## 2023-11-10 DIAGNOSIS — L30.4 INTERTRIGO: ICD-10-CM

## 2023-11-10 DIAGNOSIS — D23.4 BENIGN NEOPLASM OF SCALP AND SKIN OF NECK: ICD-10-CM

## 2023-11-10 DIAGNOSIS — Z85.828 HISTORY OF NONMELANOMA SKIN CANCER: ICD-10-CM

## 2023-11-10 DIAGNOSIS — L82.1 SEBORRHEIC KERATOSES: ICD-10-CM

## 2023-11-10 DIAGNOSIS — D23.30 BENIGN NEOPLASM OF SKIN OF FACE: ICD-10-CM

## 2023-11-10 DIAGNOSIS — R27.0 ATAXIA: ICD-10-CM

## 2023-11-10 DIAGNOSIS — D22.9 MULTIPLE NEVI: ICD-10-CM

## 2023-11-10 DIAGNOSIS — D48.5 NEOPLASM OF UNCERTAIN BEHAVIOR OF SKIN: Primary | ICD-10-CM

## 2023-11-10 DIAGNOSIS — D23.70 BENIGN NEOPLASM OF SKIN OF LOWER LIMB, INCLUDING HIP, UNSPECIFIED LATERALITY: ICD-10-CM

## 2023-11-10 DIAGNOSIS — R42 DIZZINESS: Primary | ICD-10-CM

## 2023-11-10 DIAGNOSIS — L57.0 AK (ACTINIC KERATOSIS): ICD-10-CM

## 2023-11-10 DIAGNOSIS — D23.60 BENIGN NEOPLASM OF SKIN OF UPPER LIMB, INCLUDING SHOULDER, UNSPECIFIED LATERALITY: ICD-10-CM

## 2023-11-10 DIAGNOSIS — D23.5 BENIGN NEOPLASM OF SKIN OF TRUNK: ICD-10-CM

## 2023-11-10 PROCEDURE — 88305 TISSUE EXAM BY PATHOLOGIST: CPT | Performed by: DERMATOLOGY

## 2023-11-10 PROCEDURE — 99204 OFFICE O/P NEW MOD 45 MIN: CPT | Performed by: OTHER

## 2023-11-10 RX ORDER — KETOCONAZOLE 20 MG/G
1 CREAM TOPICAL 2 TIMES DAILY
Qty: 30 G | Refills: 3 | Status: SHIPPED | OUTPATIENT
Start: 2023-11-10

## 2023-11-10 NOTE — PROGRESS NOTES
Neurology Initial Visit     Referred By: Dr. Lazaro ref. provider found    Chief Complaint:   Chief Complaint   Patient presents with    Dizziness     New patient presents with dizziness. patient also states she has a tumor on her brain. Patient states that her dizziness started about a year ago. Patient states that her PCP gave her meclizine but it doesn't work. HPI:     Arash Espinal is a 80year old female, who presents for balance problem. At time she was referring to dizziness, but it seems that she was referring primarily to the balance follow-up, feeling of unsteadiness. She was trying to change the battery of her sink in the kitchen, she climbed up, however she fell and hit her chest over the sink. Since then she has had pain over the ribs cage. She went to the emergency room. X-ray of the chest was done, no fractures. CT of the head also was done that was normal acute changes. 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 However she is following with neurosurgeon and no intervention is indicated considering her age and risks involved. Patient also has some urinary control, that relates to the prolapse. Tonia Whipple      Past Medical History:   Diagnosis Date    Brain tumor ([address]     Hyperlipidemia     Meningioma ([address]     Skin cancer     left hand       Past Surgical History:   Procedure Laterality Date    APPENDECTOMY      CYST ASPIRATION LEFT      breast    HYSTERECTOMY      TVH due to prolapse    KNEE SCOPE,DIAGNOSTIC      right knee    OTHER SURGICAL HISTORY Left 2013    Excision of skin cancer with grafting       Social history:  History   Smoking Status    Never   Smokeless Tobacco    Never     History   Alcohol Use Not Currently     Comment: 1 beer per month     History   Drug Use No       Family History   Problem Relation Age of Onset    Diabetes Mother          Current Outpatient Medications:     LORazepam 0.5 MG Oral Tab, Take 1 tablet (0.5 mg total) by mouth nightly., Disp: 90 tablet, Rfl: 0    meclizine 25 MG Oral Tab, Take 1 tab by mouth twice a day as needed, Disp: 60 tablet, Rfl: 5    ergocalciferol 1.25 MG (94077 UT) Oral Cap, Take 1 capsule (50,000 Units total) by mouth once a week., Disp: 12 capsule, Rfl: 1    Allergies   Allergen Reactions    Penicillins HALLUCINATION    Lyrica [Pregabalin] RASH    Bactrim Ds RASH    Norco [Hydrocodone-Acetaminophen] RASH     1/16/23 Currently taking Norco prn without any side effects       ROS:   As in HPI, the rest of the 14 system review was done and was negative      Physical Exam:  Vitals:    11/10/23 0825   Weight: 145 lb (65.8 kg)   Height: 63\"       General: No apparent distress, well nourished, well groomed. Head- Normocephalic, atraumatic  Eyes- No redness or swelling  ENT- Hearing intake, normal glutition  Neck- No masses or adenopathy  Cv: pulses were palpable and normal, no cyanosis or edema     Neurological:     Mental Status- Alert and oriented x3. Normal attention span and concentration  Thought process intact  Memory intact- recent and remote  Mood intact  Fund of knowledge appropriate for education and age    Language intact including: comprehension, naming, repetition, vocabulary    Cranial Nerves:  V. Facial sensation intact  VII. Face symmetric, no facial weakness  VIII. Hearing intact to whisper. IX. Pallet elevates symmetrically. XI. Shoulder shrug is intact  XII.  Tongue is midline    Motor Exam:  Muscle tone normal  No atrophy or fasciculations  Strength- upper extremities 5/5 proximally and distally                  - lower  extremities 5/5 proximally and distally    Sensory Exam:  Light touch sensation- intact in all 4 extremities    Deep Tendon Reflexes:  Biceps 2+ bilateral symmetric  Triceps 2+ bilateral symmetric  Brachioradialis 2 + bilateral symmetric  Patellar 2+ bilateral symmetric  Ankle jerk 2+ bilateral symmetric    No clonus  No Babinski sign    Coordination:  Finger to nose intact  Rapid alternating movements intact    Gait:  Normal posture, quite unsteady gait. Labs:    Lab Results   Component Value Date    TSH 2.490 12/10/2022     No results found for: \"CHOL\", \"HDL\", \"LDL\", \"TRIG\"  Lab Results   Component Value Date    HGB 14.1 10/30/2023    HCT 42.6 10/30/2023    MCV 88.6 10/30/2023    WBC 7.0 10/30/2023    .0 10/30/2023      Lab Results   Component Value Date    BUN 15 10/30/2023    CA 9.5 10/30/2023    ALT 13 10/30/2023    AST 16 10/30/2023    ALB 3.9 10/30/2023     10/30/2023    K 4.0 10/30/2023     10/30/2023    CO2 28.0 10/30/2023      I have reviewed labs. Imaging Studies:  I have independently reviewed imaging. MRI of the brain was independent reviewed as above. Mass effect on the medulla and taye from the right side. Assessment   1. Dizziness  Most likely patient is referring primarily to ataxia and not so much to any vertiginous feeling. Therefore physical therapy for the balance will be ordered. Patient was advised to use the walker at all times and no more climbing above the hiatus to change batteries in small detectors. 2. Ataxia  Additional blood will be done to look for any other treatable causes of ataxia. Quite possibly partly her ataxia is explained by the mass effect on the taye and medulla. However it is unlikely that she is a candidate for any intervention. We can consider MRI cervical spine but unlikely to be a candidate even if we find compression.  - Lyme Disease, Total Ab W Rflx; Future  - TSH W Reflex To Free T4; Future  - Vitamin B6  - Vitamin E, Serum  - T Pallidum Screening Cascade; Future  - PRINCESS-65 Autoantibody; Future  - MRI SPINE CERVICAL (CPT=72141); Future  - Physical Therapy Referral - [facility] [place]           Education and counseling provided to patient. Instructed patient to call my office or seek medical attention immediately if symptoms worsen.   Patient verbalized understanding of information given. All questions were answered. All side effects of drugs were discussed. Return to clinic in: Return if symptoms worsen or fail to improve.     Diamond Conway MD

## 2023-11-14 NOTE — PROGRESS NOTES
The pathology report from last visit showed   A. Skin, right nasal dorsum, shave biopsy:  -Actinic keratosis. B.  Skin, right ala, shave biopsy:  -Actinic keratosis. No surgery. Re-check in 3-4 mos  . Please log in test results. Please call patient and inform of results and recommendations.  (please add to history). Pt to  rtc 3-4 mos or prn.

## 2023-11-17 ENCOUNTER — TELEPHONE (OUTPATIENT)
Dept: INTERNAL MEDICINE CLINIC | Facility: CLINIC | Age: 88
End: 2023-11-17

## 2023-11-17 NOTE — TELEPHONE ENCOUNTER
Condition update: Patient saw Dr. Tonja Tijerina and states \"he didn't do anything\" RN reviewed that vestibular therapy was ordered. Patient states \"that wont do anything for me. \" Patient states dizziness is severe. She is requesting medication for dizziness but reports meclizine was not helpful at 25mg, patient would like to know if there is a higher dose or alternative medication? Patient also needs ER follow up, okay to use res24 next week? Verified preferred pharmacy and patient allergies.

## 2023-11-18 NOTE — TELEPHONE ENCOUNTER
Spoke to patient, advised that there is no medication to help with the dizziness and ataxia, running more test will help to find what else can be done if it is related to her spine, she complains of thoracic burning pain, advised that she can consider doing MRI of the thoracic spine additionally to MRI of the cervical spine.   Order placed, I advised that we can try short course of prednisone taper if it is a pinched nerve she will feel better, prescription sent to the pharmacy she agreed to the plan

## 2023-11-23 NOTE — PROGRESS NOTES
Operative Report                     Shave/  Tangential biopsy     Clinical diagnosis:    Size of lesion:    Location:Patient with history of AK's treated previously with cryo with scaling crusted area remaining at right nasal dorsum, new papule at ala A. Right nasal dorsum rule out SCC erythematous patch more keratotic area 5mm B. Right ala 0.3 cm pearly papule rule out BCC,     Procedure: With patient in appropriate position the skin of the above was scrubbed with alcohol. Anesthesia was obtained with 1% Xylocaine with epinephrine. The skin surrounding the lesion was placed under tension and the lesion was incised using a #15 scalpel blade. The specimen was sent for histopathologic exam.    Hemostasis was obtained with electrocautery/aluminum chloride. Estimated blood loss less than 2 cc. Biopsy dressed with Polysporin, bandage. Pressure dressing:   No    Complications: None    Written instructions given and reviewed with patient    Await pathology    Contact information reviewed.     Procedural physician:  Gregoria Kahn MD

## 2023-11-23 NOTE — PROGRESS NOTES
Miguel Chan is a 80year old female. HPI:     CC:    Chief Complaint   Patient presents with    Derm Problem     LOV 4/19/23. Pt presents with growth on the nose has been treated with cryo before, has grown back. Pt also concern with a small burning rash under her left breast x 2-3 weeks. Allergies:  Penicillins, Lyrica [pregabalin], Bactrim ds, and Norco [hydrocodone-acetaminophen]    HISTORY:    Past Medical History:   Diagnosis Date    Actinic keratosis 2023    right ala    Actinic keratosis 2023    right nasal dorsum    Brain tumor ([address]     Hyperlipidemia     Meningioma (HCC)     Skin cancer     left hand      Past Surgical History:   Procedure Laterality Date    APPENDECTOMY      CYST ASPIRATION LEFT      breast    HYSTERECTOMY      TVH due to prolapse    KNEE SCOPE,DIAGNOSTIC      right knee    OTHER SURGICAL HISTORY Left 2013    Excision of skin cancer with grafting      Family History   Problem Relation Age of Onset    Diabetes Mother       Social History     Socioeconomic History    Marital status:    Tobacco Use    Smoking status: Never     Passive exposure: Never    Smokeless tobacco: Never   Vaping Use    Vaping Use: Never used   Substance and Sexual Activity    Alcohol use: Not Currently     Comment: 1 beer per month    Drug use: No   Other Topics Concern    Caffeine Concern No     Comment: 2 cups coffee daily    Exercise Yes     Comment: walking    Breast feeding No    Reaction to local anesthetic No    Pt has a pacemaker No    Pt has a defibrillator No   Social History Narrative    The patient does not use an assistive device. .      The patient does live in a home with stairs. Current Outpatient Medications   Medication Sig Dispense Refill    ketoconazole 2 % External Cream Apply 1 Application topically 2 (two) times daily. Apply to affected area 2 times daily. 30 g 3    LORazepam 0.5 MG Oral Tab Take 1 tablet (0.5 mg total) by mouth nightly.  90 tablet 0    meclizine 25 MG Oral Tab Take 1 tab by mouth twice a day as needed 60 tablet 5    ergocalciferol 1.25 MG (57391 UT) Oral Cap Take 1 capsule (50,000 Units total) by mouth once a week. 12 capsule 1    predniSONE 10 MG Oral Tab Take 30 mg PO daily x 2 days, Take 20 mg PO daily x 2 days, Take 10 mg PO daily x 2 days 12 tablet 0     Allergies:    Allergies   Allergen Reactions    Penicillins HALLUCINATION    Lyrica [Pregabalin] RASH    Bactrim Ds RASH    Norco [Hydrocodone-Acetaminophen] RASH     1/16/23 Currently taking Norco prn without any side effects       Past Medical History:   Diagnosis Date    Actinic keratosis 2023    right ala    Actinic keratosis 2023    right nasal dorsum    Brain tumor ([address]     Hyperlipidemia     Meningioma (HCC)     Skin cancer     left hand     Past Surgical History:   Procedure Laterality Date    APPENDECTOMY      CYST ASPIRATION LEFT      breast    HYSTERECTOMY      TVH due to prolapse    KNEE SCOPE,DIAGNOSTIC      right knee    OTHER SURGICAL HISTORY Left 2013    Excision of skin cancer with grafting     Social History     Socioeconomic History    Marital status:      Spouse name: Not on file    Number of children: Not on file    Years of education: Not on file    Highest education level: Not on file   Occupational History    Not on file   Tobacco Use    Smoking status: Never     Passive exposure: Never    Smokeless tobacco: Never   Vaping Use    Vaping Use: Never used   Substance and Sexual Activity    Alcohol use: Not Currently     Comment: 1 beer per month    Drug use: No    Sexual activity: Not on file   Other Topics Concern     Service Not Asked    Blood Transfusions Not Asked    Caffeine Concern No     Comment: 2 cups coffee daily    Occupational Exposure Not Asked    Hobby Hazards Not Asked    Sleep Concern Not Asked    Stress Concern Not Asked    Weight Concern Not Asked    Special Diet Not Asked    Back Care Not Asked    Exercise Yes     Comment: walking    Bike Helmet Not Asked    Seat Belt Not Asked    Self-Exams Not Asked    Grew up on a farm Not Asked    History of tanning Not Asked    Outdoor occupation Not Asked    Breast feeding No    Reaction to local anesthetic No    Pt has a pacemaker No    Pt has a defibrillator No   Social History Narrative    The patient does not use an assistive device. .      The patient does live in a home with stairs. Social Determinants of Health     Financial Resource Strain: Not on file   Food Insecurity: Not on file   Transportation Needs: Not on file   Physical Activity: Not on file   Stress: Not on file   Social Connections: Not on file   Housing Stability: Not on file     Family History   Problem Relation Age of Onset    Diabetes Mother        There were no vitals filed for this visit. HPI:  Chief Complaint   Patient presents with    Derm Problem     LOV 4/19/23. Pt presents with growth on the nose has been treated with cryo before, has grown back. Pt also concern with a small burning rash under her left breast x 2-3 weeks. History of AK's notes irritation at left Chetna's finger. Comes and goes discolored post surgery    Follow-up concerned with left index finger, nose. History of cryo previously  For skin check  Patient presents with concerns above. Patient has been in their usual state of health. History, medications, allergies reviewed as noted. ROS:  Denies any other systemic complaints. No new or changeing lesions other than noted above. No fevers, chills, night sweats, unusual sun sensitivity. No other skin complaints. History, medications, allergies reviewed as noted. Physical Examination:     Well-developed well-nourished patient alert oriented in no acute distress. Exam performed, including scalp, head, neck, face,nails, hair, external eyes, including conjunctival mucosa, eyelids, lips external ears , arms, digits,palms.      Multiple light to medium brown, well marginated, uniformly pigmented, macules and papules 6 mm and less scattered on exam. pigmented lesions examined with dermoscopy benign-appearing patterns. Waxy tannish keratotic papules scattered, cherry-red vascular papules scattered. See map today's date for lesions noted . See assessment and plan below for specific lesions. Otherwise remarkable for lesions as noted on map. Assessment / plan:    Orders Placed This Encounter   Procedures    Specimen to Pathology, Tissue [IHP Pt to [address] [place] [address] for this Visit:  Requested Prescriptions     Signed Prescriptions Disp Refills    ketoconazole 2 % External Cream 30 g 3     Sig: Apply 1 Application topically 2 (two) times daily. Apply to affected area 2 times daily. Encounter Diagnoses   Name Primary? Neoplasm of uncertain behavior of skin Yes    Seborrheic keratoses     Multiple nevi     AK (actinic keratosis)     Benign neoplasm of scalp and skin of neck     Benign neoplasm of skin of face     Benign neoplasm of skin of upper limb, including shoulder, unspecified laterality     Benign neoplasm of skin of trunk     Benign neoplasm of skin of lower limb, including hip, unspecified laterality     History of nonmelanoma skin cancer     Intertrigo      Patient with history of AK's treated previously. s   with cryo with scaling crusted area remaining at right nasal dorsum, new papule at ala A. Right nasal dorsum rule out SCC erythematous patch more keratotic area 5mm B. Right ala 0.3 cm pearly papule rule out BCC,     AK's improved. Continue monitoring  Actinic Keratoses. Precancerous nature discussed. Sun protection, sunscreen/ blocks encouraged . Monitoring for new lesions. Sun damage additional recurrent and new actinic keratoses, skin cancers may occur in areas of prior actinic keratoses, related to past sun exposure to minimize current sun exposure.   Sunscreen applied consistently regularly, reapplication and sun protection while driving recommended. Numerous waxy tan papules including over the forehead reassurance    Intertrigo inframammary creases ketoconazole daily twice daily as needed. Local care, Zeasorb or Goldbond powder consider adding nystatin powder  Pathophysiology discussed with patient. Therapeutic options reviewed. See  Medications in grid. Instructions reviewed at length. Labs reviewedGlucose normal    History of SCC no recurrence. Healed well. Continue monitoring left hand    Multiple waxy tan papules over the left supra brow, forehead consistent with benign seborrheic keratoses reassurance   Benign nature discussed. Possibility of cryo, alphahydroxy acids over-the-counter retinol's discussed. Keratotic papule posterior neck observe multiple keratoses over the chest arms back in particular observe more keratotic lesions over the forearms,    Remote history of skin cancer nose had this \"burned off\". Had regular follow-up previously   Patient with history of skin cancer. No evidence of recurrence. No new skin cancer. Regular follow-ups suggested. Plan follow-up spring, early summer    No other susupicious lesions on todays  exam.    Please refer to map for specific lesions. See additional diagnoses. Pros cons of various therapies, risks benefits discussed. Pathophysiology discussed with patient. Therapeutic options reviewed. See  Medications in grid. Instructions reviewed at length. Benign nevi, seborrheic  keratoses, cherry angiomas:  Reassurance regarding other benign skin lesions. Signs and symptoms of skin cancer, ABCDE's of melanoma discussed with patient. Sunscreen use, sun protection, self exams reviewed. Followup as noted RTC ---routine checkup    6 mos -one year or p.r.n.     Encounter Times  Including precharting, reviewing chart, prior notes obtaining history: 5 minutes, medical exam :10 minutes, notes on body map, plan, counseling 10minutes My total time spent caring for the patient on the day of the encounter: 25 minutes     The patient indicates understanding of these issues and agrees to the plan. The patient is asked to return as noted in follow-up/ above. This note was generated using Dragon voice recognition software. Please contact me regarding any confusion resulting from errors in recognition. Roxana Ambriz

## 2023-12-11 ENCOUNTER — TELEPHONE (OUTPATIENT)
Dept: INTERNAL MEDICINE CLINIC | Facility: CLINIC | Age: 88
End: 2023-12-11

## 2023-12-11 ENCOUNTER — HOSPITAL ENCOUNTER (OUTPATIENT)
Dept: MRI IMAGING | Age: 88
Discharge: HOME | End: 2023-12-11
Attending: RADIOLOGY
Payer: MEDICARE

## 2023-12-11 DIAGNOSIS — D32.0 MENINGIOMA, CEREBRAL (HCC): ICD-10-CM

## 2023-12-11 PROCEDURE — A9575 INJ GADOTERATE MEGLUMI 0.1ML: HCPCS | Performed by: RADIOLOGY

## 2023-12-11 PROCEDURE — 70553 MRI BRAIN STEM W/O & W/DYE: CPT | Performed by: RADIOLOGY

## 2023-12-11 RX ORDER — GADOTERATE MEGLUMINE 376.9 MG/ML
15 INJECTION INTRAVENOUS
Status: COMPLETED | OUTPATIENT
Start: 2023-12-11 | End: 2023-12-11

## 2023-12-11 RX ADMIN — GADOTERATE MEGLUMINE 14 ML: 376.9 INJECTION INTRAVENOUS at 13:34:00

## 2023-12-27 ENCOUNTER — HOSPITAL ENCOUNTER (OUTPATIENT)
Dept: MRI IMAGING | Age: 88
Discharge: HOME | End: 2023-12-27
Attending: Other
Payer: MEDICARE

## 2023-12-27 ENCOUNTER — HOSPITAL ENCOUNTER (OUTPATIENT)
Dept: MRI IMAGING | Age: 88
Discharge: HOME | End: 2023-12-27
Attending: INTERNAL MEDICINE
Payer: MEDICARE

## 2023-12-27 DIAGNOSIS — M54.6 CHRONIC MIDLINE THORACIC BACK PAIN: ICD-10-CM

## 2023-12-27 DIAGNOSIS — R27.0 ATAXIA: ICD-10-CM

## 2023-12-27 DIAGNOSIS — G89.29 CHRONIC MIDLINE THORACIC BACK PAIN: ICD-10-CM

## 2023-12-27 PROCEDURE — 72146 MRI CHEST SPINE W/O DYE: CPT | Performed by: INTERNAL MEDICINE

## 2023-12-27 PROCEDURE — 72141 MRI NECK SPINE W/O DYE: CPT | Performed by: OTHER

## 2023-12-28 ENCOUNTER — TELEPHONE (OUTPATIENT)
Dept: INTERNAL MEDICINE CLINIC | Facility: CLINIC | Age: 88
End: 2023-12-28

## 2023-12-28 ENCOUNTER — TELEPHONE (OUTPATIENT)
Dept: NEUROLOGY | Facility: CLINIC | Age: 88
End: 2023-12-28

## 2023-12-28 NOTE — TELEPHONE ENCOUNTER
----- Message from Cristofer Salcido MD sent at 12/28/2023  7:37 AM CST -----  Please let patient know that MRI cervical spine showed significant degenerative changes and bulging disks.  She can consider to follow-up with neurosurgeon again to discuss potential intervention, but unlikely that she would be a candidate.  Otherwise we can continue with physical therapy minimalization

## 2023-12-29 ENCOUNTER — OFFICE VISIT (OUTPATIENT)
Dept: RADIATION ONCOLOGY | Facility: HOSPITAL | Age: 88
End: 2023-12-29
Attending: RADIOLOGY
Payer: MEDICARE

## 2023-12-29 ENCOUNTER — TELEPHONE (OUTPATIENT)
Dept: NEUROLOGY | Facility: CLINIC | Age: 88
End: 2023-12-29

## 2023-12-29 VITALS
TEMPERATURE: 97 F | DIASTOLIC BLOOD PRESSURE: 59 MMHG | OXYGEN SATURATION: 96 % | WEIGHT: 142.19 LBS | SYSTOLIC BLOOD PRESSURE: 151 MMHG | HEART RATE: 84 BPM | RESPIRATION RATE: 16 BRPM | BODY MASS INDEX: 25 KG/M2

## 2023-12-29 DIAGNOSIS — D32.0 MENINGIOMA, CEREBRAL (HCC): Primary | ICD-10-CM

## 2023-12-29 PROCEDURE — 99211 OFF/OP EST MAY X REQ PHY/QHP: CPT

## 2023-12-29 NOTE — PROGRESS NOTES
[facility]    PATIENT'S NAME: Severiano Hurdle   RADIATION ONCOLOGIST: Timothy Al. Felipe Andujar MD   PATIENT ACCOUNT #: [de-identified] LOCATION: [facility] RECORD #: [id number] YOB: 1934   FOLLOW-UP DATE: 12/29/2023       RADIATION ONCOLOGY FOLLOW-UP NOTE    REFERRING PHYSICIAN:  Charmayne Favia, MD.    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 INTERVAL SINCE COMPLETION OF RADIATION THERAPY:  Three years. PATIENT STATUS:  Radiographically controlled. HISTORY:  The patient is an 80-year-old female who presents today for a routine followup visit. She has a history of a meningioma found in the right CP angle. She has been having some symptoms of insomnia and unsteadiness, and a CT scan of the brain showed a 2.9 cm ovoid extra-axial right CP angle lesion noted to efface the right lateral brainstem. A followup MRI showed this to be 2.9 cm right anterior CP angle dural-based extra-axial mass thought very likely to represent a meningioma. This did result in mass effect and leftward displacement of the taye and right brachium pontis. The patient was not a surgical candidate and was felt that this would be best treated with fractionated radiotherapy. I then treated her to 5040 cGy in 180 cGy fractions with a limited field, and she completed those treatments on 01/26/2021. On her visit today, the patient has had significant issues with dizziness over the last year or so. A specific etiology has not yet been found, though the patient is following with Neurology to determine this. Meclizine has apparently not helped her much, and the patient is rather despondent and having a difficult time with this. Her most recent MRI was on 12/11/2023. This reveals no significant change in the right CP angle meningioma.   However, when I compared this to the films from June 2021 after completing radiation, the mass is smaller to a meaningful degree. PHYSICAL EXAMINATION:    GENERAL:  On exam today, the patient is noted to have a blood pressure of 151/59, pulse of 84, respiratory rate of 16, and temperature 97.2 degrees. She has a pain score of 0 and a weight of 142 pounds. NECK:  Supple with no lymphadenopathy. LUNGS:  Clear to auscultation bilaterally. HEART:  Regular rate and rhythm. Normal S1, S2. No audible murmurs. LYMPHATICS:  There is no supraclavicular, axillary, inguinal lymphadenopathy. ABDOMEN:  Benign. IMPRESSION AND RECOMMENDATIONS:  Overall, the patient is doing reasonably well. Her imaging is excellent and continues to show some response in that area. Generally speaking, we expect a meningioma to cease growing after exposure to radiation, but we do not really see a great deal of reduction in size. In this case, however, the tumor has clearly reduced in size from when she was first irradiated 3 years ago. This is an encouraging sign and there certainly has been no evidence of progression at any level. However, the patient continues significant problems with dizziness. I suppose this certainly may be the result of the meningioma, but it seems curious that the mass is actually getting smaller and her symptoms started just 1 year ago or thereabouts. I therefore would defer to the expertise of Neurology as to determining the cause and whether or not any interventions or medications can be administered to help with her condition, as this is having a meaningful impact upon quality of life. Otherwise, I am very pleased with how well the patient has done. Given her advanced age and the fact that the lesion has been very stable and in fact shrunk over the last 3 years, I told her that she really does not need to keep coming back and seeing me for routine visits.   If there should ever be any question in the future, I would be happy to see her again and could get an MRI at that point, but I do not think we really need to order routine imaging anymore. The patient was pleased with this approach, and told me that she would contact us if she should have any questions at any point in the future regarding radiation. Thank you very much for allowing me the opportunity to participate in the care of this patient. If there should be any questions regarding the radiotherapy, please feel free to contact me at any time. Dictated By Anthony Maldonado MD  d: 12/29/2023 14:46:38  t: 12/29/2023 16:49:45  Job [id number]  NAD/    cc: MD Iván Fontanez MD

## 2023-12-29 NOTE — TELEPHONE ENCOUNTER
Called patient at the phone that is provided in the system to give her the results of her MRI. We left message for her to call back.

## 2023-12-29 NOTE — TELEPHONE ENCOUNTER
Reports ongoing risk dizzy spells, couple days ago while standing in the kitchen developed severe dizzy spell. Advised to continue cardiac reasons such as bradycardia, aortic stenosis etc. recommended to do event monitor and echocardiogram, provided patient with phone number to make appointment.   Advised that brain MRI seems stable, Sarha cervical spine showed significant degenerative changes, spinal stenosis and foraminal stenosis advised her to see what neurologist would recommend

## 2023-12-29 NOTE — PATIENT INSTRUCTIONS
Follow up with Dr. Brandon Kaur as needed. Call [phone] to reach a radiation nurse. Call Neurology for an appointment.

## 2023-12-29 NOTE — TELEPHONE ENCOUNTER
----- Message from Yonny Brown MD sent at 12/28/2023  7:37 AM CST -----  Please let patient know that MRI cervical spine showed significant degenerative changes and bulging disks. She can consider to follow-up with neurosurgeon again to discuss potential intervention, but unlikely that she would be a candidate.   Otherwise we can continue with physical therapy minimalization

## 2024-01-04 NOTE — LETTER
[address], [address]  [facility]: [phone]   Consent to Procedure/Sedation    Date: __10/8/2020_____    Time: ___8:34 AM ___    1.  I authorize the performance upon Artem Santa f Chronic, uncontrolled  Xrays previously negative per patient, records requested  Failed PT  Check MR  Referral to pain management  Trial gabapentin 100 mg 3 times daily   Signature of person authorized to consent for patient: Relationship to patient:  ___________________________    ___________________    Witness: ____________________     Date: ______________    Printed: 10/8/2020   8:34 AM    Patient Name: Wolfgang Foley

## 2024-01-30 ENCOUNTER — TELEPHONE (OUTPATIENT)
Dept: INTERNAL MEDICINE CLINIC | Facility: CLINIC | Age: 89
End: 2024-01-30

## 2024-01-30 NOTE — TELEPHONE ENCOUNTER
Patient asking for a call from ,    Patient states the dizziness worsening again and Meclizine does not do anything.She saw Dr.Das Motta 12/29/24 and has seen the neurologist 11/10/23    Patient states she is walking like a duck, no energy whatsoever. Would like a call from

## 2024-02-05 ENCOUNTER — TELEPHONE (OUTPATIENT)
Dept: INTERNAL MEDICINE CLINIC | Facility: CLINIC | Age: 89
End: 2024-02-05

## 2024-02-09 ENCOUNTER — TELEPHONE (OUTPATIENT)
Dept: INTERNAL MEDICINE CLINIC | Facility: CLINIC | Age: 89
End: 2024-02-09

## 2024-02-18 NOTE — TELEPHONE ENCOUNTER
Spoke to patient few days ago reviewed my recommendations she started taking Paxil will follow-up in 1 month

## 2024-03-02 DIAGNOSIS — F41.9 ANXIETY: ICD-10-CM

## 2024-03-02 RX ORDER — LORAZEPAM 0.5 MG/1
0.5 TABLET ORAL NIGHTLY
Qty: 90 TABLET | Refills: 0 | OUTPATIENT
Start: 2024-03-02

## 2024-03-18 ENCOUNTER — LAB ENCOUNTER (OUTPATIENT)
Dept: LAB | Age: 89
End: 2024-03-18
Attending: INTERNAL MEDICINE
Payer: MEDICARE

## 2024-03-18 ENCOUNTER — OFFICE VISIT (OUTPATIENT)
Dept: INTERNAL MEDICINE CLINIC | Facility: CLINIC | Age: 89
End: 2024-03-18

## 2024-03-18 VITALS
BODY MASS INDEX: 25.16 KG/M2 | HEART RATE: 83 BPM | SYSTOLIC BLOOD PRESSURE: 150 MMHG | DIASTOLIC BLOOD PRESSURE: 80 MMHG | HEIGHT: 63 IN | RESPIRATION RATE: 16 BRPM | WEIGHT: 142 LBS

## 2024-03-18 DIAGNOSIS — R53.83 OTHER FATIGUE: ICD-10-CM

## 2024-03-18 DIAGNOSIS — R42 DIZZINESS: ICD-10-CM

## 2024-03-18 DIAGNOSIS — R42 DIZZINESS: Primary | ICD-10-CM

## 2024-03-18 LAB
ALBUMIN SERPL-MCNC: 4.4 G/DL (ref 3.2–4.8)
ALBUMIN/GLOB SERPL: 1.5 {RATIO} (ref 1–2)
ALP LIVER SERPL-CCNC: 66 U/L
ALT SERPL-CCNC: <7 U/L
ANION GAP SERPL CALC-SCNC: 6 MMOL/L (ref 0–18)
AST SERPL-CCNC: 16 U/L (ref ?–34)
BASOPHILS # BLD AUTO: 0.04 X10(3) UL (ref 0–0.2)
BASOPHILS NFR BLD AUTO: 0.5 %
BILIRUB SERPL-MCNC: 0.7 MG/DL (ref 0.2–1.1)
BUN BLD-MCNC: 13 MG/DL (ref 9–23)
BUN/CREAT SERPL: 15.5 (ref 10–20)
CALCIUM BLD-MCNC: 9.8 MG/DL (ref 8.7–10.4)
CHLORIDE SERPL-SCNC: 110 MMOL/L (ref 98–112)
CO2 SERPL-SCNC: 26 MMOL/L (ref 21–32)
CREAT BLD-MCNC: 0.84 MG/DL
DEPRECATED RDW RBC AUTO: 41.8 FL (ref 35.1–46.3)
EGFRCR SERPLBLD CKD-EPI 2021: 66 ML/MIN/1.73M2 (ref 60–?)
EOSINOPHIL # BLD AUTO: 0.13 X10(3) UL (ref 0–0.7)
EOSINOPHIL NFR BLD AUTO: 1.7 %
ERYTHROCYTE [DISTWIDTH] IN BLOOD BY AUTOMATED COUNT: 12.7 % (ref 11–15)
FASTING STATUS PATIENT QL REPORTED: YES
GLOBULIN PLAS-MCNC: 2.9 G/DL (ref 2.8–4.4)
GLUCOSE BLD-MCNC: 92 MG/DL (ref 70–99)
HCT VFR BLD AUTO: 41.4 %
HGB BLD-MCNC: 13.7 G/DL
IMM GRANULOCYTES # BLD AUTO: 0.02 X10(3) UL (ref 0–1)
IMM GRANULOCYTES NFR BLD: 0.3 %
LYMPHOCYTES # BLD AUTO: 1.96 X10(3) UL (ref 1–4)
LYMPHOCYTES NFR BLD AUTO: 25 %
MCH RBC QN AUTO: 29.7 PG (ref 26–34)
MCHC RBC AUTO-ENTMCNC: 33.1 G/DL (ref 31–37)
MCV RBC AUTO: 89.6 FL
MONOCYTES # BLD AUTO: 0.49 X10(3) UL (ref 0.1–1)
MONOCYTES NFR BLD AUTO: 6.2 %
NEUTROPHILS # BLD AUTO: 5.21 X10 (3) UL (ref 1.5–7.7)
NEUTROPHILS # BLD AUTO: 5.21 X10(3) UL (ref 1.5–7.7)
NEUTROPHILS NFR BLD AUTO: 66.3 %
OSMOLALITY SERPL CALC.SUM OF ELEC: 294 MOSM/KG (ref 275–295)
PLATELET # BLD AUTO: 337 10(3)UL (ref 150–450)
POTASSIUM SERPL-SCNC: 3.8 MMOL/L (ref 3.5–5.1)
PROT SERPL-MCNC: 7.3 G/DL (ref 5.7–8.2)
RBC # BLD AUTO: 4.62 X10(6)UL
SODIUM SERPL-SCNC: 142 MMOL/L (ref 136–145)
T PALLIDUM AB SER QL IA: NONREACTIVE
TSI SER-ACNC: 1.8 MIU/ML (ref 0.55–4.78)
VIT B12 SERPL-MCNC: >2000 PG/ML (ref 211–911)
WBC # BLD AUTO: 7.9 X10(3) UL (ref 4–11)

## 2024-03-18 PROCEDURE — 85025 COMPLETE CBC W/AUTO DIFF WBC: CPT

## 2024-03-18 PROCEDURE — 86618 LYME DISEASE ANTIBODY: CPT

## 2024-03-18 PROCEDURE — 99213 OFFICE O/P EST LOW 20 MIN: CPT | Performed by: INTERNAL MEDICINE

## 2024-03-18 PROCEDURE — 82607 VITAMIN B-12: CPT

## 2024-03-18 PROCEDURE — 80053 COMPREHEN METABOLIC PANEL: CPT

## 2024-03-18 PROCEDURE — 86780 TREPONEMA PALLIDUM: CPT

## 2024-03-18 PROCEDURE — 36415 COLL VENOUS BLD VENIPUNCTURE: CPT

## 2024-03-18 PROCEDURE — 84207 ASSAY OF VITAMIN B-6: CPT

## 2024-03-18 PROCEDURE — G2211 COMPLEX E/M VISIT ADD ON: HCPCS | Performed by: INTERNAL MEDICINE

## 2024-03-18 PROCEDURE — 84443 ASSAY THYROID STIM HORMONE: CPT

## 2024-03-20 LAB — B BURGDOR IGG+IGM SER QL: NEGATIVE

## 2024-03-23 LAB — VITAMIN B6: 5.7 UG/L

## 2024-03-23 NOTE — PROGRESS NOTES
Subjective:     Patient ID: Beronica Hernandez is a 89 year old female.  Presents for follow-up on dizziness and fatigue,    HPI  Patient continues to complain of feeling fatigued, sleeps with the help of lorazepam, living with a chronic left chest wall pain which present for years, was treated by multiple specialist in the past failed pain management, was advised to have intraspinal stimulator that she declined.  Continues to feel unsteady on her feet, history of radiation treatment for the brain meningioma, also has known severe cervical spinal stenosis C6-C7 7, encroachment of the left C5-C6-C7 nerve roots.    Current Outpatient Medications   Medication Sig Dispense Refill    LORazepam 0.5 MG Oral Tab Take 1 tablet (0.5 mg total) by mouth nightly. 90 tablet 0     Allergies:  Allergies   Allergen Reactions    Penicillins HALLUCINATION    Lyrica [Pregabalin] RASH    Bactrim Ds RASH    Norco [Hydrocodone-Acetaminophen] RASH     1/16/23 Currently taking Norco prn without any side effects       Past Medical History:   Diagnosis Date    Actinic keratosis 2023    right ala    Actinic keratosis 2023    right nasal dorsum    Brain tumor (HCC)     Hyperlipidemia     Meningioma (HCC)     Skin cancer     left hand      Past Surgical History:   Procedure Laterality Date    APPENDECTOMY      CYST ASPIRATION LEFT      breast    HYSTERECTOMY      TVH due to prolapse    KNEE SCOPE,DIAGNOSTIC      right knee    OTHER SURGICAL HISTORY Left 2013    Excision of skin cancer with grafting      Family History   Problem Relation Age of Onset    Diabetes Mother       Social History:   Social History     Socioeconomic History    Marital status:    Tobacco Use    Smoking status: Never     Passive exposure: Never    Smokeless tobacco: Never   Vaping Use    Vaping Use: Never used   Substance and Sexual Activity    Alcohol use: Not Currently     Comment: 1 beer per month    Drug use: No   Other Topics Concern    Caffeine Concern No      Comment: 2 cups coffee daily    Exercise Yes     Comment: walking    Breast feeding No    Reaction to local anesthetic No    Pt has a pacemaker No    Pt has a defibrillator No   Social History Narrative    The patient does not use an assistive device..      The patient does live in a home with stairs.        /80   Pulse 83   Resp 16   Ht 5' 3\" (1.6 m)   Wt 142 lb (64.4 kg)   BMI 25.15 kg/m²    Physical Exam  Constitutional:       Appearance: Normal appearance.   HENT:      Head: Normocephalic and atraumatic.   Cardiovascular:      Rate and Rhythm: Normal rate and regular rhythm.      Heart sounds: No murmur heard.     No gallop.   Pulmonary:      Effort: Pulmonary effort is normal.      Breath sounds: No wheezing or rhonchi.   Musculoskeletal:         General: Normal range of motion.      Cervical back: Normal range of motion and neck supple.      Right lower leg: No edema.      Left lower leg: No edema.   Skin:     General: Skin is warm.      Coloration: Skin is not jaundiced.   Neurological:      General: No focal deficit present.      Mental Status: She is alert and oriented to person, place, and time.      Gait: Gait abnormal (Walks with unsteady gait).   Psychiatric:         Mood and Affect: Mood normal.         Behavior: Behavior normal.         Thought Content: Thought content normal.         Assessment & Plan:   1. Dizziness multifactorial, due to meningioma possible element of cervical myelopathy, patient declined neurosurgical evaluation as she is not a surgical candidate and she states no surgeries for me.  Advised patient to use walker to avoid falls.   2. Other fatigue multifactorial rule out hypothyroidism with history of radiation therapy check CBC CMP and complete labs ordered by neurologist in the past       Orders Placed This Encounter   Procedures    CBC With Differential With Platelet    Comp Metabolic Panel (14)    TSH W Reflex To Free T4    Vitamin B12    Lyme Disease, Total Ab W  Rflx [E]    Vitamin B6    T Pallidum Screening [place] TREP [E]       Meds This Visit:  Requested Prescriptions      No prescriptions requested or ordered in this encounter     Follow-up as needed  Imaging & Referrals:  None

## 2024-04-30 ENCOUNTER — OFFICE VISIT (OUTPATIENT)
Dept: INTERNAL MEDICINE CLINIC | Facility: CLINIC | Age: 89
End: 2024-04-30

## 2024-04-30 ENCOUNTER — LAB ENCOUNTER (OUTPATIENT)
Dept: LAB | Age: 89
End: 2024-04-30
Attending: INTERNAL MEDICINE
Payer: MEDICARE

## 2024-04-30 VITALS
BODY MASS INDEX: 24.1 KG/M2 | DIASTOLIC BLOOD PRESSURE: 79 MMHG | HEART RATE: 81 BPM | RESPIRATION RATE: 16 BRPM | WEIGHT: 136 LBS | HEIGHT: 63 IN | SYSTOLIC BLOOD PRESSURE: 140 MMHG

## 2024-04-30 DIAGNOSIS — M79.2 NEURALGIA: Primary | ICD-10-CM

## 2024-04-30 DIAGNOSIS — R19.7 DIARRHEA OF PRESUMED INFECTIOUS ORIGIN: ICD-10-CM

## 2024-04-30 PROCEDURE — 99214 OFFICE O/P EST MOD 30 MIN: CPT | Performed by: INTERNAL MEDICINE

## 2024-04-30 RX ORDER — TRAMADOL HYDROCHLORIDE 50 MG/1
TABLET ORAL
Qty: 30 TABLET | Refills: 1 | Status: SHIPPED | OUTPATIENT
Start: 2024-04-30

## 2024-05-01 ENCOUNTER — LAB ENCOUNTER (OUTPATIENT)
Dept: LAB | Age: 89
End: 2024-05-01
Attending: INTERNAL MEDICINE
Payer: MEDICARE

## 2024-05-01 DIAGNOSIS — R19.7 DIARRHEA OF PRESUMED INFECTIOUS ORIGIN: ICD-10-CM

## 2024-05-01 LAB
CRYPTOSP AG STL QL IA: NEGATIVE
G LAMBLIA AG STL QL IA: NEGATIVE

## 2024-05-01 PROCEDURE — 87329 GIARDIA AG IA: CPT

## 2024-05-01 PROCEDURE — 87272 CRYPTOSPORIDIUM AG IF: CPT

## 2024-05-01 PROCEDURE — 87493 C DIFF AMPLIFIED PROBE: CPT

## 2024-05-01 PROCEDURE — 87046 STOOL CULTR AEROBIC BACT EA: CPT

## 2024-05-01 PROCEDURE — 87427 SHIGA-LIKE TOXIN AG IA: CPT

## 2024-05-01 PROCEDURE — 87045 FECES CULTURE AEROBIC BACT: CPT

## 2024-05-02 LAB — C DIFF TOX B STL QL: NEGATIVE

## 2024-05-06 NOTE — PROGRESS NOTES
Subjective:     Patient ID: Beronica Hernandez is a 89 year old female.  Presents for evaluation of diarrhea, chronic pain  HPI  Patient reports last 3-week having multiple loose watery diarrheal stools to the point that she cannot control them.  No recent antibiotic use no recent travel.  Denies abdominal pain, most of the time she eats at home.  Bothered by chronic pain in the months in the past include pain specialist management and later placement.  States the pain usually starts and gets more severe after she eats.  At night she takes lorazepam occasionally helps her sleep and she has no pain in her sleep.  Looking for some relief of the pain during the day.  Norco in the past made her very sleepy and was not very helpful.    Current Outpatient Medications   Medication Sig Dispense Refill    traMADol 50 MG Oral Tab Take  1tab po twice a day 30 tablet 1    LORazepam 0.5 MG Oral Tab Take 1 tablet (0.5 mg total) by mouth nightly. 90 tablet 0     Allergies:  Allergies   Allergen Reactions    Penicillins HALLUCINATION    Lyrica [Pregabalin] RASH    Bactrim Ds RASH    Norco [Hydrocodone-Acetaminophen] RASH     1/16/23 Currently taking Norco prn without any side effects       Past Medical History:    Actinic keratosis    right ala    Actinic keratosis    right nasal dorsum    Brain tumor (HCC)    Hyperlipidemia    Meningioma (HCC)    Skin cancer    left hand      Past Surgical History:   Procedure Laterality Date    Appendectomy      Cyst aspiration left      breast    Hysterectomy      TVH due to prolapse    Knee scope,diagnostic      right knee    Other surgical history Left 2013    Excision of skin cancer with grafting      Family History   Problem Relation Age of Onset    Diabetes Mother       Social History:   Social History     Socioeconomic History    Marital status:    Tobacco Use    Smoking status: Never     Passive exposure: Never    Smokeless tobacco: Never   Vaping Use    Vaping status: Never Used    Substance and Sexual Activity    Alcohol use: Not Currently     Comment: 1 beer per month    Drug use: No   Other Topics Concern    Caffeine Concern No     Comment: 2 cups coffee daily    Exercise Yes     Comment: walking    Breast feeding No    Reaction to local anesthetic No    Pt has a pacemaker No    Pt has a defibrillator No   Social History Narrative    The patient does not use an assistive device..      The patient does live in a home with stairs.        /79 (BP Location: Left arm, Patient Position: Sitting, Cuff Size: adult)   Pulse 81   Resp 16   Ht 5' 3\" (1.6 m)   Wt 136 lb (61.7 kg)   BMI 24.09 kg/m²    Physical Exam  Constitutional:       Appearance: Normal appearance.   HENT:      Head: Normocephalic and atraumatic.   Eyes:      General: No scleral icterus.     Extraocular Movements: Extraocular movements intact.      Conjunctiva/sclera: Conjunctivae normal.      Pupils: Pupils are equal, round, and reactive to light.   Cardiovascular:      Rate and Rhythm: Normal rate and regular rhythm.      Heart sounds: No murmur heard.  Pulmonary:      Effort: Pulmonary effort is normal.      Breath sounds: No wheezing or rhonchi.   Abdominal:      General: Bowel sounds are normal.      Palpations: Abdomen is soft. There is no mass.      Tenderness: There is no guarding or rebound.   Musculoskeletal:         General: Normal range of motion.      Cervical back: Normal range of motion and neck supple.      Right lower leg: No edema.      Left lower leg: No edema.   Lymphadenopathy:      Cervical: No cervical adenopathy.   Skin:     General: Skin is warm.      Coloration: Skin is not jaundiced.   Neurological:      General: No focal deficit present.      Mental Status: She is alert and oriented to person, place, and time. Mental status is at baseline.   Psychiatric:         Mood and Affect: Mood normal.         Thought Content: Thought content normal.         Assessment & Plan:   1. Neuralgia patient will  try tramadol 50 mg every 12 hours advised her to try first time during the day   2. Diarrhea of presumed infectious origin stool studies ordered   Discussed bland diet, rice toast keep up with fluids chicken broth    Orders Placed This Encounter   Procedures    Stool Culture [E]    Ova and Parasites (non-traveler) [E]    C. diff toxigenic PCR (OPT) [E]       Meds This Visit:  Requested Prescriptions     Signed Prescriptions Disp Refills    traMADol 50 MG Oral Tab 30 tablet 1     Sig: Take  1tab po twice a day       Imaging & Referrals:  None   Follow-up as needed

## 2024-05-08 ENCOUNTER — TELEPHONE (OUTPATIENT)
Dept: INTERNAL MEDICINE CLINIC | Facility: CLINIC | Age: 89
End: 2024-05-08

## 2024-05-08 NOTE — TELEPHONE ENCOUNTER
Spoke to patient, advised that stool studies are negative.  She states the diarrhea resolved.  Question if she takes tramadol she states that she takes twice a day, could not tell me if medication helped with the pain in the chest, states when she takes pill pain goes straight to her chest?  Same as previously.  I advised her to pay attention and  if medication is helpful if not I would recommend to discontinue medication in few days

## 2024-05-25 ENCOUNTER — APPOINTMENT (OUTPATIENT)
Dept: MRI IMAGING | Facility: HOSPITAL | Age: 89
End: 2024-05-25
Attending: EMERGENCY MEDICINE

## 2024-05-25 ENCOUNTER — TELEPHONE (OUTPATIENT)
Dept: INTERNAL MEDICINE CLINIC | Facility: CLINIC | Age: 89
End: 2024-05-25

## 2024-05-25 ENCOUNTER — HOSPITAL ENCOUNTER (EMERGENCY)
Facility: HOSPITAL | Age: 89
Discharge: HOME | End: 2024-05-25
Attending: EMERGENCY MEDICINE

## 2024-05-25 VITALS
BODY MASS INDEX: 25 KG/M2 | RESPIRATION RATE: 20 BRPM | OXYGEN SATURATION: 98 % | SYSTOLIC BLOOD PRESSURE: 123 MMHG | WEIGHT: 140 LBS | TEMPERATURE: 98 F | DIASTOLIC BLOOD PRESSURE: 72 MMHG | HEART RATE: 71 BPM

## 2024-05-25 DIAGNOSIS — R42 DIZZINESS: Primary | ICD-10-CM

## 2024-05-25 LAB
ANION GAP SERPL CALC-SCNC: 7 MMOL/L (ref 0–18)
BASOPHILS # BLD AUTO: 0.03 X10(3) UL (ref 0–0.2)
BASOPHILS NFR BLD AUTO: 0.5 %
BILIRUB UR QL: NEGATIVE
BUN BLD-MCNC: 12 MG/DL (ref 9–23)
BUN/CREAT SERPL: 13.2 (ref 10–20)
CALCIUM BLD-MCNC: 10.1 MG/DL (ref 8.7–10.4)
CHLORIDE SERPL-SCNC: 110 MMOL/L (ref 98–112)
CLARITY UR: CLEAR
CO2 SERPL-SCNC: 25 MMOL/L (ref 21–32)
COLOR UR: COLORLESS
CREAT BLD-MCNC: 0.91 MG/DL
DEPRECATED RDW RBC AUTO: 40.9 FL (ref 35.1–46.3)
EGFRCR SERPLBLD CKD-EPI 2021: 60 ML/MIN/1.73M2 (ref 60–?)
EOSINOPHIL # BLD AUTO: 0.08 X10(3) UL (ref 0–0.7)
EOSINOPHIL NFR BLD AUTO: 1.2 %
ERYTHROCYTE [DISTWIDTH] IN BLOOD BY AUTOMATED COUNT: 12.5 % (ref 11–15)
GLUCOSE BLD-MCNC: 92 MG/DL (ref 70–99)
GLUCOSE UR-MCNC: NORMAL MG/DL
HCT VFR BLD AUTO: 39.8 %
HGB BLD-MCNC: 13.8 G/DL
IMM GRANULOCYTES # BLD AUTO: 0.02 X10(3) UL (ref 0–1)
IMM GRANULOCYTES NFR BLD: 0.3 %
KETONES UR-MCNC: NEGATIVE MG/DL
LEUKOCYTE ESTERASE UR QL STRIP.AUTO: 250
LYMPHOCYTES # BLD AUTO: 2.25 X10(3) UL (ref 1–4)
LYMPHOCYTES NFR BLD AUTO: 34.1 %
MCH RBC QN AUTO: 30.6 PG (ref 26–34)
MCHC RBC AUTO-ENTMCNC: 34.7 G/DL (ref 31–37)
MCV RBC AUTO: 88.2 FL
MONOCYTES # BLD AUTO: 0.56 X10(3) UL (ref 0.1–1)
MONOCYTES NFR BLD AUTO: 8.5 %
NEUTROPHILS # BLD AUTO: 3.66 X10 (3) UL (ref 1.5–7.7)
NEUTROPHILS # BLD AUTO: 3.66 X10(3) UL (ref 1.5–7.7)
NEUTROPHILS NFR BLD AUTO: 55.4 %
NITRITE UR QL STRIP.AUTO: NEGATIVE
OSMOLALITY SERPL CALC.SUM OF ELEC: 293 MOSM/KG (ref 275–295)
PH UR: 5 [PH] (ref 5–8)
PLATELET # BLD AUTO: 303 10(3)UL (ref 150–450)
POTASSIUM SERPL-SCNC: 3.9 MMOL/L (ref 3.5–5.1)
PROT UR-MCNC: NEGATIVE MG/DL
RBC # BLD AUTO: 4.51 X10(6)UL
SODIUM SERPL-SCNC: 142 MMOL/L (ref 136–145)
SP GR UR STRIP: 1 (ref 1–1.03)
TROPONIN I SERPL HS-MCNC: 6 NG/L
UROBILINOGEN UR STRIP-ACNC: NORMAL
WBC # BLD AUTO: 6.6 X10(3) UL (ref 4–11)

## 2024-05-25 PROCEDURE — 99285 EMERGENCY DEPT VISIT HI MDM: CPT

## 2024-05-25 PROCEDURE — 93005 ELECTROCARDIOGRAM TRACING: CPT

## 2024-05-25 PROCEDURE — 36415 COLL VENOUS BLD VENIPUNCTURE: CPT

## 2024-05-25 PROCEDURE — 70551 MRI BRAIN STEM W/O DYE: CPT | Performed by: EMERGENCY MEDICINE

## 2024-05-25 PROCEDURE — 84484 ASSAY OF TROPONIN QUANT: CPT | Performed by: EMERGENCY MEDICINE

## 2024-05-25 PROCEDURE — 87186 SC STD MICRODIL/AGAR DIL: CPT | Performed by: EMERGENCY MEDICINE

## 2024-05-25 PROCEDURE — 93010 ELECTROCARDIOGRAM REPORT: CPT

## 2024-05-25 PROCEDURE — 99284 EMERGENCY DEPT VISIT MOD MDM: CPT

## 2024-05-25 PROCEDURE — 87077 CULTURE AEROBIC IDENTIFY: CPT | Performed by: EMERGENCY MEDICINE

## 2024-05-25 PROCEDURE — 85025 COMPLETE CBC W/AUTO DIFF WBC: CPT | Performed by: EMERGENCY MEDICINE

## 2024-05-25 PROCEDURE — 81001 URINALYSIS AUTO W/SCOPE: CPT | Performed by: EMERGENCY MEDICINE

## 2024-05-25 PROCEDURE — 80048 BASIC METABOLIC PNL TOTAL CA: CPT | Performed by: EMERGENCY MEDICINE

## 2024-05-25 PROCEDURE — 87086 URINE CULTURE/COLONY COUNT: CPT | Performed by: EMERGENCY MEDICINE

## 2024-05-25 RX ORDER — MECLIZINE HYDROCHLORIDE 25 MG/1
25 TABLET ORAL 3 TIMES DAILY PRN
Qty: 15 TABLET | Refills: 0 | Status: SHIPPED | OUTPATIENT
Start: 2024-05-25 | End: 2024-05-30

## 2024-05-25 RX ORDER — NITROFURANTOIN 25; 75 MG/1; MG/1
100 CAPSULE ORAL 2 TIMES DAILY
Qty: 10 CAPSULE | Refills: 0 | Status: SHIPPED | OUTPATIENT
Start: 2024-05-25 | End: 2024-05-30

## 2024-05-25 RX ORDER — MECLIZINE HYDROCHLORIDE 25 MG/1
25 TABLET ORAL ONCE
Status: COMPLETED | OUTPATIENT
Start: 2024-05-25 | End: 2024-05-25

## 2024-05-25 NOTE — TELEPHONE ENCOUNTER
Spoke to patient, advised her that she should go to emergency room today when her daughter arrives.  She gave me permission to talk to her daughter I attempted to call Taylor Zarate 2 times unable to leave message voicemail is full.  I speak to patient again she mentioned that she spoke to her daughter's  let him know that she and I trying to call her.  I reiterated importance of going to emergency room today when daughter comes.  Patient did not want me to call 911, stating that she is okay.

## 2024-05-25 NOTE — ED INITIAL ASSESSMENT (HPI)
Patient c/o dizziness and unsteady gait that has gotten worse the past couple days, but has had it for the past year from radiation she had on a tumor in her brain. Denies N/V. BEFAST negative

## 2024-05-25 NOTE — ED PROVIDER NOTES
Patient Seen in: [facility] Emergency Department    History     Chief Complaint   Patient presents with    Dizziness     Stated Complaint: Dizziness     HPI    89-year-old female with past medical history of dyslipidemia presenting with acute on chronic dizziness/unsteady gait - noting worsening symptoms for past few weeks and moreso this week. 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  No trauma.  No pain.  No vomiting or diarrhea. No vision loss.     Past Medical History:    Actinic keratosis    right ala    Actinic keratosis    right nasal dorsum    Brain tumor (HCC)    Hyperlipidemia    Meningioma (HCC)    Skin cancer    left hand       Past Surgical History:   Procedure Laterality Date    Appendectomy      Cyst aspiration left      breast    Hysterectomy      TVH due to prolapse    Knee scope,diagnostic      right knee    Other surgical history Left 2013    Excision of skin cancer with grafting            Family History   Problem Relation Age of Onset    Diabetes Mother        Social History     Socioeconomic History    Marital status:    Tobacco Use    Smoking status: Never     Passive exposure: Never    Smokeless tobacco: Never   Vaping Use    Vaping status: Never Used   Substance and Sexual Activity    Alcohol use: Not Currently     Comment: 1 beer per month    Drug use: No   Other Topics Concern    Caffeine Concern No     Comment: 2 cups coffee daily    Exercise Yes     Comment: walking    Breast feeding No    Reaction to local anesthetic No    Pt has a pacemaker No    Pt has a defibrillator No   Social History Narrative    The patient does not use an assistive device..      The patient does live in a home with stairs.       Review of Systems :  Constitutional: As per HPI  Neurological: Negative  for syncope and headaches. (+) dizziness.    Positive for stated complaint: Dizziness  Other systems are as noted in HPI.  Constitutional and vital signs reviewed.      All other systems reviewed and negative except as noted above.    PSFH elements reviewed from today and agreed except as otherwise stated in HPI.    Physical Exam     ED Triage Vitals [05/25/24 3818]   /76   Pulse 79   Resp 20   Temp 97.8 °F (36.6 °C)   Temp src Temporal   SpO2 96 %   O2 Device None (Room air)       Current:/76   Pulse 79   Temp 97.8 °F (36.6 °C) (Temporal)   Resp 20   Wt 63.5 kg   SpO2 96%   BMI 24.80 kg/m²         Physical Exam   Constitutional: No distress.   HEENT: MMM.  Head: Normocephalic. Atraumatic.  Eyes: No injection. Pupils midrange and equally reactive.  Neck: Neck supple. No meningismus.  Cardiovascular: RRR.   Pulmonary/Chest: Effort normal. CTAB.  Abdominal: Soft. Nontender.  Musculoskeletal: No gross deformity.  Neurological: Alert. CN II-XII grossly intact. BUE/BLE proximally and distally with 5/5 strength.  Skin: Skin is warm.   Psychiatric: Cooperative.  Nursing note and vitals reviewed.        ED Course     Labs Reviewed   URINALYSIS WITH CULTURE REFLEX - Abnormal; Notable for the following components:       Result Value    Urine Color Colorless (*)     Blood Urine Trace (*)     Leukocyte Esterase Urine 250 (*)     WBC Urine 6-10 (*)     Bacteria Urine 3+ (*)     Squamous Epi. Cells Few (*)     All other components within normal limits   BASIC METABOLIC PANEL (8) - Normal   TROPONIN I HIGH SENSITIVITY - Normal   CBC WITH DIFFERENTIAL WITH PLATELET    Narrative:     The following orders were created for panel order CBC With Differential With Platelet.  Procedure                               Abnormality         Status                     ---------                               -----------         ------                     CBC W/ DIFFERENTIAL[[id number]]                              Final result                  Please view results for these tests on the individual orders.   RAINBOW DRAW LAVENDER   RAINBOW DRAW LIGHT GREEN   RAINBOW DRAW BLUE   URINE CULTURE, ROUTINE   CBC W/ DIFFERENTIAL     EKG    Rate, intervals and axes as noted on EKG Report.  Rate: 81  Rhythm: Sinus Rhythm  Reading: NSR 81 without JOSÉ MIGUEL as independently interpreted by myself            MRI BRAIN WO ACUTE (3) SEQUENCE (CPT=70551)    Result Date: 5/25/2024  PROCEDURE: MRI BRAIN WO ACUTE (3) SEQUENCE(CPT=70551)  COMPARISON: [facility] [place] [facility], MRI BRAIN (W+WO) (CPT=70553), 11/17/2022, 11:23 AM.  [facility] [place] [facility], MRI BRAIN (W+WO) (CPT=70553), 12/16/2021, 10:26 AM.  MRI BRAIN (W+WO) (CPT=70553), 6/01/2021, 11:51  AM.  MRI BRAIN (W+WO) (CPT=70553), 10/18/2020, 10:26 AM.  [facility] [place] [facility], MRI BRAIN (W+WO) (CPT=70553), 12/11/2023, 1:12 PM.  [facility] [place] [facility], CT BRAIN OR HEAD (CPT=70450), 10/12/2020, 12:13 PM.  INDICATIONS: Dizziness. Focal psychomotor deficit.  TECHNIQUE: A limited ER 3-sequence stroke protocol study was conducted with diffusion weighted imaging, T2-weighted FLAIR, and gradient recalled echo images performed in the axial plane.  Images were performed without contrast.   FINDINGS:  CSF SPACES: The ventricles, cisterns, and sulci are dilated, but remain commensurate in caliber, consistent with parenchymal volume loss of a degree that is appropriate for age. No hydrocephalus, subarachnoid hemorrhage, or effacement of the basal cisterns is appreciated. There is no extra-axial fluid collection.  Please see the BRAINSTEM section below for further details CEREBRUM: There are periventricular and subcortical white matter T2 hyperintensities, compatible with chronic microvascular ischemic disease. No further focal signal abnormality of the gray or white matter is perceived.  No intraparenchymal hemorrhage, edema, or cortical sulcal  effacement is apparent. There is no space-occupying lesion, mass effect, or shift of midline structures. There is no diffusion restriction to suggest acute or subacute ischemia. CEREBELLUM: No edema, hemorrhage, mass, or acute infarction is seen. There is cerebellar folial expansion consistent with atrophy.  BRAINSTEM: At the right aspect of the pontomedullary angle, there is redemonstration of an extra-axial 1.2 x 2.0 cm dural-based lesion exerting mass effect at the ventral right aspect of the taye and medulla.  The craniocervical junction is otherwise uncompromised.  CALVARIUM: There is no apparent fracture, mass, or other significant visible lesion.  SINUSES: Limited views demonstrate no significant mucosal thickening or fluid. Rightward nasal septal deviation is present. ORBITS: Limited views are notable for postoperative changes of the lenses bilaterally.  OTHER: The flow voids of the major intracranial vessels are visualized.           CONCLUSION:  Limited 3-sequence stroke protocol study was performed. Within these parameters: 1. No acute intracranial process by noncontrast MR technique.  2. Stable dural-based right pontomedullary angle meningioma with unchanged mass effect exerted on the right aspect of the medulla and taye.  3. Senescent changes of parenchymal volume loss with sequela of chronic microvascular ischemic disease.  4. Lesser incidental findings as above.      elm-remote.   Dictated by (CST): Nirav Aleman MD on 5/25/2024 at 7:01 PM     Finalized by (CST): Nirav Aleman MD on 5/25/2024 at 7:05 PM           MDM   DIFFERENTIAL DIAGNOSIS: After history and physical exam differential diagnosis includes but is not limited to intracranial mass, CVA, anemia, electrolyte derangement.    Pulse ox: 96%:Normal on RA, as independently interpreted by myself    Medical Decision Making  Evaluation for acute on chronic dizzines in setting known posterior fossa mass s/p XRT - imaging/labs nonacute, UA as  noted for which culture sent and empiric macrobid initiated. Symptoms resolved with meclizine, MRI without acute change - will discharge with ongoing outpatient followup.    Problems Addressed:  Dizziness: chronic illness or injury with exacerbation, progression, or side effects of treatment    Amount and/or Complexity of Data Reviewed  External Data Reviewed: radiology and notes.     Details: 12/11/2023 MRI reviewed, 12/29/2023 rad onc note reviewed  Labs: ordered. Decision-making details documented in ED Course.  Radiology: ordered and independent interpretation performed. Decision-making details documented in ED Course.     Details: MRI with posterior fossa lesion as as independently interpreted by myself  ECG/medicine tests: ordered and independent interpretation performed. Decision-making details documented in ED Course.    Risk  Prescription drug management.        I was wearing at minimum a facemask and eye protection throughout this encounter with handwashing performed prior and after patient evaluation without personal hand/facial/oropharyngeal contact and gloves worn throughout encounter. See note and/or contact this provider for further PPE details.    Disposition and Plan     Clinical Impression:  1. Dizziness      Disposition:  Discharge    Follow-up:  Carole Cutler MD  [address] [place] [id number] [phone]    Call  For followup and re-evaluation.    Cristofer Salcido MD  [address] [place] [address] [phone]    Call  For neurology followup and re-evaluation.      Medications Prescribed:  Discharge Medication List as of 5/25/2024  7:09 PM        START taking these medications    Details   nitrofurantoin monohydrate macro 100 MG Oral Cap Take 1 capsule (100 mg total) by mouth 2 (two) times daily for 5 days., Normal, Disp-10 capsule, R-0      meclizine 25 MG Oral Tab Take 1 tablet (25 mg total) by mouth 3 (three) times daily as needed for Dizziness., Normal, Disp-15  tablet, R-0

## 2024-05-25 NOTE — TELEPHONE ENCOUNTER
Spoke to daughter Taylor Zarate.  I reviewed my concern about patient's safety being dizzy more lately, living by herself.  Episodes of sudden shakiness associated with dizziness possible atypical seizures, patient had radiation to the brain to treat meningioma.  Needs to see neurologist and seizures needs to be ruled out EEG test needs to be done.  Patient also has critical cervical spinal stenosis that in an ideal situation requires surgery which patient declines, patient is 89 years old.  Spinal stenosis myelopathy can contribute to dizziness and ataxia.  I advised daughter to when she comes to visit patient today to take her to emergency room for evaluation.  In the future I recommend that patient does not live by herself anymore/I advised that to patient in the past but she does not want to stress her family./  Also advised that patient should not be driving with having increased dizziness.

## 2024-05-25 NOTE — TELEPHONE ENCOUNTER
On Call: Patient seen in the office on 3/18/24 for dizziness. She is calling today \"experiencing shakiness and dizziness. Using a cane. She's asking for medication to help her with this. Her daughter is coming today. She is sitting on the couch and is shaking. She had 2 cups of coffee but she's used to that.\"      Offered office visit with NEGRITO MARTIN but declined. Advised immediate care.     She prefer's I ask Dr. Cutler to send medication for dizziness. Please advise

## 2024-05-26 DIAGNOSIS — F41.9 ANXIETY: ICD-10-CM

## 2024-05-26 LAB
ATRIAL RATE: 81 BPM
P AXIS: 42 DEGREES
P-R INTERVAL: 206 MS
Q-T INTERVAL: 380 MS
QRS DURATION: 78 MS
QTC CALCULATION (BEZET): 441 MS
R AXIS: -36 DEGREES
T AXIS: 39 DEGREES
VENTRICULAR RATE: 81 BPM

## 2024-05-27 NOTE — PROGRESS NOTES
ED Culture Callback Results Review    Pharmacist reviewed culture results from ED visit .    Final urine culture positive for e. coli. Patient was prescribed Nitrofurantoin (Macrobid) on discharge. Current therapy is appropriate based on reported susceptibilities. No further intervention required at this time.      Kitty Garcia, Jack  Emergency Medicine Pharmacist Specialist  05/27/24; 11:36 AM

## 2024-05-28 ENCOUNTER — TELEPHONE (OUTPATIENT)
Dept: INTERNAL MEDICINE CLINIC | Facility: CLINIC | Age: 89
End: 2024-05-28

## 2024-05-28 NOTE — TELEPHONE ENCOUNTER
Patient was called and she stated that she is on a antibiotic for her UTI. Per patient she needs her lorazepam refilled.  I noted that there is a refill request already. This encounter will be closed.

## 2024-05-28 NOTE — TELEPHONE ENCOUNTER
Seen in [place] ER on 5/25/24:    Clinical Impression:  1. Dizziness       Disposition:  Discharge     Follow-up:  Carole Cutler MD  [address] [place] [address] [phone]     Call  For followup and re-evaluation.     Cristofer Salcido MD  [address] [place] [address] [phone]     Call  For neurology followup and re-evaluation.        Medications Prescribed:  Discharge Medication List as of 5/25/2024  7:09 PM              START taking these medications     Details   nitrofurantoin monohydrate macro 100 MG Oral Cap Take 1 capsule (100 mg total) by mouth 2 (two) times daily for 5 days., Normal, Disp-10 capsule, R-0       meclizine 25 MG Oral Tab Take 1 tablet (25 mg total) by mouth 3 (three) times daily as needed for Dizziness., Normal, Disp-15 tablet, R-0

## 2024-05-29 ENCOUNTER — PATIENT OUTREACH (OUTPATIENT)
Dept: INTERNAL MEDICINE CLINIC | Facility: CLINIC | Age: 89
End: 2024-05-29

## 2024-05-29 RX ORDER — LORAZEPAM 0.5 MG/1
0.5 TABLET ORAL NIGHTLY
Qty: 90 TABLET | Refills: 0 | Status: SHIPPED | OUTPATIENT
Start: 2024-05-31

## 2024-05-29 NOTE — TELEPHONE ENCOUNTER
Please review. Rx failed/no protocol.    Recent fills (qty #90 ea for a 90 day supply): 03/02/24  Last prescription written on: 03/02/24 (due date: 05/31/24, okay to refill 05/29/24)  Last office visit with primary care provider: 04/30/24  Requested Prescriptions   Pending Prescriptions Disp Refills    LORAZEPAM 0.5 MG Oral Tab [Pharmacy Med Name: Lorazepam 0.5 Mg Tab Teva] 90 tablet 0     Sig: TAKE 1 TABLET BY MOUTH NIGHTLY       Controlled Substance Medication Failed - 5/26/2024 11:09 AM        Failed - This medication is a controlled substance - forward to provider to refill             Future Appointments         Provider Department Appt Notes    In 1 week Carole Cutler MD [facility], [address], [place] Bitter taste in mouth, unable to taste          Recent Outpatient Visits              4 weeks ago Neuralgia    [facility] [place] Carole Cutler MD    Office Visit    [facility]    [facility]    [facility]    Office Visit    5 months ago Meningioma, cerebral (HCC)    [facility] - Rad/Onc Mc Mendez MD    Office Visit    6 months ago Dizziness    [facility] Cristofer Salcido MD    Office Visit

## 2024-05-29 NOTE — PROGRESS NOTES
Called pt and spoke with daughter Taylor to schedule ER follow-up appts :    Carole Cutler MD  [address] [place] [address] [phone]  Friday 6/7 @1:40pm w/ Dr. Cutler (exisitng appt), pt daughter declined scheduling anything sooner.     Cristofer Salcido MD  [address] [place] [id number] [phone]  Declined - pt daughter states that her mother refuses to schedule a Neurology appt at this time    No further assistance needed.    Closing encounter

## 2024-06-07 ENCOUNTER — OFFICE VISIT (OUTPATIENT)
Dept: INTERNAL MEDICINE CLINIC | Facility: CLINIC | Age: 89
End: 2024-06-07

## 2024-06-07 VITALS
WEIGHT: 135 LBS | RESPIRATION RATE: 18 BRPM | SYSTOLIC BLOOD PRESSURE: 149 MMHG | BODY MASS INDEX: 24 KG/M2 | DIASTOLIC BLOOD PRESSURE: 96 MMHG | HEART RATE: 97 BPM

## 2024-06-07 DIAGNOSIS — R07.89 LEFT-SIDED CHEST WALL PAIN: ICD-10-CM

## 2024-06-07 DIAGNOSIS — R42 DIZZINESS: Primary | ICD-10-CM

## 2024-06-07 DIAGNOSIS — R25.1 TREMOR: ICD-10-CM

## 2024-06-07 PROCEDURE — G2211 COMPLEX E/M VISIT ADD ON: HCPCS | Performed by: INTERNAL MEDICINE

## 2024-06-07 PROCEDURE — 99214 OFFICE O/P EST MOD 30 MIN: CPT | Performed by: INTERNAL MEDICINE

## 2024-06-07 RX ORDER — GABAPENTIN 100 MG/1
CAPSULE ORAL
Qty: 30 CAPSULE | Refills: 0 | Status: SHIPPED | OUTPATIENT
Start: 2024-06-07

## 2024-06-07 RX ORDER — MECLIZINE HYDROCHLORIDE 25 MG/1
25 TABLET ORAL 2 TIMES DAILY PRN
COMMUNITY
Start: 2024-05-30

## 2024-06-07 NOTE — PROGRESS NOTES
Subjective:     Patient ID: Beronica Hernandez is a 89 year old female.  Presents for follow-up after recent ER visit    HPI  Patient continues to feel imbalanced, harder and harder for her to walk.  Using cane now, has not fallen, try to be very careful at home when takes showers or does stuff.  Seen in the emergency room recently MRI of the brain did not show any new abnormality she was found to have UTI was treated with Macrobid, states that she has chronic urinary problems.  She was given meclizine which is not helpful at all, tried this in the test    She reports more head an hand tremors.  Continues to be bothered by chronic pain in the left side of the chest, was treated in the past by pain specialist with multiple injections, tried multiple medications without relief.  She turned down spinal stimulator.  States that drinking water iand taking medication sets of pain in the left side of the chest    Continues to complain of bitter taste no sense of smell or taste at all present for several years.    Current Outpatient Medications   Medication Sig Dispense Refill    meclizine 25 MG Oral Tab Take 1 tablet (25 mg total) by mouth 2 (two) times daily as needed.      gabapentin 100 MG Oral Cap Take  1tab po daily in and for 1 week increase to 1 tab po twice a day 30 capsule 0    LORazepam 0.5 MG Oral Tab Take 1 tablet (0.5 mg total) by mouth nightly. 90 tablet 0    traMADol 50 MG Oral Tab Take  1tab po twice a day 30 tablet 1     Allergies:  Allergies   Allergen Reactions    Penicillins HALLUCINATION    Lyrica [Pregabalin] RASH    Bactrim Ds RASH    Norco [Hydrocodone-Acetaminophen] RASH     1/16/23 Currently taking Norco prn without any side effects       Past Medical History:    Actinic keratosis    right ala    Actinic keratosis    right nasal dorsum    Brain tumor (HCC)    Hyperlipidemia    Meningioma (HCC)    Skin cancer    left hand      Past Surgical History:   Procedure Laterality Date    Appendectomy       Cyst aspiration left      breast    Hysterectomy      TVH due to prolapse    Knee scope,diagnostic      right knee    Other surgical history Left 2013    Excision of skin cancer with grafting      Family History   Problem Relation Age of Onset    Diabetes Mother       Social History:   Social History     Socioeconomic History    Marital status:    Tobacco Use    Smoking status: Never     Passive exposure: Never    Smokeless tobacco: Never   Vaping Use    Vaping status: Never Used   Substance and Sexual Activity    Alcohol use: Not Currently     Comment: 1 beer per month    Drug use: No   Other Topics Concern    Caffeine Concern No     Comment: 2 cups coffee daily    Exercise Yes     Comment: walking    Breast feeding No    Reaction to local anesthetic No    Pt has a pacemaker No    Pt has a defibrillator No   Social History Narrative    The patient does not use an assistive device..      The patient does live in a home with stairs.        BP (!) 149/96 (BP Location: Right arm, Patient Position: Sitting, Cuff Size: large)   Pulse 97   Resp 18   Wt 135 lb (61.2 kg)   BMI 23.91 kg/m²    Physical Exam  Constitutional:       Appearance: Normal appearance.   Eyes:      General: No scleral icterus.     Extraocular Movements: Extraocular movements intact.      Conjunctiva/sclera: Conjunctivae normal.      Pupils: Pupils are equal, round, and reactive to light.   Cardiovascular:      Rate and Rhythm: Normal rate and regular rhythm.      Heart sounds: No murmur heard.  Pulmonary:      Effort: Pulmonary effort is normal.      Breath sounds: Normal breath sounds. No wheezing or rhonchi.   Musculoskeletal:         General: Normal range of motion.      Cervical back: Normal range of motion and neck supple.      Right lower leg: No edema.      Left lower leg: No edema.   Skin:     General: Skin is warm.      Coloration: Skin is not jaundiced.   Neurological:      General: No focal deficit present.      Mental Status:  She is alert and oriented to person, place, and time.      Coordination: Coordination abnormal.      Gait: Gait abnormal.   Psychiatric:         Mood and Affect: Mood normal.         Behavior: Behavior normal.         Thought Content: Thought content normal.         Assessment & Plan:   1. Dizziness multifactorial cerebellar?  In origin.  Sequela of radiation?  Element of cervical myelopathy  Advised patient to revisit with neurology see if any medication can be helpful?   2. Tremor etiology to be determined   3,       chest wall pain will try gabapentin 100 mg daily for 1 week increase to 100 mg twice a day report how she tolerates medication in 2 weeks    No orders of the defined types were placed in this encounter.      Meds This Visit:  Requested Prescriptions     Signed Prescriptions Disp Refills    gabapentin 100 MG Oral Cap 30 capsule 0     Sig: Take  1tab po daily in and for 1 week increase to 1 tab po twice a day       Imaging & Referrals:  None

## 2024-08-26 DIAGNOSIS — F41.9 ANXIETY: ICD-10-CM

## 2024-08-27 RX ORDER — LORAZEPAM 0.5 MG/1
0.5 TABLET ORAL NIGHTLY
Qty: 90 TABLET | Refills: 0 | Status: SHIPPED | OUTPATIENT
Start: 2024-08-27

## 2024-08-27 NOTE — TELEPHONE ENCOUNTER
Please Review. Protocol Failed; No Protocol   05/31/2024 03/02/2024  Last written 05/29/2024  Recent Visits  Date Type Provider Dept   06/07/24 Office Visit Carole Cutler MD [facility]   04/30/24 Office Visit Carole Cutler MD [facility]   03/18/24 Office Visit Carole Cutler MD [facility]   02/05/24 Office Visit Carole Cutler MD [facility]   10/30/23 Office Visit Carole Cutler MD [facility]   03/14/23 Office Visit Carole Cutler MD [facility]   Showing recent visits within past 540 days with a meds authorizing provider and meeting all other requirements  Future Appointments  Date Type Provider Dept   09/06/24 Appointment Carole Cutler MD [facility]   Showing future appointments within next 150 days with a meds authorizing provider and meeting all other requirements    Requested Prescriptions   Pending Prescriptions Disp Refills    LORAZEPAM 0.5 MG Oral Tab [Pharmacy Med Name: Lorazepam 0.5 Mg Tab Auro] 90 tablet 0     Sig: TAKE ONE TABLET BY MOUTH ONE TIME NIGHTLY       Controlled Substance Medication Failed - 8/26/2024  4:04 PM        Failed - This medication is a controlled substance - forward to provider to refill               Future Appointments         Provider Department Appt Notes    In 1 week Carole Cutler MD [facility], [address], [place] follow up on occasional dizziness    In 3 weeks Anne Marie Orlando MD [facility], [address], [place] check spots on hands          Recent Outpatient Visits              2 months ago Dizziness    [facility], [address], [place] Carole Cutler MD    Office Visit    3 months ago Neuralgia    [facility], [address], [place] Carole Cutler MD    Office Visit    [facility]    [facility] Visit    6 months ago Anxiety    [facility],  [address], [place] Carole Cutler MD    Office Visit    8 months ago Meningioma, cerebral (HCC)    [facility] - Rad/Onc Mc Mendez MD    Office Visit

## 2024-09-06 ENCOUNTER — OFFICE VISIT (OUTPATIENT)
Dept: INTERNAL MEDICINE CLINIC | Facility: CLINIC | Age: 89
End: 2024-09-06

## 2024-09-06 VITALS
WEIGHT: 137 LBS | BODY MASS INDEX: 24.27 KG/M2 | DIASTOLIC BLOOD PRESSURE: 78 MMHG | HEART RATE: 80 BPM | SYSTOLIC BLOOD PRESSURE: 159 MMHG | HEIGHT: 63 IN

## 2024-09-06 DIAGNOSIS — R26.89 BALANCE DISORDER: Primary | ICD-10-CM

## 2024-09-06 DIAGNOSIS — M79.2 NEURALGIA: ICD-10-CM

## 2024-09-06 DIAGNOSIS — R53.83 OTHER FATIGUE: ICD-10-CM

## 2024-09-06 PROCEDURE — 99214 OFFICE O/P EST MOD 30 MIN: CPT | Performed by: INTERNAL MEDICINE

## 2024-09-06 RX ORDER — DULOXETIN HYDROCHLORIDE 20 MG/1
20 CAPSULE, DELAYED RELEASE ORAL DAILY
Qty: 90 CAPSULE | Refills: 0 | Status: SHIPPED | OUTPATIENT
Start: 2024-09-06

## 2024-09-06 RX ORDER — PANTOPRAZOLE SODIUM 40 MG/1
40 TABLET, DELAYED RELEASE ORAL
Qty: 90 TABLET | Refills: 0 | Status: SHIPPED | OUTPATIENT
Start: 2024-09-06

## 2024-09-07 ENCOUNTER — LAB ENCOUNTER (OUTPATIENT)
Dept: LAB | Age: 89
End: 2024-09-07
Attending: INTERNAL MEDICINE
Payer: MEDICARE

## 2024-09-07 DIAGNOSIS — R53.83 OTHER FATIGUE: ICD-10-CM

## 2024-09-07 LAB
ALBUMIN SERPL-MCNC: 4.5 G/DL (ref 3.2–4.8)
ALBUMIN/GLOB SERPL: 1.4 {RATIO} (ref 1–2)
ALP LIVER SERPL-CCNC: 57 U/L
ALT SERPL-CCNC: 7 U/L
ANION GAP SERPL CALC-SCNC: 8 MMOL/L (ref 0–18)
AST SERPL-CCNC: 16 U/L (ref ?–34)
BASOPHILS # BLD AUTO: 0.03 X10(3) UL (ref 0–0.2)
BASOPHILS NFR BLD AUTO: 0.4 %
BILIRUB SERPL-MCNC: 0.7 MG/DL (ref 0.2–1.1)
BUN BLD-MCNC: 13 MG/DL (ref 9–23)
BUN/CREAT SERPL: 15.3 (ref 10–20)
CALCIUM BLD-MCNC: 9.9 MG/DL (ref 8.7–10.4)
CHLORIDE SERPL-SCNC: 107 MMOL/L (ref 98–112)
CO2 SERPL-SCNC: 28 MMOL/L (ref 21–32)
CREAT BLD-MCNC: 0.85 MG/DL
DEPRECATED RDW RBC AUTO: 42.8 FL (ref 35.1–46.3)
EGFRCR SERPLBLD CKD-EPI 2021: 65 ML/MIN/1.73M2 (ref 60–?)
EOSINOPHIL # BLD AUTO: 0.07 X10(3) UL (ref 0–0.7)
EOSINOPHIL NFR BLD AUTO: 0.9 %
ERYTHROCYTE [DISTWIDTH] IN BLOOD BY AUTOMATED COUNT: 13.1 % (ref 11–15)
FASTING STATUS PATIENT QL REPORTED: YES
GLOBULIN PLAS-MCNC: 3.3 G/DL (ref 2–3.5)
GLUCOSE BLD-MCNC: 108 MG/DL (ref 70–99)
HCT VFR BLD AUTO: 41.7 %
HGB BLD-MCNC: 13.8 G/DL
IMM GRANULOCYTES # BLD AUTO: 0.02 X10(3) UL (ref 0–1)
IMM GRANULOCYTES NFR BLD: 0.3 %
LYMPHOCYTES # BLD AUTO: 1.67 X10(3) UL (ref 1–4)
LYMPHOCYTES NFR BLD AUTO: 21.3 %
MCH RBC QN AUTO: 29.9 PG (ref 26–34)
MCHC RBC AUTO-ENTMCNC: 33.1 G/DL (ref 31–37)
MCV RBC AUTO: 90.5 FL
MONOCYTES # BLD AUTO: 0.43 X10(3) UL (ref 0.1–1)
MONOCYTES NFR BLD AUTO: 5.5 %
NEUTROPHILS # BLD AUTO: 5.61 X10 (3) UL (ref 1.5–7.7)
NEUTROPHILS # BLD AUTO: 5.61 X10(3) UL (ref 1.5–7.7)
NEUTROPHILS NFR BLD AUTO: 71.6 %
OSMOLALITY SERPL CALC.SUM OF ELEC: 297 MOSM/KG (ref 275–295)
PLATELET # BLD AUTO: 351 10(3)UL (ref 150–450)
POTASSIUM SERPL-SCNC: 4.2 MMOL/L (ref 3.5–5.1)
PROT SERPL-MCNC: 7.8 G/DL (ref 5.7–8.2)
RBC # BLD AUTO: 4.61 X10(6)UL
SODIUM SERPL-SCNC: 143 MMOL/L (ref 136–145)
TSI SER-ACNC: 2.32 MIU/ML (ref 0.55–4.78)
WBC # BLD AUTO: 7.8 X10(3) UL (ref 4–11)

## 2024-09-07 PROCEDURE — 84443 ASSAY THYROID STIM HORMONE: CPT

## 2024-09-07 PROCEDURE — 85025 COMPLETE CBC W/AUTO DIFF WBC: CPT

## 2024-09-07 PROCEDURE — 36415 COLL VENOUS BLD VENIPUNCTURE: CPT

## 2024-09-07 PROCEDURE — 80053 COMPREHEN METABOLIC PANEL: CPT

## 2024-09-08 NOTE — PROGRESS NOTES
Subjective:     Patient ID: Beronica Hernandez is a 89 year old female.  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  She also complains of increasing fatigue lately, poor appetite, loss of sense of smell and taste.  Continues to live with it pain in the left chest wall for several years, states that she has seen a specialist tried multiple medications and blocks and nobody could help her.  Last advise was by  to put stimulator which she refused.  States that after she eats develops pain right away in the left side of the chest.  Takes lorazepam at night it helps her, meclizine does not help her with dizziness, she could not tolerate small doses of gabapentin because of mental status changes, tramadol is not helpful.  She is very discouraged with her situation since nobody can help her, she is living by herself, does not want to move with her family, states nobody has time for her    Current Outpatient Medications   Medication Sig Dispense Refill    DULoxetine 20 MG Oral Cap DR Particles Take 1 capsule (20 mg total) by mouth daily. 90 capsule 0    pantoprazole 40 MG Oral Tab EC Take 1 tablet (40 mg total) by mouth every morning before breakfast. 90 tablet 0    LORazepam 0.5 MG Oral Tab Take 1 tablet (0.5 mg total) by mouth nightly. 90 tablet 0    meclizine 25 MG Oral Tab Take 1 tablet (25 mg total) by mouth 2 (two) times daily as needed.      gabapentin 100 MG Oral Cap Take  1tab po daily in and for 1 week increase to 1 tab po twice a day 30 capsule 0    traMADol 50 MG Oral Tab Take  1tab po twice a day 30 tablet 1     Allergies:  Allergies   Allergen Reactions    Penicillins HALLUCINATION    Lyrica [Pregabalin] RASH    Bactrim  Ds RASH    Norco [Hydrocodone-Acetaminophen] RASH     1/16/23 Currently taking Norco prn without any side effects       Past Medical History:    Actinic keratosis    right ala    Actinic keratosis    right nasal dorsum    Brain tumor (HCC)    Hyperlipidemia    Meningioma (HCC)    Skin cancer    left hand      Past Surgical History:   Procedure Laterality Date    Appendectomy      Cyst aspiration left      breast    Hysterectomy      TVH due to prolapse    Knee scope,diagnostic      right knee    Other surgical history Left 2013    Excision of skin cancer with grafting      Family History   Problem Relation Age of Onset    Diabetes Mother       Social History:   Social History     Socioeconomic History    Marital status:    Tobacco Use    Smoking status: Never     Passive exposure: Never    Smokeless tobacco: Never   Vaping Use    Vaping status: Never Used   Substance and Sexual Activity    Alcohol use: Not Currently     Comment: 1 beer per month    Drug use: No   Other Topics Concern    Caffeine Concern No     Comment: 2 cups coffee daily    Exercise Yes     Comment: walking    Breast feeding No    Reaction to local anesthetic No    Pt has a pacemaker No    Pt has a defibrillator No   Social History Narrative    The patient does not use an assistive device..      The patient does live in a home with stairs.        /78   Pulse 80   Ht 5' 3\" (1.6 m)   Wt 137 lb (62.1 kg)   BMI 24.27 kg/m²    Physical Exam  Constitutional:       General: She is not in acute distress.     Appearance: Normal appearance.   Eyes:      Extraocular Movements: Extraocular movements intact.      Conjunctiva/sclera: Conjunctivae normal.      Pupils: Pupils are equal, round, and reactive to light.   Cardiovascular:      Rate and Rhythm: Normal rate and regular rhythm.      Heart sounds: No murmur heard.  Musculoskeletal:         General: Normal range of motion.      Cervical back: Normal range of motion and neck supple.       Right lower leg: No edema.      Left lower leg: No edema.   Skin:     General: Skin is warm.      Coloration: Skin is not jaundiced.   Neurological:      General: No focal deficit present.      Mental Status: She is alert and oriented to person, place, and time. Mental status is at baseline.   Psychiatric:         Attention and Perception: Attention normal.         Mood and Affect: Mood is depressed. Affect is tearful.         Speech: Speech normal.         Thought Content: Thought content does not include suicidal ideation.         Cognition and Memory: Cognition and memory normal.         Assessment & Plan:   1. Balance disorder multifactorial, advised patient to use assistive devices cane or walker, she states that she is doing well when she is driving as long as she is not trying to walk     2. Other fatigue will check CBC CMP TSH with reflex   3. Neuralgia will try duloxetine 20 mg daily with food, discussed side effects, follow-up in 1 month, report if cannot tolerate medication, advised that medication strength may be gradually increased       Orders Placed This Encounter   Procedures    CBC With Differential With Platelet    Comp Metabolic Panel (14)    TSH W Reflex To Free T4       Meds This Visit:  Requested Prescriptions     Signed Prescriptions Disp Refills    DULoxetine 20 MG Oral Cap DR Particles 90 capsule 0     Sig: Take 1 capsule (20 mg total) by mouth daily.    pantoprazole 40 MG Oral Tab EC 90 tablet 0     Sig: Take 1 tablet (40 mg total) by mouth every morning before breakfast.       Imaging & Referrals:  OP REFERRAL PAIN MANGEMENT

## 2024-09-12 ENCOUNTER — TELEPHONE (OUTPATIENT)
Dept: INTERNAL MEDICINE CLINIC | Facility: CLINIC | Age: 89
End: 2024-09-12

## 2024-09-13 ENCOUNTER — TELEPHONE (OUTPATIENT)
Dept: INTERNAL MEDICINE CLINIC | Facility: CLINIC | Age: 89
End: 2024-09-13

## 2024-09-13 NOTE — TELEPHONE ENCOUNTER
Patient called, verified Name and . States she takes pantoprazole 40 mg one tablet in the morning. Duloxetine 20 mg once daily, usually taken in the afternoon. States both medications are expensive and insurance not covering, but okay with her if medications will help her.    Dr. Omayra GARCIA.

## 2024-11-18 ENCOUNTER — OFFICE VISIT (OUTPATIENT)
Dept: INTERNAL MEDICINE CLINIC | Facility: CLINIC | Age: 89
End: 2024-11-18
Payer: MEDICARE

## 2024-11-18 VITALS
HEART RATE: 78 BPM | SYSTOLIC BLOOD PRESSURE: 136 MMHG | DIASTOLIC BLOOD PRESSURE: 75 MMHG | BODY MASS INDEX: 25.04 KG/M2 | WEIGHT: 141.31 LBS | HEIGHT: 63 IN

## 2024-11-18 DIAGNOSIS — Z91.81 AT RISK FOR FALLS: ICD-10-CM

## 2024-11-18 DIAGNOSIS — D32.0 MENINGIOMA, CEREBRAL (HCC): ICD-10-CM

## 2024-11-18 DIAGNOSIS — R43.2 TASTE PERVERSION: ICD-10-CM

## 2024-11-18 DIAGNOSIS — G89.4 CHRONIC PAIN SYNDROME: ICD-10-CM

## 2024-11-18 DIAGNOSIS — Z00.00 MEDICARE ANNUAL WELLNESS VISIT, SUBSEQUENT: Primary | ICD-10-CM

## 2024-11-18 DIAGNOSIS — R27.0 ATAXIA: ICD-10-CM

## 2024-11-18 PROCEDURE — G0439 PPPS, SUBSEQ VISIT: HCPCS | Performed by: INTERNAL MEDICINE

## 2024-11-18 RX ORDER — DULOXETIN HYDROCHLORIDE 20 MG/1
CAPSULE, DELAYED RELEASE ORAL
Qty: 180 CAPSULE | Refills: 0 | Status: SHIPPED | OUTPATIENT
Start: 2024-11-18

## 2024-11-22 DIAGNOSIS — F41.9 ANXIETY: ICD-10-CM

## 2024-11-24 NOTE — PROGRESS NOTES
Subjective:   Beronica Hernandez is a 89 year old female who presents for a Medicare Subsequent Annual Wellness visit (Pt already had Initial Annual Wellness) and scheduled follow up of multiple significant but stable problems.     Patient continues to complain of dizziness and balance problem, reports no falls, walks with a cane now.  She has history of cervical myelopathy not a surgical candidate due to age, history of meningioma s/p radiation treatment  Complains of having no appetite of taste or smell for a long time.  Continues to live by herself, just past 's test, states went she is comfortable driving has no dizziness when she drives  Does not want to live with the family states everyone is busy his own life daughter visits her once a week  Chronic pain in the left side of the chest failed multiple treatments with the pain management  History/Other:   Fall Risk Assessment:   She has been screened for Falls and is High Risk. Fall Prevention information provided to patient in After Visit Summary.    Do you feel unsteady when standing or walking?: Yes  Do you worry about falling?: Yes  Have you fallen in the past year?: No     Cognitive Assessment:   She had a completely normal cognitive assessment - see flowsheet entries     Functional Ability/Status:   Beronica Hernandez has some abnormal functions as listed below:  She has Vision problems based on screening of functional status. She has Walking problems based on screening of functional status.       Depression Screening (PHQ):  PHQ-2 SCORE: 0  , done 11/18/2024     Advanced Directives:   She does NOT have a Living Will. [Do you have a living will?: No]  She does NOT have a Power of  for Health Care. [Do you have a healthcare power of ?: No]      Patient Active Problem List   Diagnosis    Hand weakness    Ulnar neuropathy bilaterally    Left-sided chest wall pain    Shoulder weakness of the left scapular stabilizers    Myalgia of left  serratus anterior muscle    Meningioma, cerebral (HCC)     Allergies:  She is allergic to penicillins, lyrica [pregabalin], bactrim ds, and norco [hydrocodone-acetaminophen].    Current Medications:  Outpatient Medications Marked as Taking for the 11/18/24 encounter (Office Visit) with Carole Cutler MD   Medication Sig    DULoxetine 20 MG Oral Cap DR Particles Take  2 tab po daily    pantoprazole 40 MG Oral Tab EC Take 1 tablet (40 mg total) by mouth every morning before breakfast.    LORazepam 0.5 MG Oral Tab Take 1 tablet (0.5 mg total) by mouth nightly.    traMADol 50 MG Oral Tab Take  1tab po twice a day       Medical History:  She  has a past medical history of Actinic keratosis (2023), Actinic keratosis (2023), Brain tumor (HCC), Hyperlipidemia, Meningioma (HCC), and Skin cancer.  Surgical History:  She  has a past surgical history that includes cyst aspiration left; appendectomy; knee scope,diagnostic; hysterectomy; and other surgical history (Left, 2013).   Family History:  Her family history includes Diabetes in her mother.  Social History:  She  reports that she has never smoked. She has never been exposed to tobacco smoke. She has never used smokeless tobacco. She reports that she does not currently use alcohol. She reports that she does not use drugs.    Tobacco:  She has never smoked tobacco.    CAGE Alcohol Screen:   CAGE screening score of 0 on 11/18/2024, showing low risk of alcohol abuse.      Patient Care Team:  Carole Cutler MD as PCP - General (Internal Medicine)  Kurt Morton MD (Physical Medicine)  Mc Mendez MD (Radiation Oncology)  Rain Andrea MD (Radiation Oncology)  Abraham Turner MD (Radiation Oncology)          Physical Exam  Constitutional:       Appearance: Normal appearance.   Neck:      Vascular: No carotid bruit.   Cardiovascular:      Rate and Rhythm: Normal rate and regular rhythm.      Heart sounds: No murmur heard.  Pulmonary:      Effort: Pulmonary effort is  normal.      Breath sounds: No wheezing or rhonchi.   Musculoskeletal:         General: Normal range of motion.      Cervical back: Normal range of motion and neck supple.      Right lower leg: No edema.      Left lower leg: No edema.   Lymphadenopathy:      Cervical: No cervical adenopathy.   Neurological:      General: No focal deficit present.      Mental Status: She is alert and oriented to person, place, and time.      Gait: Gait abnormal (Steady gait uses cane).   Psychiatric:         Mood and Affect: Mood normal.         Behavior: Behavior normal.         Thought Content: Thought content normal.           /75 (BP Location: Left arm, Patient Position: Sitting, Cuff Size: adult)   Pulse 78   Ht 5' 3\" (1.6 m)   Wt 141 lb 4.8 oz (64.1 kg)   BMI 25.03 kg/m²  Estimated body mass index is 25.03 kg/m² as calculated from the following:    Height as of this encounter: 5' 3\" (1.6 m).    Weight as of this encounter: 141 lb 4.8 oz (64.1 kg).    Medicare Hearing Assessment:   Hearing Screening    Time taken: 11/18/2024  1:19 PM  Entry User: Violette Yin MA  Screening Method: Finger Rub  Finger Rub Result: Pass         Visual Acuity:   Right Eye Visual Acuity: Corrected Right Eye Chart Acuity: 20/40   Left Eye Visual Acuity: Corrected Left Eye Chart Acuity: 20/40   Both Eyes Visual Acuity: Corrected Both Eyes Chart Acuity: 20/30   Able To Tolerate Visual Acuity: Yes        Assessment & Plan:   Beronica Hernandez is a 89 year old female who presents for a Medicare Assessment.     1. Medicare annual wellness visit, subsequent (Primary)  2. At risk for falls advised patient to continue to use cane at all times or even walker  3. Meningioma, cerebral (HCC) stable clinically no treatment needed  4. Chronic pain syndrome uncontrolled will increase duloxetine take 40 mg daily, follow-up in 1 month report if any side effects  5. Taste perversion stable no treatment available  6. Ataxia patient declined physical  therapy  Other orders  -     DULoxetine HCl; Take  2 tab po daily  Dispense: 180 capsule; Refill: 0    The patient indicates understanding of these issues and agrees to the plan.    Follow-up in 1 month  Carole Cutler MD, 11/23/2024     Supplementary Documentation:   General Health:  In the past six months, have you lost more than 10 pounds without trying?: 2 - No  Has your appetite been poor?: Yes  Type of Diet: Low Carb  How does the patient maintain a good energy level?: Other  How would you describe your daily physical activity?: Moderate  How would you describe your current health state?: Fair  How do you maintain positive mental well-being?: Visiting Family;Visiting Friends  On a scale of 0 to 10, with 0 being no pain and 10 being severe pain, what is your pain level?: 8 - (Severe)  In the past six months, have you experienced urine leakage?: 1-Yes  At any time do you feel concerned for the safety/well-being of yourself and/or your children, in your home or elsewhere?: No  Have you had any immunizations at another office such as Influenza, Hepatitis B, Tetanus, or Pneumococcal?: Yes    Health Maintenance   Topic Date Due    Zoster Vaccines (2 of 3) 05/09/2016    COVID-19 Vaccine (7 - 2024-25 season) 09/01/2024    Influenza Vaccine (1) 10/01/2024    Annual Physical  10/30/2024    Annual Depression Screening  Completed    Fall Risk Screening (Annual)  Completed    Pneumococcal Vaccine: 65+ Years  Completed

## 2024-11-27 RX ORDER — LORAZEPAM 0.5 MG/1
0.5 TABLET ORAL NIGHTLY
Qty: 90 TABLET | Refills: 1 | Status: SHIPPED | OUTPATIENT
Start: 2024-11-27

## 2024-11-27 NOTE — TELEPHONE ENCOUNTER
Dr. Cutler, please kindly review; protocol failed  Medication request is marked high priority: patient states she is completely out and would like this sent right away.    No future appointments with family medicine/internal medicine.  Last office visit: 11/18/24 ([facility] Well Exam)    Recent fill dates: 8/28/24  Date of  last written prescription:      Prescription written on 8/24/24 for qty of: #90 each (processed as a 90 day supply)    Requested Prescriptions   Pending Prescriptions Disp Refills    LORAZEPAM 0.5 MG Oral Tab [Pharmacy Med Name: Lorazepam 0.5 Mg Tab Auro] 90 tablet 0     Sig: Take 1 tablet (0.5 mg total) by mouth nightly.       Controlled Substance Medication Failed - 11/27/2024  9:57 AM        Failed - This medication is a controlled substance - forward to provider to refill             Future Appointments         Provider Department Appt Notes    In 1 month Anne Marie Orlando MD [facility] skin check          Recent Outpatient Visits              1 week ago Medicare annual wellness visit, subsequent    [facility] [place] Kandel, Ninel, MD    Office Visit    2 months ago Balance disorder    [facility], [place] Kandel, Ninel, MD    Office Visit    5 months ago Dizziness    [facility], [place] Kandel, Ninel, MD    Office Visit    7 months ago Neuralgia    [facility], [place] Kandel, Ninel, MD    Office Visit    8 months ago Dizziness    [facility], [place] Kandel, Ninel, MD    Office Visit

## 2024-11-27 NOTE — TELEPHONE ENCOUNTER
Incoming call from [place] checking on status of lorazepam.   Informed we are waiting for provider to respond.

## 2024-11-27 NOTE — TELEPHONE ENCOUNTER
Patient is out of Lorazepam.    She wants Dr Cutler to send it right away to:  [facility]    Patient cannot sleep w/out it.

## 2024-11-29 DIAGNOSIS — F41.9 ANXIETY: ICD-10-CM

## 2024-11-29 RX ORDER — LORAZEPAM 0.5 MG/1
0.5 TABLET ORAL NIGHTLY
Qty: 90 TABLET | Refills: 0 | OUTPATIENT
Start: 2024-11-29

## 2024-11-29 NOTE — TELEPHONE ENCOUNTER
Patient called and stated that she spoke with a nurse earlier and was informed that her lorazepam was already sent to her pharmacy.   She called [facility] and was told that they have not received any prescription yet.     According to our records, the lorazepam was sent electronically on 11/27/24.   The registered nurse will contact [facility] and provide an update.   I called [facility] and spoke with Lukas (pharmacist) who confirmed that they have not received any lorazepam prescription.   The registered nurse provided a phone order.     The registered nurse updated the patient.

## 2024-11-29 NOTE — TELEPHONE ENCOUNTER
See 11/22/24 refill request encounter, already addressed .     DUPLICATE REQUEST ===refill denied.

## 2025-01-13 ENCOUNTER — OFFICE VISIT (OUTPATIENT)
Dept: DERMATOLOGY CLINIC | Facility: CLINIC | Age: OVER 89
End: 2025-01-13

## 2025-01-13 DIAGNOSIS — Z08 ENCOUNTER FOR FOLLOW-UP SURVEILLANCE OF SKIN CANCER: ICD-10-CM

## 2025-01-13 DIAGNOSIS — M79.645 PAIN IN FINGER OF LEFT HAND: ICD-10-CM

## 2025-01-13 DIAGNOSIS — Z85.828 ENCOUNTER FOR FOLLOW-UP SURVEILLANCE OF SKIN CANCER: ICD-10-CM

## 2025-01-13 DIAGNOSIS — D23.9 BENIGN NEOPLASM OF SKIN, UNSPECIFIED LOCATION: ICD-10-CM

## 2025-01-13 DIAGNOSIS — L81.4 LENTIGO: ICD-10-CM

## 2025-01-13 DIAGNOSIS — L57.0 AK (ACTINIC KERATOSIS): Primary | ICD-10-CM

## 2025-01-13 DIAGNOSIS — L82.1 SEBORRHEIC KERATOSES: ICD-10-CM

## 2025-01-13 DIAGNOSIS — D22.9 MULTIPLE NEVI: ICD-10-CM

## 2025-01-13 PROCEDURE — 99213 OFFICE O/P EST LOW 20 MIN: CPT | Performed by: DERMATOLOGY

## 2025-01-13 PROCEDURE — 17003 DESTRUCT PREMALG LES 2-14: CPT | Performed by: DERMATOLOGY

## 2025-01-13 PROCEDURE — 17000 DESTRUCT PREMALG LESION: CPT | Performed by: DERMATOLOGY

## 2025-01-13 RX ORDER — FLUOROMETHOLONE 1 MG/ML
1 SUSPENSION/ DROPS OPHTHALMIC 2 TIMES DAILY
COMMUNITY
Start: 2024-07-30

## 2025-01-13 NOTE — PROGRESS NOTES
The following individual(s) verbally consented to be recorded using ambient AI listening technology and understand that they can each withdraw their consent to this listening technology at any point by asking the clinician to turn off or pause the recording: Beronica Hernandez

## 2025-01-17 ENCOUNTER — LAB ENCOUNTER (OUTPATIENT)
Dept: LAB | Age: OVER 89
End: 2025-01-17
Attending: INTERNAL MEDICINE
Payer: MEDICARE

## 2025-01-17 ENCOUNTER — OFFICE VISIT (OUTPATIENT)
Dept: INTERNAL MEDICINE CLINIC | Facility: CLINIC | Age: OVER 89
End: 2025-01-17

## 2025-01-17 VITALS
HEART RATE: 80 BPM | DIASTOLIC BLOOD PRESSURE: 75 MMHG | HEIGHT: 63 IN | BODY MASS INDEX: 25.48 KG/M2 | SYSTOLIC BLOOD PRESSURE: 136 MMHG | WEIGHT: 143.81 LBS

## 2025-01-17 DIAGNOSIS — E61.1 IRON DEFICIENCY: ICD-10-CM

## 2025-01-17 DIAGNOSIS — R43.8 BITTER TASTE: ICD-10-CM

## 2025-01-17 DIAGNOSIS — R43.8 BITTER TASTE: Primary | ICD-10-CM

## 2025-01-17 DIAGNOSIS — R07.89 OTHER CHEST PAIN: ICD-10-CM

## 2025-01-17 DIAGNOSIS — D32.0 MENINGIOMA, CEREBRAL (HCC): ICD-10-CM

## 2025-01-17 LAB
ALBUMIN SERPL-MCNC: 4.7 G/DL (ref 3.2–4.8)
ALBUMIN/GLOB SERPL: 1.4 {RATIO} (ref 1–2)
ALP LIVER SERPL-CCNC: 54 U/L
ALT SERPL-CCNC: 8 U/L
ANION GAP SERPL CALC-SCNC: 10 MMOL/L (ref 0–18)
AST SERPL-CCNC: 16 U/L (ref ?–34)
BASOPHILS # BLD AUTO: 0.03 X10(3) UL (ref 0–0.2)
BASOPHILS NFR BLD AUTO: 0.5 %
BILIRUB SERPL-MCNC: 0.7 MG/DL (ref 0.2–0.9)
BUN BLD-MCNC: 12 MG/DL (ref 9–23)
BUN/CREAT SERPL: 12.9 (ref 10–20)
CALCIUM BLD-MCNC: 10.4 MG/DL (ref 8.7–10.4)
CHLORIDE SERPL-SCNC: 105 MMOL/L (ref 98–112)
CO2 SERPL-SCNC: 28 MMOL/L (ref 21–32)
CREAT BLD-MCNC: 0.93 MG/DL
DEPRECATED HBV CORE AB SER IA-ACNC: 79 NG/ML
DEPRECATED RDW RBC AUTO: 40.6 FL (ref 35.1–46.3)
EGFRCR SERPLBLD CKD-EPI 2021: 58 ML/MIN/1.73M2 (ref 60–?)
EOSINOPHIL # BLD AUTO: 0.06 X10(3) UL (ref 0–0.7)
EOSINOPHIL NFR BLD AUTO: 1 %
ERYTHROCYTE [DISTWIDTH] IN BLOOD BY AUTOMATED COUNT: 12.7 % (ref 11–15)
FASTING STATUS PATIENT QL REPORTED: YES
GLOBULIN PLAS-MCNC: 3.3 G/DL (ref 2–3.5)
GLUCOSE BLD-MCNC: 99 MG/DL (ref 70–99)
HCT VFR BLD AUTO: 40.8 %
HGB BLD-MCNC: 13.5 G/DL
IMM GRANULOCYTES # BLD AUTO: 0.02 X10(3) UL (ref 0–1)
IMM GRANULOCYTES NFR BLD: 0.3 %
LYMPHOCYTES # BLD AUTO: 1.9 X10(3) UL (ref 1–4)
LYMPHOCYTES NFR BLD AUTO: 30.1 %
MCH RBC QN AUTO: 29.1 PG (ref 26–34)
MCHC RBC AUTO-ENTMCNC: 33.1 G/DL (ref 31–37)
MCV RBC AUTO: 87.9 FL
MONOCYTES # BLD AUTO: 0.53 X10(3) UL (ref 0.1–1)
MONOCYTES NFR BLD AUTO: 8.4 %
NEUTROPHILS # BLD AUTO: 3.77 X10 (3) UL (ref 1.5–7.7)
NEUTROPHILS # BLD AUTO: 3.77 X10(3) UL (ref 1.5–7.7)
NEUTROPHILS NFR BLD AUTO: 59.7 %
OSMOLALITY SERPL CALC.SUM OF ELEC: 296 MOSM/KG (ref 275–295)
PLATELET # BLD AUTO: 360 10(3)UL (ref 150–450)
POTASSIUM SERPL-SCNC: 4.1 MMOL/L (ref 3.5–5.1)
PROT SERPL-MCNC: 8 G/DL (ref 5.7–8.2)
RBC # BLD AUTO: 4.64 X10(6)UL
SODIUM SERPL-SCNC: 143 MMOL/L (ref 136–145)
WBC # BLD AUTO: 6.3 X10(3) UL (ref 4–11)

## 2025-01-17 PROCEDURE — 82728 ASSAY OF FERRITIN: CPT

## 2025-01-17 PROCEDURE — 99214 OFFICE O/P EST MOD 30 MIN: CPT | Performed by: INTERNAL MEDICINE

## 2025-01-17 PROCEDURE — 80053 COMPREHEN METABOLIC PANEL: CPT

## 2025-01-17 PROCEDURE — 85025 COMPLETE CBC W/AUTO DIFF WBC: CPT

## 2025-01-17 PROCEDURE — 36415 COLL VENOUS BLD VENIPUNCTURE: CPT

## 2025-01-17 RX ORDER — TRAMADOL HYDROCHLORIDE 50 MG/1
TABLET ORAL
Qty: 20 TABLET | Refills: 0 | Status: SHIPPED | OUTPATIENT
Start: 2025-01-17

## 2025-01-19 NOTE — PROGRESS NOTES
Beronica Hernandez is a 89 year old female.    CC:    Chief Complaint   Patient presents with    Lesion     Last office visit: 11/10/23  Type of check: Spots check  Lesions of concern: face and hands  Symptoms for lesions: None  Personal hx of skin cancer: Actinic Keratosis, Unknown type  Family hx of Skin cancer: None  Sun Screen use: No          HISTORY:    Past Medical History:    Actinic keratosis    right ala    Actinic keratosis    right nasal dorsum    Brain tumor (HCC)    Hyperlipidemia    Meningioma (HCC)    Skin cancer    left hand      Past Surgical History:   Procedure Laterality Date    Appendectomy      Cyst aspiration left      breast    Hysterectomy      TVH due to prolapse    Knee scope,diagnostic      right knee    Other surgical history Left 2013    Excision of skin cancer with grafting      Family History   Problem Relation Age of Onset    Diabetes Mother       Social History     Socioeconomic History    Marital status:    Tobacco Use    Smoking status: Never     Passive exposure: Never    Smokeless tobacco: Never   Vaping Use    Vaping status: Never Used   Substance and Sexual Activity    Alcohol use: Not Currently     Comment: 1 beer per month    Drug use: No   Other Topics Concern    Caffeine Concern No     Comment: 2 cups coffee daily    Exercise Yes     Comment: walking    Outdoor occupation No    Breast feeding No    Reaction to local anesthetic No    Pt has a pacemaker No    Pt has a defibrillator No   Social History Narrative    The patient does not use an assistive device..      The patient does live in a home with stairs.        Current Outpatient Medications   Medication Sig Dispense Refill    fluorometholone 0.1 % Ophthalmic Suspension Place 1 drop into both eyes 2 (two) times daily.      LORazepam 0.5 MG Oral Tab Take 1 tablet (0.5 mg total) by mouth nightly. 90 tablet 1    DULoxetine 20 MG Oral Cap DR Particles Take  2 tab po daily 180 capsule 0    pantoprazole 40 MG Oral  Tab EC Take 1 tablet (40 mg total) by mouth every morning before breakfast. 90 tablet 0    traMADol 50 MG Oral Tab Take  1tab po twice a day 30 tablet 1    traMADol 50 MG Oral Tab Take  1tab po  every 12 hours  as needed  for pain 20 tablet 0     Allergies:   Allergies[1]    Past Medical History:    Actinic keratosis    right ala    Actinic keratosis    right nasal dorsum    Brain tumor (HCC)    Hyperlipidemia    Meningioma (HCC)    Skin cancer    left hand     Past Surgical History:   Procedure Laterality Date    Appendectomy      Cyst aspiration left      breast    Hysterectomy      TVH due to prolapse    Knee scope,diagnostic      right knee    Other surgical history Left 2013    Excision of skin cancer with grafting     Social History     Socioeconomic History    Marital status:      Spouse name: Not on file    Number of children: Not on file    Years of education: Not on file    Highest education level: Not on file   Occupational History    Not on file   Tobacco Use    Smoking status: Never     Passive exposure: Never    Smokeless tobacco: Never   Vaping Use    Vaping status: Never Used   Substance and Sexual Activity    Alcohol use: Not Currently     Comment: 1 beer per month    Drug use: No    Sexual activity: Not on file   Other Topics Concern     Service Not Asked    Blood Transfusions Not Asked    Caffeine Concern No     Comment: 2 cups coffee daily    Occupational Exposure Not Asked    Hobby Hazards Not Asked    Sleep Concern Not Asked    Stress Concern Not Asked    Weight Concern Not Asked    Special Diet Not Asked    Back Care Not Asked    Exercise Yes     Comment: walking    Bike Helmet Not Asked    Seat Belt Not Asked    Self-Exams Not Asked    Grew up on a farm Not Asked    History of tanning Not Asked    Outdoor occupation No    Breast feeding No    Reaction to local anesthetic No    Pt has a pacemaker No    Pt has a defibrillator No   Social History Narrative    The patient does not  use an assistive device..      The patient does live in a home with stairs.     Social Drivers of Health     Financial Resource Strain: Not on file   Food Insecurity: Not on file   Transportation Needs: Not on file   Physical Activity: Not on file   Stress: Not on file   Social Connections: Not on file   Housing Stability: Not on file     Family History   Problem Relation Age of Onset    Diabetes Mother        HPI:     HPI:  Chief Complaint   Patient presents with    Lesion     Last office visit: 11/10/23  Type of check: Spots check  Lesions of concern: face and hands  Symptoms for lesions: None  Personal hx of skin cancer: Actinic Keratosis, Unknown type  Family hx of Skin cancer: None  Sun Screen use: No    Follow-up history of nonmelanoma skin cancer, AK post excision and graft of lesion left index finger extensive areas of sun damage    History of Present Illness  The patient, with a history of cancer and arthritis, presents with concerns about several skin spots. She reports a previous cancerous lesion on her left hand that was surgically removed, but the area continues to cause discomfort. She also notes a spot on her nose where she previously had cancer, but she does not believe it has recurred.    The patient also reports a spot on her left hand that was raised but has since flattened. She describes this spot as bothersome and aching. She also points out several other skin spots, which she jokingly refers to as \"leprosy,\" but these do not seem to cause significant distress.    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 She describes feeling like a \"vegetable\" since the treatment, with persistent symptoms including a metallic taste in her mouth and general shakiness. She also reports a constant bitter taste that extends down her throat.    The patient also mentions arthritis in her  fingers, which she acknowledges with a comment about her hands looking \"dead.\" She expresses concern about the pain in her left index finger, which she believes might be related to the previous surgical site. She reports that this pain is bothersome, particularly at night.    No personal  history of skin cancer    Patient presents with concerns above.    Patient has been in their usual state of health.     Past notes/ records and appropriate/relevant lab results including pathology and past body maps reviewed. Including outside notes/ PCP notes as appropriate. Updated and new information noted in current visit.     ROS:  Denies other relevant systemic complaints. See HPI.     History, medications, allergies reviewed as noted.    Allergies:  Penicillins, Lyrica [pregabalin], Bactrim ds, Norco [hydrocodone-acetaminophen], and Sulfamethoxazole w/trimethoprim      There were no vitals filed for this visit.    Physical Examination:     Well-developed well-nourished patient alert oriented in no acute distress.  Exam performed of appropriate involved areas    Physical Exam  MUSCULOSKELETAL: Left index finger shows no signs of cancer, presence of arthritis noted.  SKIN: Age spot and sunspot on cheek, both described as flat.    Multiple light to medium brown, well marginated, uniformly pigmented, macules and papules 6 mm and less scattered on exam. pigmented lesions examined with dermoscopy benign-appearing patterns.     Waxy tannish keratotic papules scattered, cherry-red vascular papules scattered.    See map today's date for lesions noted .  See assessment and plan below for specific lesions.    Otherwise remarkable for lesions as noted on map.    See A/P  below for additional information:    Assessment / plan:    Results  Procedure: Cryotherapy  Description: The lesion on the left index finger was frozen.  AK left cheek treated as well  Informed Consent: Risks, benefits, and alternatives were discussed. The patient chose  cryotherapy over excision.    RADIOLOGY  Radiation therapy: 28 sessions  MRI: Tumor detected    No orders of the defined types were placed in this encounter.      Meds & Refills for this Visit:  Requested Prescriptions      No prescriptions requested or ordered in this encounter         Encounter Diagnoses   Name Primary?    AK (actinic keratosis) Yes    Seborrheic keratoses     Multiple nevi     Lentigo     Benign neoplasm of skin, unspecified location     Encounter for follow-up surveillance of skin cancer     Pain in finger of left hand      Fall risk assessment:  Given the results of the fall risk questionnaire given today.   Suggest:   Make sure there is adequate lighting.  Eliminate throw rugs or possible sources of tripping & falling  Consider grab bars on the shower & toilet.  Hand rails on all stair cases  Ambulatory assistance devices such as cane or walker as indicate.  To ensure home safety measures.    Assessment & Plan  Erythematous scaling keratotic papules noted at sites noted on map  Actinic Keratoses.  Precancerous nature discussed. Sun protection, sunscreen/ blocks encouraged Lesions treated with cryo- .  Biopsy if not resolved.    Left cheek, left dorsal hand      Post-surgical pain and arthritis  Chronic pain and aching in the left index finger post-surgery for cancer, likely exacerbated by arthritis and possible nerve sensitivity. No signs of cancer recurrence. Discussed treatment option no obvious recurrence of skin cancer in this area monitor.- Monitor for changes in pain or new symptoms    History nonmelanoma skin cancer    No current signs of recurrence. Patient expresses concern but no clinical evidence of cancer.  - Continue regular monitoring for any new symptoms or changes    Radiation therapy  Completed radiation therapy for a tumor 18 months ago. Patient reports persistent metallic taste and shakiness. Discussed ongoing symptoms and need for continued follow-up with oncologist   Catalino.  - Continue follow-up with oncologist Dr. Bae  - Monitor for any new or worsening symptoms    Age spots  Multiple benign age spots on the skin, including shoulders, related to aging and sun exposure. Advised against cryotherapy for large age spots due to potential recurrence.  - Apply Vaseline or Aquaphor to age spots  - Avoid cryotherapy for large age spots    General Health Maintenance  Advised on general skin care and sun protection due to significant sun exposure history.  - Apply sunscreen regularly, especially on the left side of the face  - Avoid excessive sun exposure    Follow-up  - Schedule follow-up appointment with Dr. Bae in three months.    Benign-appearing nevi, no atypical features on dermoscopy reassurance given monitor.     Waxy tan keratotic papules lesions in areas of concern as noted reassurance given.  Benign nature discussed.  Possibility of cryo, alphahydroxy acids over-the-counter retinol's discussed.     No other susupicious lesions on todays  exam.    Please refer to map for specific lesions.  See additional diagnoses.  Pros cons of various therapies, risks benefits discussed.Pathophysiology, terapeutic options reviewed.  See  Medications in grid.  Instructions reviewed at length.    Benign nevi, seborrheic  keratoses, cherry angiomas:  Reassurance regarding other benign skin lesions.    Monitor for new or changing lesions. Signs and symptoms of skin cancer, ABCDE's of melanoma ( additional information available at AAD.org, skincancer.org) Encourage Sunscreen (broad-spectrum, ideally mineral-based-UVA/UVB -SPF 30 or higher) use encouraged, sun protection/sun protective clothing, self exams reviewed Followup as noted RTC ---routine checkup 6 mos -one year or p.r.n.    Encounter Times   Including precharting, reviewing chart, prior notes obtaining history: 10 minutes, medical exam :10 minutes, notes on body map, plan, counseling 10minutes My total time spent caring for the  patient on the day of the encounter: 30 minutes     The patient indicates understanding of these issues and agrees to the plan.  The patient is asked to return as noted in follow-up/ above.    This note was generated using Dragon voice recognition software.  Please contact me regarding any confusion resulting from errors in recognition..  Note to patient and family: The 21st Century Cures Act makes medical notes like these available to patients. However, be advised this is a medical document. It is intended as [id number] communication and monitoring of a patient's care needs. It is written in medical language and may contain abbreviations or verbiage that are unfamiliar. It may appear blunt or direct. Medical documents are intended to carry relevant information, facts as evident and the clinical opinion of the practitioner.       [1]   Allergies  Allergen Reactions    Penicillins HALLUCINATION    Lyrica [Pregabalin] RASH    Bactrim Ds RASH    Norco [Hydrocodone-Acetaminophen] RASH     1/16/23 Currently taking Norco prn without any side effects    Sulfamethoxazole W/Trimethoprim RASH

## 2025-01-24 NOTE — PROGRESS NOTES
Subjective:     Patient ID: Beronica Hernandez is a 89 year old female.  Presents for follow-up on dizziness, DTR and absent taste, chest pain    HPI  Patient continues to complain of dizziness and balance, unsteady gait, no headache, she has known moderate to severe cervical spinal stenosis not a surgical candidate.  She states that she has been using cane and walker especially at home when she has bad days.  Today forgot her cane in the car.  States that she feels comfortable driving drives only short distances.  Does not want to live with her family x 2 continue living independently.  Currently getting absent and B12 taste in the mouth  For several years now, but maintaining her weight, eats simple foods pretty much same frequently.  Complains that every time she eats develops pain in the left side of the chest and this is happening for a long time.  Was evaluated by multiple pain specialist in the past had blocks and try different medication without relief of the pain  She takes lorazepam at night to help herself to get some sleep.    Current Outpatient Medications   Medication Sig Dispense Refill    traMADol 50 MG Oral Tab Take  1tab po  every 12 hours  as needed  for pain 20 tablet 0    fluorometholone 0.1 % Ophthalmic Suspension Place 1 drop into both eyes 2 (two) times daily.      LORazepam 0.5 MG Oral Tab Take 1 tablet (0.5 mg total) by mouth nightly. 90 tablet 1    DULoxetine 20 MG Oral Cap DR Particles Take  2 tab po daily 180 capsule 0    pantoprazole 40 MG Oral Tab EC Take 1 tablet (40 mg total) by mouth every morning before breakfast. 90 tablet 0    traMADol 50 MG Oral Tab Take  1tab po twice a day 30 tablet 1     Allergies:Allergies[1]    Past Medical History:    Actinic keratosis    right ala    Actinic keratosis    right nasal dorsum    Brain tumor (HCC)    Hyperlipidemia    Meningioma (HCC)    Skin cancer    left hand      Past Surgical History:   Procedure Laterality Date    Appendectomy      Cyst  aspiration left      breast    Hysterectomy      TVH due to prolapse    Knee scope,diagnostic      right knee    Other surgical history Left 2013    Excision of skin cancer with grafting      Family History   Problem Relation Age of Onset    Diabetes Mother       Social History:   Social History     Socioeconomic History    Marital status:    Tobacco Use    Smoking status: Never     Passive exposure: Never    Smokeless tobacco: Never   Vaping Use    Vaping status: Never Used   Substance and Sexual Activity    Alcohol use: Not Currently     Comment: 1 beer per month    Drug use: No   Other Topics Concern    Caffeine Concern No     Comment: 2 cups coffee daily    Exercise Yes     Comment: walking    Outdoor occupation No    Breast feeding No    Reaction to local anesthetic No    Pt has a pacemaker No    Pt has a defibrillator No   Social History Narrative    The patient does not use an assistive device..      The patient does live in a home with stairs.        /75 (BP Location: Left arm, Patient Position: Sitting, Cuff Size: adult)   Pulse 80   Ht 5' 3\" (1.6 m)   Wt 143 lb 12.8 oz (65.2 kg)   BMI 25.47 kg/m²    Physical Exam  Constitutional:       Appearance: Normal appearance.   HENT:      Head: Normocephalic and atraumatic.   Eyes:      General: No scleral icterus.     Extraocular Movements: Extraocular movements intact.      Conjunctiva/sclera: Conjunctivae normal.      Pupils: Pupils are equal, round, and reactive to light.   Cardiovascular:      Rate and Rhythm: Normal rate and regular rhythm.      Heart sounds: No murmur heard.     No gallop.   Musculoskeletal:         General: Normal range of motion.      Cervical back: Normal range of motion and neck supple.      Right lower leg: No edema.      Left lower leg: No edema.   Skin:     General: Skin is warm.      Coloration: Skin is not jaundiced.   Neurological:      General: No focal deficit present.      Mental Status: She is alert and  oriented to person, place, and time. Mental status is at baseline.   Psychiatric:         Mood and Affect: Mood normal.         Behavior: Behavior normal.         Thought Content: Thought content normal.         Assessment & Plan:   1. Bitter taste check labs CBC CMP vitamin deficiency iron level, patient declined gastroenterology evaluation discussed that possibly needs EGD, referred patient to see gastroenterology she is not sure if she will do that   2. Other chest pain patient agreed to have CT scan of the thoracic spine and abdominal spine to evaluate his symptoms   3. Iron deficiency    4.       Insomnia continue lorazepam 0.5 mg at bedtime patient reports no side effects.  5.      Dizziness multifactorial due to cervical spinal stenosis history of meningioma status postradiation treatment once more reinforced importance of using walker at all times to prevent falls, so far she reports no falls  , States that would like to try tramadol again it was helpful in the past for pain, she was instructed not to take together with lorazepam      Meds This Visit:  Requested Prescriptions     Signed Prescriptions Disp Refills    traMADol 50 MG Oral Tab 20 tablet 0     Sig: Take  1tab po  every 12 hours  as needed  for pain       Imaging & Referrals:  GASTRO - INTERNAL  CT CHEST(CONTRAST ONLY) (CPT=71260)  CT ABDOMEN(CONTRAST ONLY) (CPT=74160)   Follow-up in 2 months         [1]   Allergies  Allergen Reactions    Penicillins HALLUCINATION    Lyrica [Pregabalin] RASH    Bactrim Ds RASH    Norco [Hydrocodone-Acetaminophen] RASH     1/16/23 Currently taking Norco prn without any side effects    Sulfamethoxazole W/Trimethoprim RASH

## 2025-03-12 ENCOUNTER — NURSE TRIAGE (OUTPATIENT)
Dept: INTERNAL MEDICINE CLINIC | Facility: CLINIC | Age: OVER 89
End: 2025-03-12

## 2025-03-12 NOTE — TELEPHONE ENCOUNTER
Action Requested: Summary for Provider     []  Critical Lab, Recommendations Needed  [] Need Additional Advice  []   FYI    []   Need Orders  [] Need Medications Sent to Pharmacy  []  Other     SUMMARY: Patient will go to urgent care.     Reason for call: Shoulder Pain  Onset: Data Unavailable    Patient fell on the sidewalk last week. She hurt her right shoulder. She can't move her shoulder or comb her hair. She denies hitting her head. There is no pain when she is not moving her arm but she feels pain on her shoulder when she moves it. Denies swelling, redness, fever, chest pain.     Reason for Disposition   Can't move injured shoulder at all    Protocols used: Shoulder Injury-A-[place]

## 2025-03-25 ENCOUNTER — TELEPHONE (OUTPATIENT)
Dept: INTERNAL MEDICINE CLINIC | Facility: CLINIC | Age: OVER 89
End: 2025-03-25

## 2025-03-25 NOTE — TELEPHONE ENCOUNTER
Please advise patient to go to immediate care center for evaluation in [place] downstairs so appropriate imaging can be ordered  or even go to emergency room she should not drive if she is in a lot of pain.  She may have fractures.  Please ask patient that we should call her daughter as well to let them know what happened.  Maybe call ambulance if it is appropriate

## 2025-03-25 NOTE — TELEPHONE ENCOUNTER
Dr. Cutler,    Patient called requesting imaging for her right shoulder and back.    She is aware her CT scan was for her chest and thoracic spine, but states her right shoulder and entire back has been hurting her since her fall.    Upon review of OV notes, there was nothing mentioned about a fall, however patient states she discussed this at the time of the appointment.    States she saw you on 01- and has a f/u scheduled for 03-.    Would like orders for imaging of R shoulder and entire back.  Does not want to make multiple trips to complete orders.     Can you please advise?    Thank you,   Flores GRIFFIN MA

## 2025-03-25 NOTE — TELEPHONE ENCOUNTER
Spoke with daughter and notified with understanding.  Daughter states she is not available to take her.  Nurse did recommend not driving if in considerable pain.  Daughter verbalized understanding, declines ambulance call at this time.  She will discuss with her mother.

## 2025-03-26 ENCOUNTER — APPOINTMENT (OUTPATIENT)
Dept: GENERAL RADIOLOGY | Age: OVER 89
End: 2025-03-26
Attending: STUDENT IN AN ORGANIZED HEALTH CARE EDUCATION/TRAINING PROGRAM
Payer: MEDICARE

## 2025-03-26 ENCOUNTER — HOSPITAL ENCOUNTER (OUTPATIENT)
Age: OVER 89
Discharge: HOME | End: 2025-03-26
Attending: STUDENT IN AN ORGANIZED HEALTH CARE EDUCATION/TRAINING PROGRAM
Payer: MEDICARE

## 2025-03-26 ENCOUNTER — TELEPHONE (OUTPATIENT)
Dept: ORTHOPEDICS CLINIC | Facility: CLINIC | Age: OVER 89
End: 2025-03-26

## 2025-03-26 ENCOUNTER — TELEPHONE (OUTPATIENT)
Dept: INTERNAL MEDICINE CLINIC | Facility: CLINIC | Age: OVER 89
End: 2025-03-26

## 2025-03-26 VITALS
RESPIRATION RATE: 16 BRPM | OXYGEN SATURATION: 100 % | DIASTOLIC BLOOD PRESSURE: 66 MMHG | HEART RATE: 86 BPM | TEMPERATURE: 98 F | SYSTOLIC BLOOD PRESSURE: 131 MMHG

## 2025-03-26 DIAGNOSIS — M54.9 BACK PAIN WITHOUT RADIATION: ICD-10-CM

## 2025-03-26 DIAGNOSIS — M25.511 ACUTE PAIN OF RIGHT SHOULDER: ICD-10-CM

## 2025-03-26 DIAGNOSIS — M19.90 ARTHRITIS: Primary | ICD-10-CM

## 2025-03-26 DIAGNOSIS — R07.81 RIB PAIN ON LEFT SIDE: ICD-10-CM

## 2025-03-26 PROCEDURE — 99213 OFFICE O/P EST LOW 20 MIN: CPT

## 2025-03-26 PROCEDURE — 72100 X-RAY EXAM L-S SPINE 2/3 VWS: CPT | Performed by: STUDENT IN AN ORGANIZED HEALTH CARE EDUCATION/TRAINING PROGRAM

## 2025-03-26 PROCEDURE — 72072 X-RAY EXAM THORAC SPINE 3VWS: CPT | Performed by: STUDENT IN AN ORGANIZED HEALTH CARE EDUCATION/TRAINING PROGRAM

## 2025-03-26 PROCEDURE — 99214 OFFICE O/P EST MOD 30 MIN: CPT

## 2025-03-26 PROCEDURE — 73030 X-RAY EXAM OF SHOULDER: CPT | Performed by: STUDENT IN AN ORGANIZED HEALTH CARE EDUCATION/TRAINING PROGRAM

## 2025-03-26 PROCEDURE — 71101 X-RAY EXAM UNILAT RIBS/CHEST: CPT | Performed by: STUDENT IN AN ORGANIZED HEALTH CARE EDUCATION/TRAINING PROGRAM

## 2025-03-26 NOTE — TELEPHONE ENCOUNTER
Spoke to patient. Advised of providers recommendations. Patient verbalized understanding. Offered to call for ambulance, patient states she is able to drive herself. No further questions at this time.

## 2025-03-26 NOTE — TELEPHONE ENCOUNTER
Page from registration states patient is at [place] center, looking for an Xray order.  Page would like to verify if Patient was supposed to go to Immediate Care or if Patient is supposed to have an Xray order.  Chart reviewed. Informed Patient was recommended be evaluated in Immediate Care.  Page verbalized understanding.    Carole Cutler MD       3/25/25 12:35 PM  Note     Please advise patient to go to immediate care center for evaluation in [place] downstairs so appropriate imaging can be ordered  or even go to emergency room she should not drive if she is in a lot of pain.  She may have fractures.  Please ask patient that we should call her daughter as well to let them know what happened.  Maybe call ambulance if it is appropriate

## 2025-03-26 NOTE — ED PROVIDER NOTES
Patient Seen in: [facility] [place]      History     Chief Complaint   Patient presents with    Fall    Arm or Hand Injury     Stated Complaint: Eveluation Right Shoulder,Xray    Subjective:   HPI      89-year-old female with past medical history of HLD, who presents with concern for left lower lateral rib pain, right shoulder pain pointing over the anterior lateral deltoid, and back pain pointing over the lower thoracic and upper lumbar pain.  She states she fell while standing on a chair to fix a clock above her sink and fell onto the sink a few weeks ago.  She did not hit her head or have LOC. She denies any difficulty breathing.  She denies any urinary symptoms.  She denies any abdominal pain.    Objective:     Past Medical History:    Actinic keratosis    right ala    Actinic keratosis    right nasal dorsum    Brain tumor (HCC)    Hyperlipidemia    Meningioma (HCC)    Skin cancer    left hand              Past Surgical History:   Procedure Laterality Date    Appendectomy      Cyst aspiration left      breast    Hysterectomy      TVH due to prolapse    Knee scope,diagnostic      right knee    Other surgical history Left 2013    Excision of skin cancer with grafting                Social History     Socioeconomic History    Marital status:    Tobacco Use    Smoking status: Never     Passive exposure: Never    Smokeless tobacco: Never   Vaping Use    Vaping status: Never Used   Substance and Sexual Activity    Alcohol use: Not Currently     Comment: 1 beer per month    Drug use: No   Other Topics Concern    Caffeine Concern No     Comment: 2 cups coffee daily    Exercise Yes     Comment: walking    Outdoor occupation No    Breast feeding No    Reaction to local anesthetic No    Pt has a pacemaker No    Pt has a defibrillator No   Social History Narrative    The patient does not use an assistive device..      The patient does live in a home with stairs.              Review of Systems    Positive for  stated complaint: Eveluation Right Shoulder,Xray  Other systems are as noted in HPI.  Constitutional and vital signs reviewed.      All other systems reviewed and negative except as noted above.    Physical Exam     ED Triage Vitals [03/26/25 1319]   /66   Pulse 86   Resp 16   Temp 97.9 °F (36.6 °C)   Temp src Oral   SpO2 100 %   O2 Device None (Room air)       Current Vitals:   Vital Signs  BP: 131/66  Pulse: 86  Resp: 16  Temp: 97.9 °F (36.6 °C)  Temp src: Oral    Oxygen Therapy  SpO2: 100 %  O2 Device: None (Room air)        Physical Exam      General: Awake, alert, comfortable on room air, in no distress, tolerating oral secretions, interactive  Pulmonary: Lungs CTA B, no wheezing, no recruitment, no conversational dyspnea  GI: Abdomen soft, nontender, nondistended, no rebound, no guarding, no hepatomegaly, no splenomegaly  Neuro: Symmetrical facial expressions on gross observation, patient has intact sensation and motor function of the B/L radial, median, and ulnar nerves  Musculoskeletal: 5/5 B/L  strength, TTP of the left  mid to lower lateral rib cage, no step-offs, no hematomas, TTP over the right acromioclavicular joint and lateral deltoid region, no step-offs of the B/L shoulder girdles, no squaring of the right shoulder, negative drop arm sign, but cannot abduct right upper extremity beyond 90 degrees 2/2 pain in the right shoulder, TTP over the lower thoracic and upper lumbar mid spine with no step-offs and TTP of the B/L paraspinals of this region, soft compartments of the right upper extremity, no TTP throughout the right hand, forearm, elbow, or humerus  HEENT: No periorbital edema or erythema  Skin: No erythema or rash or hematomas or skin changes or ecchymosis or overlying any of the areas where patient is reporting pain or tender on palpation  Psych: Normal mood, normal affect        ED Course   Copy of radiology report of patient's right shoulder x-ray:  Narrative  PROCEDURE: XR  SHOULDER, COMPLETE (MIN 2 VIEWS), RIGHT (CPT=73030)     COMPARISON: None.     INDICATIONS: Rt shoulder pain 3-4 weeks post fall.     TECHNIQUE: 3 views were obtained.       FINDINGS:  Bone mineralization is minimally diminished.     There is no acute fracture/dislocation.     There are moderate degenerative changes within right shoulder manifested by bony hypertrophy, articular space narrowing, and subarticular sclerosis.  These are most significant at the acromioclavicular articulation.     There is partial calcification of the rotator cuff.              Impression  CONCLUSION:     1. No acute fracture/dislocation.     2. Moderate degenerative changes within the right shoulder.             Dictated by (CST): Dakota Jorge MD on 3/26/2025 at 2:24 PM      Finalized by (CST): Dakota Jorge MD on 3/26/2025 at 2:25 PM              Exam Ended: 03/26/25 14:16 Last Resulted: 03/26/25 14:25       Copy of radiology report of patient's chest x-ray and left rib series:  Narrative  PROCEDURE: XR RIBS WITH CHEST (3 VIEWS), LEFT (CPT=71101)     COMPARISON: [facility], XR RIBS WITH CHEST, BILATERAL (GNF=45665), 11/06/2023, 2:24 PM.     INDICATIONS: Left axillary rib pain 3-4 weeks post fall.     TECHNIQUE:   Four views.       FINDINGS:     BONES: Normal.  No significant arthropathy or acute abnormality.    SOFT TISSUES: Negative.  No visible soft tissue swelling.    LUNGS: Normal    PLEURA: Normal.  OTHER: Negative.              Impression  CONCLUSION: No radiographic evidence of acute cardiopulmonary process or displaced left rib fracture.           Dictated by (CST): Dakota Jorge MD on 3/26/2025 at 2:22 PM      Finalized by (CST): Dakota Jorge MD on 3/26/2025 at 2:23 PM              Exam Ended: 03/26/25 14:16 Last Resulted: 03/26/25 14:24       Copy of radiology report of patient's thoracic spine x-ray:    Narrative  PROCEDURE: XR THORACIC SPINE (3 VIEWS) (CPT=72072)     COMPARISON: [facility] [place] [facility], XR THORACIC SPINE (3 VIEWS) (CPT=72072), 7/05/2022, 12:08 PM.     INDICATIONS: Thoracic and upper lumbar pain 3-4 weeks post fall.     TECHNIQUE: Thoracic spine radiographs (3 views)     FINDINGS:  There are 12 thoracic type vertebral bodies in gross alignment with no acute fracture/traumatic subluxation.     There are moderate degenerative changes within the mid/lower thoracic spine manifested by minimal bony hypertrophy/osteophyte formation, endplate sclerosis, and disc space narrowing.              Impression  CONCLUSION: Moderate degenerative changes within the thoracic spine.  No acute fracture or traumatic subluxation is identified.           Dictated by (CST): Dakota Jorge MD on 3/26/2025 at 2:25 PM      Finalized by (CST): Dakota Jorge MD on 3/26/2025 at 2:26 PM              Exam Ended: 03/26/25 14:16 Last Resulted: 03/26/25 14:26       Copy of radiology report of patient's lumbar spine x-ray:    Narrative  PROCEDURE: XR LUMBAR SPINE (MIN 2 VIEWS) (CPT=72100)     COMPARISON: [facility], X LUMBAR SPINE AP LAT OBLS, 4/06/2006, 1:44 PM.     INDICATIONS: Thoracic and upper lumbar pain 3-4 weeks post fall.     TECHNIQUE: Lumbar spine radiographs (2-3 views)       FINDINGS:        There are 5 lumbar type vertebral bodies.  The patient demonstrates a slight broad-based levoconvex scoliotic curvature of the thoracolumbar spine with apex at the T12-L1 level.     There is no acute fracture/traumatic subluxation.     There are moderate to severe degenerative changes within the lower thoracic and lumbar spine manifested by bony hypertrophy/osteophyte formation, endplate sclerosis, disc space narrowing, and facet hypertrophy.     There is a grade 1 retrolisthesis of L4 in relation to L3 (5.6 mm).  This is likely degenerative in etiology.     There are moderate atherosclerotic calcifications within the visualized abdominal aorta.                  Impression  CONCLUSION: Moderate to severe degenerative changes within the lumbar spine.  There is no underlying fracture or traumatic subluxation.           Dictated by (CST): Dakota Jorge MD on 3/26/2025 at 2:26 PM      Finalized by (CST): Dakota Jorge MD on 3/26/2025 at 2:28 PM              Exam Ended: 03/26/25 14:16 Last Resulted: 03/26/25 14:28       MDM   Patient is awake and alert, comfortable on room air, in no distress, lungs CTAB with no wheezing with no sign of acute bronchospasm, given reported fall/trauma and pain in all of the above regions, consider potential fracture versus sprain versus strain versus rotator cuff injury, no obvious signs of dislocation, she is neurovascular intact with soft compartments with no sign of compartment syndrome, no sign of shingles in any of the areas of pain and no skin changes overlying any of the areas of pain  -Per my personal review and interpretation of the patient's x-ray of the lumbar spine, thoracic spine, right shoulder, chest and rib series, I do not appreciate any fractures, no pneumothoraces, normal lung expansion, no effusions.  We discussed all radiology reports at bedside as these were printed and reviewed together at bedside.  We discussed all incidental findings including degenerative changes especially over the acromioclavicular joint as well as calcification of the rotator cuff.  We did discuss that x-ray imaging is limited to assessment of bones and cannot assess the tendons, ligaments, muscles, rotator cuff, and other structures which could be injured.  Therefore, patient was provided with information for orthopedics and did recommend follow-up for reassessment and for further recommendations.  -We discussed symptomatic management with rest, no heavy lifting, no straining, safe application of ice or heat packs, and over-the-counter Tylenol or ibuprofen if needed for pain control as long as no contraindications, she is already applying  over-the-counter Salonpas/lidocaine patches        Medical Decision Making  Amount and/or Complexity of Data Reviewed  Radiology: ordered and independent interpretation performed.    Risk  OTC drugs.        Disposition and Plan     Clinical Impression:  1. Arthritis    2. Acute pain of right shoulder    3. Rib pain on left side    4. Back pain without radiation         Disposition:  Discharge  3/26/2025  2:35 pm    Follow-up:  Carole Cutler MD  [address] [place] [id number] [phone]    In 3 days      Pinky Ramírez MD  [address],  [place] [id number] [phone]      Ortho for shoulder follow up          Medications Prescribed:  Discharge Medication List as of 3/26/2025  2:35 PM          None

## 2025-03-26 NOTE — TELEPHONE ENCOUNTER
Back Care and Preventing Injuries: Care Instructions  Overview     You can hurt your back doing many everyday activities: lifting a heavy box, bending down to garden, exercising at the gym, and even getting out of bed. But you can keep your back strong and healthy by doing some exercises. You also can follow a few tips for sitting, sleeping, and lifting to avoid hurting your back again. Talk to your doctor before you start an exercise program. Ask for help if you want to learn more about keeping your back healthy. Follow-up care is a key part of your treatment and safety. Be sure to make and go to all appointments, and call your doctor if you are having problems. It's also a good idea to know your test results and keep a list of the medicines you take. How can you care for yourself at home? Stay at a healthy weight to avoid strain on your lower back. Do not smoke. Smoking increases the risk of osteoporosis, which weakens the spine. If you need help quitting, talk to your doctor about stop-smoking programs and medicines. These can increase your chances of quitting for good. Make sure you sleep in a position that maintains your back's normal curves and on a mattress that feels comfortable. Sleep on your side with a pillow between your knees, or sleep on your back with a pillow under your knees. These positions can reduce strain on your back. When you get out of bed, lie on your side and bend both knees. Drop your feet over the edge of the bed as you push up with both arms. Scoot to the edge of the bed. Make sure your feet are in line with your rear end (buttocks), and then stand up. If you must stand for a long time, put one foot on a stool, ledge, or box. Exercise to strengthen your back and other muscles  Get at least 30 minutes of exercise on most days of the week. Walking is a good choice.  You also may want to do other activities, such as running, swimming, cycling, or playing tennis or team Patient is scheduled for right shoulder. Xrays in Epic. Please advise if imaging is needed.  Future Appointments   Date Time Provider Department Center   4/14/2025 10:40 AM Pinky Ramírez MD [facility] [place]

## 2025-03-26 NOTE — TELEPHONE ENCOUNTER
3/26/25 XR SHOULDER, COMPLETE (MIN 2 VIEWS)   FINDINGS:   Bone mineralization is minimally diminished.      There is no acute fracture/dislocation.      There are moderate degenerative changes within right shoulder manifested by bony hypertrophy, articular space narrowing, and subarticular sclerosis.  These are most significant at the acromioclavicular articulation.      There is partial calcification of the rotator cuff.

## 2025-03-26 NOTE — DISCHARGE INSTRUCTIONS
Your x-ray showed no broken bones.  However, x-ray imaging does show arthritis in the right shoulder as well as calcification of the rotator cuff which is the muscles with help with shoulder movement.  There is also arthritis throughout the spine.  I recommend follow-up with orthopedics regarding your right shoulder pain for reassessment and for further recommendations as x-ray imaging is limited to assessment of bones and cannot assess the tendons, ligaments, muscles, other structures which also could be injured.    You can apply heat over the low back/area of pain, 3 times a day, 10 minutes each time, with a barrier between the skin and the heat pack so as to prevent skin injury.    I recommend follow-up with your primary care physician for reassessment and for further recommendations.

## 2025-03-26 NOTE — ED INITIAL ASSESSMENT (HPI)
Patient arrives ambulatory with c/o right shoulder, left under armpit, and back pain x several weeks after she fell onto a sink while trying to change the batteries in a clock above the sink. Denies head injury, denies loc.

## 2025-03-28 ENCOUNTER — TELEPHONE (OUTPATIENT)
Dept: INTERNAL MEDICINE CLINIC | Facility: CLINIC | Age: OVER 89
End: 2025-03-28

## 2025-03-28 DIAGNOSIS — R07.89 OTHER CHEST PAIN: ICD-10-CM

## 2025-03-28 DIAGNOSIS — M79.2 NEURALGIA: ICD-10-CM

## 2025-03-28 RX ORDER — TRAMADOL HYDROCHLORIDE 50 MG/1
TABLET ORAL
Qty: 20 TABLET | Refills: 0 | Status: CANCELLED | OUTPATIENT
Start: 2025-03-28

## 2025-03-28 NOTE — TELEPHONE ENCOUNTER
Patient called back, verified Name and . Apologizes that she missed a few calls, doing laundry. Relayed Dr. Cutler's message below, verbalized understanding. No further questions or concerns at this time. Follow-up appointment with JOSÉ Kenney already canceled.

## 2025-03-28 NOTE — TELEPHONE ENCOUNTER
Left a detailed message to patient's home number listed. Advised to call us back and let us know she received the message and then, we can cancel her appointment with NEGRITO MARTIN she has scheduled for 3/31/25. There is no need to come to the office since Dr. Cutler sent Tramadol.

## 2025-03-28 NOTE — TELEPHONE ENCOUNTER
Patient had a fall and was seen in urgent care on 3/26/25. She will be seeing Tyrel Greenberg but until then she's requesting Tramadol for right shoulder pain. She fell onto a sink while trying to change the batteries in a clock above the sink.     Dr. Cutler I scheduled a follow up with ALBA MARTIN for 3/31/25 but patient would appreciate if she could have the medication prescribed and not have to come to another appointment if possible.    I have pended Tramadol per her chart. If you approve and send I will cancel the appointment with NEGRITO MARTIN.    Please advise, thanks    Review pended refill request as it does not fall under a protocol.    Last filled : 1/17/25  Last Office Visit: 1/17/2025

## 2025-03-28 NOTE — TELEPHONE ENCOUNTER
Left message to call back. Please transfer to triage. 1st attempt.  I have also sent patient a [employer] message to call us back.

## 2025-03-28 NOTE — TELEPHONE ENCOUNTER
Please let patient know I sent prescription for tramadol to the pharmacy, she can take extra strength Tylenol 650 mg every 6 hours as needed for pain as well

## 2025-03-28 NOTE — TELEPHONE ENCOUNTER
Advised her Daughter Taylor (on HIPAA) of the medication sent and canceled the appointment with ALBA MARTIN for 3/31/25

## 2025-04-14 ENCOUNTER — OFFICE VISIT (OUTPATIENT)
Dept: ORTHOPEDICS CLINIC | Facility: CLINIC | Age: OVER 89
End: 2025-04-14
Payer: MEDICARE

## 2025-04-14 VITALS — HEIGHT: 62 IN | WEIGHT: 143 LBS | BODY MASS INDEX: 26.31 KG/M2

## 2025-04-14 DIAGNOSIS — M75.31 CALCIFIC TENDINITIS OF RIGHT SHOULDER: Primary | ICD-10-CM

## 2025-04-14 DIAGNOSIS — M75.41 IMPINGEMENT SYNDROME OF RIGHT SHOULDER: ICD-10-CM

## 2025-04-14 DIAGNOSIS — M19.011 PRIMARY OSTEOARTHRITIS OF RIGHT SHOULDER: ICD-10-CM

## 2025-04-14 RX ORDER — TRIAMCINOLONE ACETONIDE 40 MG/ML
40 INJECTION, SUSPENSION INTRA-ARTICULAR; INTRAMUSCULAR ONCE
Status: COMPLETED | OUTPATIENT
Start: 2025-04-14 | End: 2025-04-14

## 2025-04-14 RX ADMIN — TRIAMCINOLONE ACETONIDE 40 MG: 40 INJECTION, SUSPENSION INTRA-ARTICULAR; INTRAMUSCULAR at 11:45:00

## 2025-04-14 NOTE — PROGRESS NOTES
[facility] New Consult    Chief Complaint   Patient presents with    Shoulder Pain     RIGHT SHOULDER       HPI: The patient is a 89 year old female referred for orthopaedic consultation by Dr. Carole Cutler with complaints of new onset right shoulder pain after slipping and falling on the driveway from a mechanical trip.  Overall, she relates normalized range of motion but difficulty with overhead lifting and reaching.  She relates that she typically hangs longer he on a close line in her backyard as the weather is now improving.  She is also experiencing some mild weakness although this is also slowly improving.  She relates no specific intervention other than tramadol intermittently to control her pain.    Past Medical History[1]  Past Surgical History[2]  Current Medications[3]  Allergies[4]  Family History[5]  Social History     Occupational History    Not on file   Tobacco Use    Smoking status: Never     Passive exposure: Never    Smokeless tobacco: Never   Vaping Use    Vaping status: Never Used   Substance and Sexual Activity    Alcohol use: Not Currently     Comment: 1 beer per month    Drug use: No    Sexual activity: Not on file        ROS:  Complete ROS reviewed by me and non-contributory to the chief complaint except as mentioned above.    Physical Exam:    Ht 5' 2\" (1.575 m)   Wt 143 lb (64.9 kg)   BMI 26.16 kg/m²   Constitutional: Well developed, well nourished 89 year old female  Psychological: NAD, alert and appropriate  Respiratory: Breathing comfortably on room air with RR of 10-14  Cardiac: Palpable distal pulses with pink warm extremities distally  Examination of the C-spine reveals no palpable tenderness and adequate active range of motion with minimal complaint.  Right shoulder reveals no visible swelling, discoloration or deformity.  Active range of motion generates pain through the impingement zone on forward elevation to approximately 150 degrees.  Similar findings  are noted on abduction through the impingement zone to 30.  External rotation is symmetrical at about 55 degrees bilaterally with internal rotation above the belt line.  Nicholson is painful anterior laterally with less pain on Speed's test and Steuben's test.  Biceps groove is minimally tender.  AC joint is mildly prominent minimally tender with adequate tolerance of crossarm adduction.  Rotator cuff strength is symmetrical and intact in all planes bilaterally.  No instability on sulcus or load-and-shift.  Hand, wrist and elbow range of motion is within acceptable limits.  Neurovascular status is intact on sensory, motor and perfusion assessment distally.    Imaging: Multiple views right shoulder personally viewed, demonstrating no acute fracture or dislocations.  Trace calcific deposits is seen in the subacromial space and posterior laterally at the greater tuberosity.  Mild to moderate degenerative changes seen at the glenohumeral and AC joints.    XR SHOULDER, COMPLETE (MIN 2 VIEWS), RIGHT (CPT=73030)  Result Date: 3/26/2025  CONCLUSION:   1. No acute fracture/dislocation.  2. Moderate degenerative changes within the right shoulder.     Dictated by (CST): Dakota Jorge MD on 3/26/2025 at 2:24 PM     Finalized by (CST): Dakota Jorge MD on 3/26/2025 at 2:25 PM              Assessment/Diagnoses:  Diagnoses and all orders for this visit:    Calcific tendinitis of right shoulder  -     DRAIN/INJECT LARGE JOINT/BURSA  -     triamcinolone acetonide (Kenalog-40) 40 MG/ML injection 40 mg    Impingement syndrome of right shoulder  -     DRAIN/INJECT LARGE JOINT/BURSA  -     triamcinolone acetonide (Kenalog-40) 40 MG/ML injection 40 mg    Primary osteoarthritis of right shoulder        Plan:  I reviewed imaging and exam findings with the patient.  Symptoms are consistent with calcific rotator cuff tendinitis and impingement.  Early arthritic changes are also noted.  Referencing the patient's imaging studies, I  explained the diagnosis, etiology and natural history of rotator cuff tendinitis and impingement.  We discussed initial treatment options including conservative care through activity modification, anti-inflammatory use and physical therapy.  Given the duration of symptoms and significant impact on the patient's activities of daily living and sleep, we also discussed the option for subacromial corticosteroid injection.  After reviewing the nature and risks, the patient elects to proceed.  If symptoms do not improve over the next 2 to 4 weeks, reassessment is advised for possible advanced imaging with an MRI.  If symptoms resolve and there is a desire for follow-up physical therapy, the patient was asked to contact our office for a formal prescription.  All questions were answered and the patient verbalized understanding and appreciation.  Follow-up if symptoms do not respond.    Shoulder Subacromial Injection Procedure:  After discussing risks, benefits and alternatives to subacromial injection including but not limited to needle infection, steroid flare, temporary elevation in blood sugars in diabetics or failed improvement, the patient gave written consent to proceed.  Using meticulous sterile technique, I injected 40 mg of Kenalog mixed with 2 cc of 1% Xylocaine and 2 cc of 0.5% Marcaine at the posterior portal site of the right shoulder to minimal resistance.  The patient tolerated this well and a Band-Aid was applied.  Instructions were given to contact us if the patient experiences any adverse effects.    Pinky Ramírez MD, [facility]  Orthopaedic Surgery   Sports Medicine/Knee and Shoulder  [facility]  t: [phone]  f: [phone]             This document was partially prepared using Dragon Medical voice recognition software.  Although every attempt is made to correct errors during dictation, discrepancies may still exist.           [1]   Past Medical History:    Actinic keratosis    right ala    Actinic keratosis    right nasal dorsum    Brain tumor (HCC)    Hyperlipidemia    Meningioma (HCC)    Skin cancer    left hand   [2]   Past Surgical History:  Procedure Laterality Date    Appendectomy      Cyst aspiration left      breast    Hysterectomy      TVH due to prolapse    Knee scope,diagnostic      right knee    Other surgical history Left 2013    Excision of skin cancer with grafting   [3]   Current Outpatient Medications   Medication Sig Dispense Refill    traMADol 50 MG Oral Tab TAKE 1 TAN PO EVERY 12 HOURS  FOR PAIN  NEEDED 20 tablet 0    fluorometholone 0.1 % Ophthalmic Suspension Place 1 drop into both eyes 2 (two) times daily.      LORazepam 0.5 MG Oral Tab Take 1 tablet (0.5 mg total) by mouth nightly. 90 tablet 1    DULoxetine 20 MG Oral Cap DR Particles Take  2 tab po daily 180 capsule 0    pantoprazole 40 MG Oral Tab EC Take 1 tablet (40 mg total) by mouth every morning before breakfast. 90 tablet 0   [4]   Allergies  Allergen Reactions    Penicillins HALLUCINATION    Lyrica [Pregabalin] RASH    Bactrim Ds RASH    Norco [Hydrocodone-Acetaminophen] RASH     1/16/23 Currently taking Norco prn without any side effects    Sulfamethoxazole W/Trimethoprim RASH   [5]   Family History  Problem Relation Age of Onset    Diabetes Mother

## 2025-04-23 ENCOUNTER — NURSE TRIAGE (OUTPATIENT)
Dept: INTERNAL MEDICINE CLINIC | Facility: CLINIC | Age: OVER 89
End: 2025-04-23

## 2025-04-23 NOTE — TELEPHONE ENCOUNTER
Discussed the importance of blood pressure control in the prevention of ocular complications. Scheduled appointment for Wednesday April 30 at 11:40 as per Dr Cutler's note below.  Left Detailed Voicemail  that Dr Cutler can see her next Wednesday at 11:40 AM and to call back our office to confirm. Office phone number provided with office telephone hours.   Future Appointments   Date Time Provider Department Center   4/30/2025 11:40 AM Carole Cutler MD [id number] [place]

## 2025-04-23 NOTE — TELEPHONE ENCOUNTER
Please reply to pool: EM RN TRIAGE  Action Requested: Summary for Provider     []  Critical Lab, Recommendations Needed  [x] Need Additional Advice  []   FYI    []   Need Orders  [] Need Medications Sent to Pharmacy  []  Other     SUMMARY: Patient contacts clinic requesting appointment only with Dr. Cutler.  She reports ongoing dizziness x several months.  Her eyes hurt.  Denies headache, numbness, tingling or extremity weakness.  Denies fever or congestive symptoms.  Denies nausea or vomiting.  Denies chest pain or shortness of breath. She inquires if it may be related to her medications.  No acute visits to offer patient.  Dr. Cutler please advise.     Reason for call: Dizziness  Onset: Data Unavailable                       Reason for Disposition   Taking a medicine that could cause dizziness (e.g., blood pressure medications, diuretics)   MILD dizziness (e.g., walking normally) and has NOT been evaluated by physician for this (Exception: Dizziness caused by heat exposure, sudden standing, or poor fluid intake.)    Protocols used: Dizziness-A-OH

## 2025-04-24 NOTE — TELEPHONE ENCOUNTER
No new recommendations except that I would like to see patient accompanied by her family for appointment on April 30.

## 2025-04-24 NOTE — TELEPHONE ENCOUNTER
Attempted to call the patients daughter to let her know to accompany her mom to the appointment, no answer. Left a voicemail to call the office back.

## 2025-04-24 NOTE — TELEPHONE ENCOUNTER
I called patient number and dtr Taylor picked up the line  Informed of below and she will let patient know of visit and she will be there.   Dtr has been encouraging patient to go to ER for evaluation but expresses patient has had this for a while now and that she is\"stubborn\" and will not go to ER.   Dtr does feel that symptoms are worsening since she has been seen for this last but is agreeable to call or have her to to ER if significant change or immediate concern prior to visit date and time.     Dtr Taylor will inform patient of visit time and date.     FYI to PCP - if any other recommendations or plan in the meanwhile can route back to triage to reach back out if needed.    Please reply to pool: EM RN TRIAGE

## 2025-04-25 NOTE — TELEPHONE ENCOUNTER
Patient's daughter Taylor calling back,verified name and date of birth.  Reviewed recommendation from Dr Cutler's 4/24/25 note below.  Diana verbalized understanding.  She can't get of work for  patient's appointment but  said that patient can call her during the office visit so she can talk to Dr Cutler.

## 2025-04-30 ENCOUNTER — TELEPHONE (OUTPATIENT)
Dept: INTERNAL MEDICINE CLINIC | Facility: CLINIC | Age: OVER 89
End: 2025-04-30

## 2025-04-30 ENCOUNTER — OFFICE VISIT (OUTPATIENT)
Dept: INTERNAL MEDICINE CLINIC | Facility: CLINIC | Age: OVER 89
End: 2025-04-30

## 2025-04-30 VITALS
WEIGHT: 147 LBS | HEART RATE: 79 BPM | BODY MASS INDEX: 27.05 KG/M2 | SYSTOLIC BLOOD PRESSURE: 138 MMHG | DIASTOLIC BLOOD PRESSURE: 76 MMHG | HEIGHT: 62 IN

## 2025-04-30 DIAGNOSIS — R07.89 LEFT-SIDED CHEST WALL PAIN: Primary | ICD-10-CM

## 2025-04-30 DIAGNOSIS — R27.0 ATAXIA: ICD-10-CM

## 2025-04-30 DIAGNOSIS — D32.0 MENINGIOMA, CEREBRAL (HCC): ICD-10-CM

## 2025-04-30 NOTE — PROGRESS NOTES
Subjective:     Patient ID: Beronica Hernandez is a 89 year old female presents for evaluation of several concerns    HPI  Patient seen in office today by herself, I spoke to the daughter Taylor by phone states that patient did not want her to come for appointment.  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  She states that she has daily pain when she eats pain travels to the left side of the chest.  She has failed multiple medication Lyrica, gabapentin for pain control, now takes 1 tramadol and 1 Tylenol but that is not helpful.  She reports that she gets about 6 hours of sleep at night if she takes lorazepam 0.5 mg she has done this for many years.  Complains of feeling dizzy and unsteady on her feet has not fallen.  History of brain meningioma treated with radiation therapy, advanced cervical spinal stenosis with myelopathy not a surgical candidate at age 89.  She was tried on duloxetine, Lexapro and mirtazapine without relief of symptoms of anxiety.  She is anxious about her condition, able to manage daily life on her own, does not want her family to help her.  Worries about her granddaughter who is alcoholic he has 2 young girls 10 years old, possibly will be on the street soon.  She is helping granddaughter financially what ever she can.    Current Medications[1]  Allergies:Allergies[2]    Past Medical History[3]   Past Surgical History[4]   Family History[5]   Social History: Short Social Hx on File[6]     /76 (BP Location: Left arm, Patient Position: Sitting, Cuff Size: adult)   Pulse 79   Ht 5' 2\" (1.575 m)   Wt 147 lb (66.7 kg)   BMI 26.89 kg/m²    Physical Exam  Constitutional:       Appearance: Normal appearance.   HENT:       Head: Normocephalic and atraumatic.   Cardiovascular:      Rate and Rhythm: Normal rate and regular rhythm.   Pulmonary:      Effort: Pulmonary effort is normal. No respiratory distress.      Breath sounds: No wheezing or rhonchi.   Abdominal:      General: Bowel sounds are normal.      Palpations: Abdomen is soft. There is no mass.      Tenderness: There is no abdominal tenderness. There is no guarding or rebound.   Musculoskeletal:         General: Normal range of motion.      Cervical back: Normal range of motion and neck supple.      Right lower leg: No edema.      Left lower leg: No edema.   Skin:     General: Skin is warm.      Coloration: Skin is not jaundiced.   Neurological:      General: No focal deficit present.      Mental Status: She is alert and oriented to person, place, and time. Mental status is at baseline.      Coordination: Coordination abnormal.      Gait: Gait abnormal.   Psychiatric:         Mood and Affect: Mood normal.         Behavior: Behavior normal.         Thought Content: Thought content normal.         Judgment: Judgment normal.         Assessment & Plan:   1. Left-sided chest wall pain CT scan of the chest with contrast continue tramadol   2. Ataxia multifactorial, due to myelopathy, status post radiation treatment of meningioma   Advised patient to use walker at all times.  She declined physical therapy      Meds This Visit:  Requested Prescriptions      No prescriptions requested or ordered in this encounter       Imaging & Referrals:  None            [1]   Current Outpatient Medications   Medication Sig Dispense Refill    traMADol 50 MG Oral Tab TAKE 1 TAN PO EVERY 12 HOURS  FOR PAIN  NEEDED 20 tablet 0    fluorometholone 0.1 % Ophthalmic Suspension Place 1 drop into both eyes 2 (two) times daily.      LORazepam 0.5 MG Oral Tab Take 1 tablet (0.5 mg total) by mouth nightly. 90 tablet 1   [2]   Allergies  Allergen Reactions    Penicillins HALLUCINATION    Lyrica [Pregabalin] RASH     Bactrim Ds RASH    Norco [Hydrocodone-Acetaminophen] RASH     1/16/23 Currently taking Norco prn without any side effects    Sulfamethoxazole W/Trimethoprim RASH   [3]   Past Medical History:   Actinic keratosis    right ala    Actinic keratosis    right nasal dorsum    Brain tumor (HCC)    Hyperlipidemia    Meningioma (HCC)    Skin cancer    left hand   [4]   Past Surgical History:  Procedure Laterality Date    Appendectomy      Cyst aspiration left      breast    Hysterectomy      TVH due to prolapse    Knee scope,diagnostic      right knee    Other surgical history Left 2013    Excision of skin cancer with grafting   [5]   Family History  Problem Relation Age of Onset    Diabetes Mother    [6]   Social History  Socioeconomic History    Marital status:    Tobacco Use    Smoking status: Never     Passive exposure: Never    Smokeless tobacco: Never   Vaping Use    Vaping status: Never Used   Substance and Sexual Activity    Alcohol use: Not Currently     Comment: 1 beer per month    Drug use: No   Other Topics Concern    Caffeine Concern No     Comment: 2 cups coffee daily    Exercise Yes     Comment: walking    Outdoor occupation No    Breast feeding No    Reaction to local anesthetic No    Pt has a pacemaker No    Pt has a defibrillator No   Social History Narrative    The patient does not use an assistive device..      The patient does live in a home with stairs.

## 2025-05-06 DIAGNOSIS — S40.019D: ICD-10-CM

## 2025-05-06 DIAGNOSIS — S40.029D: ICD-10-CM

## 2025-05-08 ENCOUNTER — TELEPHONE (OUTPATIENT)
Facility: CLINIC | Age: OVER 89
End: 2025-05-08

## 2025-05-08 NOTE — TELEPHONE ENCOUNTER
Pt rt shoulder is in severe pain and she wants to know if she should schedule appt. Please advise and f/u with pt [phone]  No future appointments.

## 2025-05-12 ENCOUNTER — OFFICE VISIT (OUTPATIENT)
Dept: ORTHOPEDICS CLINIC | Facility: CLINIC | Age: OVER 89
End: 2025-05-12
Payer: MEDICARE

## 2025-05-12 VITALS — HEIGHT: 62 IN | BODY MASS INDEX: 27.05 KG/M2 | WEIGHT: 147 LBS

## 2025-05-12 DIAGNOSIS — M75.41 IMPINGEMENT SYNDROME OF RIGHT SHOULDER: ICD-10-CM

## 2025-05-12 DIAGNOSIS — M19.011 PRIMARY OSTEOARTHRITIS OF RIGHT SHOULDER: Primary | ICD-10-CM

## 2025-05-12 DIAGNOSIS — M75.31 CALCIFIC TENDINITIS OF RIGHT SHOULDER: ICD-10-CM

## 2025-05-12 RX ORDER — TRAMADOL HYDROCHLORIDE 50 MG/1
50 TABLET ORAL EVERY 12 HOURS PRN
Qty: 20 TABLET | Refills: 0 | OUTPATIENT
Start: 2025-05-12

## 2025-05-12 RX ORDER — TRIAMCINOLONE ACETONIDE 40 MG/ML
40 INJECTION, SUSPENSION INTRA-ARTICULAR; INTRAMUSCULAR ONCE
Status: COMPLETED | OUTPATIENT
Start: 2025-05-12 | End: 2025-05-12

## 2025-05-12 RX ADMIN — TRIAMCINOLONE ACETONIDE 40 MG: 40 INJECTION, SUSPENSION INTRA-ARTICULAR; INTRAMUSCULAR at 13:43:00

## 2025-05-12 NOTE — PROGRESS NOTES
[facility] Note      Chief Complaint   Patient presents with    Shoulder Pain     RIGHT SHOULDER  -Pain has increased     HPI: The patient is a 89 year old female returning for reassessment of her painful right shoulder.  Symptoms began after slipping and falling on the driveway from a mechanical trip.  Overall, she relates normalized range of motion but difficulty with overhead lifting and reaching.  She relates that she typically hangs longer he on a close line in her backyard as the weather is now improving.  Tramadol is not providing notable relief in her last injection was beneficial up to 60% improvement but has not lasted.    Past Medical History[1]  Past Surgical History[2]  Current Medications[3]  Allergies[4]  Family History[5]  Social History     Occupational History    Not on file   Tobacco Use    Smoking status: Never     Passive exposure: Never    Smokeless tobacco: Never   Vaping Use    Vaping status: Never Used   Substance and Sexual Activity    Alcohol use: Not Currently     Comment: 1 beer per month    Drug use: No    Sexual activity: Not on file        ROS:  Complete ROS reviewed by me and non-contributory to the chief complaint except as mentioned above.    Physical Exam:    Ht 5' 2\" (1.575 m)   Wt 147 lb (66.7 kg)   BMI 26.89 kg/m²   Constitutional: Well developed, well nourished 89 year old female  Psychological: NAD, alert and appropriate  Respiratory: Breathing comfortably on room air with RR of 10-14  Cardiac: Palpable distal pulses with pink warm extremities distally  Examination of the C-spine reveals no palpable tenderness and adequate active range of motion with minimal complaint.  Right shoulder reveals no visible swelling, discoloration or deformity.  Active range of motion generates pain through the impingement zone on forward elevation to approximately 130 degrees.  Similar findings are noted on abduction through the impingement zone to 130.  External  rotation is about 45 degrees with internal rotation above the belt line.  [place] is painful anterior laterally with less pain on Speed's test and [place]'s test.  Biceps groove is minimally tender.  AC joint is mildly prominent minimally tender with adequate tolerance of crossarm adduction.  Rotator cuff strength is symmetrical and intact in all planes bilaterally.  No instability on sulcus or load-and-shift.  Hand, wrist and elbow range of motion is within acceptable limits.  Neurovascular status is intact on sensory, motor and perfusion assessment distally.    Imaging: Multiple views right shoulder personally viewed, demonstrating no acute fracture or dislocations.  Trace calcific deposits is seen in the subacromial space and posterior laterally at the greater tuberosity.  Mild to moderate degenerative changes seen at the glenohumeral and AC joints.    XR SHOULDER, COMPLETE (MIN 2 VIEWS), RIGHT (CPT=73030)  Result Date: 3/26/2025  CONCLUSION:   1. No acute fracture/dislocation.  2. Moderate degenerative changes within the right shoulder.     Dictated by (CST): Dakota Jorge MD on 3/26/2025 at 2:24 PM     Finalized by (CST): Dakota Jorge MD on 3/26/2025 at 2:25 PM              Assessment/Diagnoses:  Diagnoses and all orders for this visit:    Primary osteoarthritis of right shoulder  -     DRAIN/INJECT LARGE JOINT/BURSA  -     triamcinolone acetonide (Kenalog-40) 40 MG/ML injection 40 mg    Calcific tendinitis of right shoulder    Impingement syndrome of right shoulder      Plan:  I reviewed imaging and exam findings with the patient.  Symptoms are consistent with pain due to calcific rotator cuff tendinitis and impingement.  Early arthritic changes also may be contributing.  Given only brief and partial relief from recent subacromial injection I suggested a possible glenohumeral joint injection.  There are some moderate arthritic changes which may be more severe than the imaging studies depict.  She is  tearful and having difficulty with sleep.  Tramadol is also minimally effective.  I therefore offered her a corticosteroid injection mostly to the glenohumeral joint to see if we can get some additional relief.  After reviewing the nature and risks, the patient elects to proceed.  If symptoms do not improve over the next 2 to 4 weeks, reassessment is advised for possible advanced imaging with an MRI.  All questions were answered and the patient verbalized understanding and appreciation.  Follow-up if symptoms do not respond.    Shoulder Glenohumeral Injection Procedure:  After discussing risks, benefits and alternatives to injection including but not limited to needle infection, steroid flare or failed improvement, the patient gave written and verbal consent to proceed.  Using meticulous sterile technique and after attempted aspiration with a 22 gauge needle, the right shoulder was injected with 40 mg of Kenalog mixed with 4 cc of 1% Xylocaine at the anterior interval triangle to minimal resistance.  The patient tolerated this well and a Band-Aid was applied.  Instructions were given to contact us if the patient experiences any adverse effects.      Pinky Ramírez MD, [facility]  Orthopaedic Surgery   Sports Medicine/Knee and Shoulder  [facility]  t: [phone]  f: [phone]             This document was partially prepared using Dragon Medical voice recognition software.  Although every attempt is made to correct errors during dictation, discrepancies may still exist.           [1]   Past Medical History:   Actinic keratosis    right ala    Actinic keratosis    right nasal dorsum    Brain tumor (HCC)    Hyperlipidemia    Meningioma (HCC)    Skin cancer    left hand   [2]   Past Surgical History:  Procedure Laterality Date    Appendectomy      Cyst aspiration left      breast    Hysterectomy      TVH due to prolapse    Knee scope,diagnostic      right knee    Other surgical  history Left 2013    Excision of skin cancer with grafting   [3]   Current Outpatient Medications   Medication Sig Dispense Refill    fluorometholone 0.1 % Ophthalmic Suspension Place 1 drop into both eyes 2 (two) times daily.      LORazepam 0.5 MG Oral Tab Take 1 tablet (0.5 mg total) by mouth nightly. 90 tablet 1    traMADol 50 MG Oral Tab TAKE 1 TAN PO EVERY 12 HOURS  FOR PAIN  NEEDED (Patient not taking: Reported on 5/12/2025) 20 tablet 0   [4]   Allergies  Allergen Reactions    Penicillins HALLUCINATION    Lyrica [Pregabalin] RASH    Bactrim Ds RASH    Norco [Hydrocodone-Acetaminophen] RASH     1/16/23 Currently taking Norco prn without any side effects    Sulfamethoxazole W/Trimethoprim RASH   [5]   Family History  Problem Relation Age of Onset    Diabetes Mother

## 2025-05-14 ENCOUNTER — TELEPHONE (OUTPATIENT)
Dept: INTERNAL MEDICINE CLINIC | Facility: CLINIC | Age: OVER 89
End: 2025-05-14

## 2025-05-14 RX ORDER — TRAMADOL HYDROCHLORIDE 50 MG/1
TABLET ORAL
Qty: 20 TABLET | Refills: 0 | OUTPATIENT
Start: 2025-05-14

## 2025-05-22 ENCOUNTER — HOSPITAL ENCOUNTER (OUTPATIENT)
Age: OVER 89
Discharge: LEFT BEFORE DISCHARGE | End: 2025-05-22
Payer: MEDICARE

## 2025-05-22 VITALS
OXYGEN SATURATION: 98 % | SYSTOLIC BLOOD PRESSURE: 129 MMHG | TEMPERATURE: 98 F | HEART RATE: 75 BPM | DIASTOLIC BLOOD PRESSURE: 87 MMHG | RESPIRATION RATE: 17 BRPM

## 2025-05-22 DIAGNOSIS — R42 DIZZINESS: Primary | ICD-10-CM

## 2025-05-22 LAB
ATRIAL RATE: 73 BPM
P AXIS: 64 DEGREES
P-R INTERVAL: 188 MS
Q-T INTERVAL: 368 MS
QRS DURATION: 76 MS
QTC CALCULATION (BEZET): 405 MS
R AXIS: -42 DEGREES
T AXIS: 32 DEGREES
VENTRICULAR RATE: 73 BPM

## 2025-05-22 PROCEDURE — 99214 OFFICE O/P EST MOD 30 MIN: CPT

## 2025-05-22 PROCEDURE — 93005 ELECTROCARDIOGRAM TRACING: CPT

## 2025-05-22 NOTE — ED PROVIDER NOTES
Patient Seen in: [facility]  {Ambient consent attestation (Optional):09760}      History  Chief Complaint   Patient presents with    Dizziness     Stated Complaint: Dizzy,Left Side Pain    Subjective:   HPI            89-year-old female presents to immediate care with complaints of dizziness.  She states she started feeling dizzy 2 days ago and its gotten much worse.  She denies headache.  She denies recent head injury.  She does states she does have a history of having a brain tumor.  Patient states she has never had this kind of dizziness before.  She denies chest pain or feeling short of breath.      Objective:     Past Medical History:    Actinic keratosis    right ala    Actinic keratosis    right nasal dorsum    Brain tumor (HCC)    Hyperlipidemia    Meningioma (HCC)    Skin cancer    left hand              Past Surgical History:   Procedure Laterality Date    Appendectomy      Cyst aspiration left      breast    Hysterectomy      TVH due to prolapse    Knee scope,diagnostic      right knee    Other surgical history Left 2013    Excision of skin cancer with grafting                Social History     Socioeconomic History    Marital status:    Tobacco Use    Smoking status: Never     Passive exposure: Never    Smokeless tobacco: Never   Vaping Use    Vaping status: Never Used   Substance and Sexual Activity    Alcohol use: Not Currently     Comment: 1 beer per month    Drug use: No   Other Topics Concern    Caffeine Concern No     Comment: 2 cups coffee daily    Exercise Yes     Comment: walking    Outdoor occupation No    Breast feeding No    Reaction to local anesthetic No    Pt has a pacemaker No    Pt has a defibrillator No   Social History Narrative    The patient does not use an assistive device..      The patient does live in a home with stairs.              Review of Systems    Positive for stated complaint: Dizzy,Left Side Pain  Other systems are as noted in HPI.  Constitutional  and vital signs reviewed.      All other systems reviewed and negative except as noted above.                  Physical Exam    ED Triage Vitals [05/22/25 1449]   /87   Pulse 75   Resp 17   Temp 97.8 °F (36.6 °C)   Temp src Oral   SpO2 98 %   O2 Device None (Room air)       Current Vitals:   Vital Signs  BP: 129/87  Pulse: 75  Resp: 17  Temp: 97.8 °F (36.6 °C)  Temp src: Oral    Oxygen Therapy  SpO2: 98 %  O2 Device: None (Room air)            Physical Exam  Vitals reviewed.   Constitutional:       General: She is not in acute distress.     Appearance: She is not ill-appearing.   HENT:      Head: Atraumatic.   Eyes:      Extraocular Movements: Extraocular movements intact.      Right eye: No nystagmus.      Left eye: No nystagmus.      Pupils: Pupils are equal, round, and reactive to light.   Cardiovascular:      Rate and Rhythm: Normal rate and regular rhythm.   Pulmonary:      Effort: Pulmonary effort is normal.      Breath sounds: Normal breath sounds.   Musculoskeletal:         General: Normal range of motion.      Cervical back: Normal range of motion.   Skin:     General: Skin is warm and dry.   Neurological:      Mental Status: She is alert.   Psychiatric:         Mood and Affect: Mood normal.         Behavior: Behavior normal.                 ED Course  Labs Reviewed - No data to display  EKG      EKG 12 Lead        Component Value Flag Ref Range Units Status    Ventricular rate 73       BPM Incomplete    Atrial rate 73       BPM Incomplete    P-R Interval 188       ms Incomplete    QRS Duration 76       ms Incomplete    Q-T Interval 368       ms Incomplete    QTC Calculation (Bezet) 405       ms Incomplete    P Axis 64       degrees Incomplete    R Axis -42       degrees Incomplete    T Axis 32       degrees Incomplete                 Normal sinus rhythm  Left axis deviation  Abnormal ECG  When compared with ECG of 25-MAY-2024 15:51,  No significant change was found                                      MDM             Medical Decision Making  Amount and/or Complexity of Data Reviewed  ECG/medicine tests: ordered.     Details: EKG shows NSR rate is 73.  This is similar to prior in MUSE.          Disposition and Plan     Clinical Impression:  No diagnosis found.     Disposition:  There is no disposition on file for this visit.  There is no disposition time on file for this visit.    Follow-up:  No follow-up provider specified.        Medications Prescribed:  Current Discharge Medication List                Supplementary Documentation:

## 2025-05-22 NOTE — ED INITIAL ASSESSMENT (HPI)
Pt presents to the IC with c/o dizziness, significantly worse today. Pt denies feelings of chest pain or SOB. Pt notes a burning sensation to her left side for the last 1-2 month, worse recently. Hx of cutting a nerve on that side after removing a cyst. Pt tried taking tylenol as instructed by her PMD without improvement.

## 2025-05-27 DIAGNOSIS — F41.9 ANXIETY: ICD-10-CM

## 2025-05-27 RX ORDER — LORAZEPAM 0.5 MG/1
0.5 TABLET ORAL NIGHTLY
Qty: 90 TABLET | Refills: 1 | Status: SHIPPED | OUTPATIENT
Start: 2025-05-27

## 2025-05-27 NOTE — TELEPHONE ENCOUNTER
Please review; protocol failed/No Protocol      Recent Fills: 11/29/2024, 02/24/2025    Last Rx Written: 11/29/2024    Last Office Visit: 04/30/2025

## 2025-06-05 DIAGNOSIS — S40.019D: ICD-10-CM

## 2025-06-05 DIAGNOSIS — S40.029D: ICD-10-CM

## 2025-06-05 NOTE — TELEPHONE ENCOUNTER
Please review. Protocol Failed; No Protocol      Recent fills: 3/28/2025  Last Rx written: 3/28/2025  Last office visit: 4/30/2025

## 2025-06-07 RX ORDER — TRAMADOL HYDROCHLORIDE 50 MG/1
50 TABLET ORAL EVERY 12 HOURS PRN
Qty: 20 TABLET | Refills: 0 | Status: SHIPPED | OUTPATIENT
Start: 2025-06-07

## 2025-06-11 ENCOUNTER — OFFICE VISIT (OUTPATIENT)
Dept: PAIN CLINIC | Facility: HOSPITAL | Age: OVER 89
End: 2025-06-11
Attending: ANESTHESIOLOGY
Payer: MEDICARE

## 2025-06-11 VITALS
BODY MASS INDEX: 27 KG/M2 | DIASTOLIC BLOOD PRESSURE: 61 MMHG | OXYGEN SATURATION: 91 % | SYSTOLIC BLOOD PRESSURE: 126 MMHG | WEIGHT: 145 LBS | HEART RATE: 81 BPM

## 2025-06-11 DIAGNOSIS — M79.10 MYALGIA: Primary | ICD-10-CM

## 2025-06-11 DIAGNOSIS — G58.8 INTERCOSTAL NEURALGIA: ICD-10-CM

## 2025-06-11 DIAGNOSIS — G62.9 NEUROPATHY: ICD-10-CM

## 2025-06-11 PROCEDURE — 99203 OFFICE O/P NEW LOW 30 MIN: CPT | Performed by: ANESTHESIOLOGY

## 2025-06-11 RX ORDER — PREGABALIN 25 MG/1
25 CAPSULE ORAL EVERY 12 HOURS PRN
Qty: 30 CAPSULE | Refills: 0 | Status: SHIPPED | OUTPATIENT
Start: 2025-06-11 | End: 2025-06-26

## 2025-06-11 NOTE — CHRONIC PAIN
[facility]  Report of Consultation    Beronica Hernandez Patient Status:  Outpatient    1934 MRN [id number]   Location [facility] Attending Orville Lerner MD     PCP Carole Cutler MD       Date of Consult:  2025    Reason for Consultation:   1. Myalgia of left serratus anterior muscle    2. Neuropathy    3. Intercostal neuralgia          History of Present Illness:  Beronica Hernandez is a a(n) 89 year old female.  Who was previously seen in the pain clinic in 2019 after undergoing a left-sided chest wall cyst excision and reports that the surgeon cut the nerve and she developed neuralgia.  She underwent trigger point injections as well as intercostal nerve blocks without relief.  She also states she tried Neurontin Lyrica and Cymbalta but could not tolerate but she did not note that there was any rashes with these medications.  He has been using Salonpas with minimal relief and ice.  She recently was prescribed tramadol and tried 1 pill.  She reported minimal relief.    History:  Past Medical History[1]  Past Surgical History[2]  Family History[3]   reports that she has never smoked. She has never been exposed to tobacco smoke. She has never used smokeless tobacco. She reports that she does not currently use alcohol. She reports that she does not use drugs.    Allergies:  Allergies[4]    Medications:  Current Hospital Medications[5]  Scheduled Meds:Scheduled Medications[6]  Continuous Infusions:Medication Infusions[7]  PRN Meds:.PRN Medications[8]    Review of Systems:  Pertinent items are noted in HPI.    Physical Exam:  Blood pressure 126/61, pulse 81, weight 145 lb (65.8 kg), SpO2 91%.  General: Alert and oriented x3, NAD, appears stated age, appropriate disposition and demeanor, answers questions appropriately appears to be comfortable in bed NAD  Head: normocephalic, atraumatic  Eyes: anicteric; no injection  Ears: no obvious deformities  noted   Nose: externally grossly within normal limits, no unusual discharge or rhinorrhea   Throat: lips grossly within normal limits by visual exam externally  Neck: supple, trachea midline, no obvious JVD  Chest: S1, S2, RRR, no obvious visual deformity  Respiratory: CTAB  Abdomen: soft, non-tender, bowel sounds present,   Laboratory Data:       Imaging:  PROCEDURE: XR RIBS WITH CHEST (3 VIEWS), LEFT (CPT=71101)     COMPARISON: [facility], XR RIBS WITH CHEST, BILATERAL (VCZ=83330), 11/06/2023, 2:24 PM.     INDICATIONS: Left axillary rib pain 3-4 weeks post fall.     TECHNIQUE:   Four views.       FINDINGS:     BONES: Normal.  No significant arthropathy or acute abnormality.    SOFT TISSUES: Negative.  No visible soft tissue swelling.    LUNGS: Normal    PLEURA: Normal.  OTHER: Negative.               Impression   CONCLUSION: No radiographic evidence of acute cardiopulmonary process or displaced left rib fracture.           Dictated by (CST): Dakota Jorge MD on 3/26/2025 at 2:22 PM       Narrative   PROCEDURE: MRI SPINE THORACIC (CPT=72146)     COMPARISON: [facility], MRI SPINE THORACIC (CPT=72146), 5/10/2021, 10:32 AM.     INDICATIONS: G89.29 Chronic midline thoracic back pain M54.6 Chronic midline thoracic back pain     TECHNIQUE: A variety of imaging planes and parameters were utilized for visualization of suspected pathology.  Images were performed without contrast.     Counting reference: The first visualized rib is consider T1. There are 12 paired ribs.       FINDINGS:  PARASPINAL AREA: Normal with no visible mass.    BONES: Mild degenerative endplate changes seen throughout the thoracic spine. There is no acute fracture or dislocation.  CORD: Normal caliber, contour, and signal intensity.    DISCS: Early degenerative disc disease is present without focal protrusion or neural impingement.  Small central disc protrusions at T8-T9 and T9-T10 are unchanged.     OTHER: Negative.                 Impression   CONCLUSION:     Mild degenerative disc disease in the thoracic spine without high-grade canal or foraminal narrowing.        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  After prolonged discussion with the patient we will attempt a low-dose of Lyrica 25 mg twice daily as needed and we will have her follow-up in the pain clinic in approximately 2 to 3 weeks.      Comprehensive analgesic plan was formulated. Conservative vs. Aggressive measures were discussed at length including pharmacotherapy (eg. Anti- inflammatories, muscle relaxants, neuropathic medications, oral steroids, analgesics), injections, and further testing. Risks and benefits of all options were discussed at length to patients satisfaction during a comprehensive interactive discussion. All questions were answered during extended questions and answer session. Patient agreeable to discussion plan. Greater than 50% of the time was spent with counseling (nature of discussion centered around pain, therapy, and treatment options), face to face time, time spent reviewing data, obtaining patient information and discussing the care with the patients health care providers.     Thank you for allowing me to participate in the care of your patient.    Total time: 36 min    SUSHIL HENDRICKSON MD  6/11/2025  Anest[facility] Chronic Pain Service [id number]       [1]   Past Medical History:   Actinic keratosis    right ala    Actinic keratosis    right nasal dorsum    Brain tumor (HCC)    Hyperlipidemia    Meningioma (HCC)    Skin cancer    left hand   [2]   Past Surgical History:  Procedure Laterality Date    Appendectomy      Cyst aspiration left      breast    Hysterectomy      TVH due to prolapse    Knee scope,diagnostic      right knee    Other surgical history Left 2013    Excision of skin cancer with grafting   [3]   Family History  Problem Relation Age  of Onset    Diabetes Mother    [4]   Allergies  Allergen Reactions    Penicillins HALLUCINATION    Lyrica [Pregabalin] RASH    Bactrim Ds RASH    Norco [Hydrocodone-Acetaminophen] RASH     1/16/23 Currently taking Norco prn without any side effects    Sulfamethoxazole W/Trimethoprim RASH   [5] No current facility-administered medications for this visit.  [6] [7] [8] [9]   Patient Active Problem List  Diagnosis    Hand weakness    Ulnar neuropathy bilaterally    Left-sided chest wall pain    Shoulder weakness of the left scapular stabilizers    Myalgia of left serratus anterior muscle    Meningioma, cerebral (HCC)

## 2025-06-11 NOTE — PROGRESS NOTES
Patient presents in office today with reported pain in L chest/Breast     Current pain level reported = 9/10    Last reported dose of Tradmadol 06.09.25      Narcotic Contract renewal new patient     06.11.25- NP-Neuralgia-Omayra-Belavic     \"Feels like someone is taking a hot iron and putting it on my breast\"    Has patient been flagged as fall risk:   Yes    TOP FALL PREVENTION TIPS    INSIDE YOUR HOME    KITCHEN:  Use non skid mats only.    Clean up spills as soon as they happen.  Keep objects that you use often within easy reach.  BATHROOM:  Install grab bars on the bathroom walls beside the tub, shower and toilet.  Use a non skid rubber mat in the tub/shower.  If you are unsteady on your feet you may want to use a shower chair/bench and a hand held shower head while bathing/showering.  BEDROOM:  Place light switches within reach of your bed and a night light between the bedroom and bathroom.  Get up slowly from lying down or sitting if you get dizzy.  Keep a working flashlight near your bed.  STAIRS:  Keep stairwells well lit with light switches at top and bottom.  Install sturdy handrails on both sides.  Make sure carpeting is secure.  FLOORS:  Remove all loose wires, cords and throw rugs.  Keep floors clear of clutter.  Make sure carpets and area rugs have skid proof backing.  Do not use slippery wax on bare floors.  Keep furniture in its accustomed place.   If you have pets, be careful that you don’t trip over them.    OUTSIDE SAFETY TIPS  Always wear good shoes with proper support and traction.  Always use hand rails on stairs and escalators.  Cover porch steps with gritty weather proof paint.  Pay attention to curbs and other changes in surfaces when out in the community.  Take care when walking on gravel or grassy surfaces.  Avoid walking on snowy or icy surfaces.  Use a cane or walker (indoors and out) if you are unsteady on your feet.

## 2025-06-11 NOTE — PATIENT INSTRUCTIONS
Refill policies:    Allow 2-3 business days for refills; controlled substances may take longer.  Contact your pharmacy at least 5 days prior to running out of medication and have them send an electronic request or submit request through the “request refill” option in your [employer] account.  Refills are not addressed on weekends; covering physicians do not authorize routine medications on weekends.  No narcotics or controlled substances are refilled after noon on Fridays or by on call physicians.  By law, narcotics must be electronically prescribed.  A 30 day supply with no refills is the maximum allowed.  If your prescription is due for a refill, you may be due for a follow up appointment.  To best provide you care, patients receiving routine medications need to be seen at least once a year.  Patients receiving narcotic/controlled substance medications need to be seen at least once every 3 months.  In the event that your preferred pharmacy does not have the requested medication in stock (e.g. Backordered), it is your responsibility to find another pharmacy that has the requested medication available.  We will gladly send a new prescription to that pharmacy at your request.    Scheduling Tests:    If your physician has ordered radiology tests such as MRI or CT scans, please contact Central Scheduling at [phone] right away to schedule the test.  Once scheduled, the [facility] Centralized Referral Team will work with your insurance carrier to obtain pre-certification or prior authorization.  Depending on your insurance carrier, approval may take 3-10 days.  It is highly recommended patients assure they have received an authorization before having a test performed.  If test is done without insurance authorization, patient may be responsible for the entire amount billed.      Precertification and Prior Authorizations:  If your physician has recommended that you have a procedure or additional testing performed the [facility]  Centralized Referral Team will contact your insurance carrier to obtain pre-certification or prior authorization.    You are strongly encouraged to contact your insurance carrier to verify that your procedure/test has been approved and is a COVERED benefit.  Although the [facility] Centralized Referral Team does its due diligence, the insurance carrier gives the disclaimer that \"Although the procedure is authorized, this does not guarantee payment.\"    Ultimately the patient is responsible for payment.   Thank you for your understanding in this matter.  Paperwork Completion:  If you require FMLA or disability paperwork for your recovery, please make sure to either drop it off or have it faxed to our office at [phone]. Be sure the form has your name and date of birth on it.  The form will be faxed to our Forms Department and they will complete it for you.  There is a 25$ fee for all forms that need to be filled out.  Please be aware there is a 10-14 day turnaround time.  You will need to sign a release of information (DIANNE) form if your paperwork does not come with one.  You may call the Forms Department with any questions at [phone].  Their fax number is [phone].

## 2025-06-30 ENCOUNTER — OFFICE VISIT (OUTPATIENT)
Dept: PAIN CLINIC | Facility: HOSPITAL | Age: OVER 89
End: 2025-06-30
Attending: NURSE PRACTITIONER
Payer: MEDICARE

## 2025-06-30 VITALS — SYSTOLIC BLOOD PRESSURE: 114 MMHG | DIASTOLIC BLOOD PRESSURE: 68 MMHG | OXYGEN SATURATION: 91 % | HEART RATE: 77 BPM

## 2025-06-30 DIAGNOSIS — M79.10 MYALGIA: ICD-10-CM

## 2025-06-30 DIAGNOSIS — D32.0 MENINGIOMA, CEREBRAL (HCC): ICD-10-CM

## 2025-06-30 DIAGNOSIS — R07.89 LEFT-SIDED CHEST WALL PAIN: Primary | ICD-10-CM

## 2025-06-30 DIAGNOSIS — G56.22 ULNAR NEUROPATHY OF LEFT UPPER EXTREMITY: ICD-10-CM

## 2025-06-30 PROCEDURE — 99214 OFFICE O/P EST MOD 30 MIN: CPT | Performed by: NURSE PRACTITIONER

## 2025-06-30 RX ORDER — PREGABALIN 25 MG/1
25 CAPSULE ORAL 2 TIMES DAILY
Qty: 60 CAPSULE | Refills: 0 | Status: SHIPPED | OUTPATIENT
Start: 2025-06-30 | End: 2025-07-30

## 2025-06-30 NOTE — PATIENT INSTRUCTIONS
Refill policies:    Allow 2-3 business days for refills; controlled substances may take longer.  Contact your pharmacy at least 5 days prior to running out of medication and have them send an electronic request or submit request through the “request refill” option in your [employer] account.  Refills are not addressed on weekends; covering physicians do not authorize routine medications on weekends.  No narcotics or controlled substances are refilled after noon on Fridays or by on call physicians.  By law, narcotics must be electronically prescribed.  A 30 day supply with no refills is the maximum allowed.  If your prescription is due for a refill, you may be due for a follow up appointment.  To best provide you care, patients receiving routine medications need to be seen at least once a year.  Patients receiving narcotic/controlled substance medications need to be seen at least once every 3 months.  In the event that your preferred pharmacy does not have the requested medication in stock (e.g. Backordered), it is your responsibility to find another pharmacy that has the requested medication available.  We will gladly send a new prescription to that pharmacy at your request.    Scheduling Tests:    If your physician has ordered radiology tests such as MRI or CT scans, please contact Central Scheduling at [phone] right away to schedule the test.  Once scheduled, the [facility] Centralized Referral Team will work with your insurance carrier to obtain pre-certification or prior authorization.  Depending on your insurance carrier, approval may take 3-10 days.  It is highly recommended patients assure they have received an authorization before having a test performed.  If test is done without insurance authorization, patient may be responsible for the entire amount billed.      Precertification and Prior Authorizations:  If your physician has recommended that you have a procedure or additional testing performed the [facility]  Centralized Referral Team will contact your insurance carrier to obtain pre-certification or prior authorization.    You are strongly encouraged to contact your insurance carrier to verify that your procedure/test has been approved and is a COVERED benefit.  Although the [facility] Centralized Referral Team does its due diligence, the insurance carrier gives the disclaimer that \"Although the procedure is authorized, this does not guarantee payment.\"    Ultimately the patient is responsible for payment.   Thank you for your understanding in this matter.  Paperwork Completion:  If you require FMLA or disability paperwork for your recovery, please make sure to either drop it off or have it faxed to our office at [phone]. Be sure the form has your name and date of birth on it.  The form will be faxed to our Forms Department and they will complete it for you.  There is a 25$ fee for all forms that need to be filled out.  Please be aware there is a 10-14 day turnaround time.  You will need to sign a release of information (DIANNE) form if your paperwork does not come with one.  You may call the Forms Department with any questions at [phone].  Their fax number is [phone].

## 2025-06-30 NOTE — CHRONIC PAIN
Interventional Pain Medicine     CC: Left Intercostal pain     S:Patient is a 89-year-old female, here for evaluation of left intercostal neuralgia pain  Patient had a left cyst excision many years ago.  She was recently seen by Dr. Coreen Macario 2 weeks ago and started on Lyrica, low-dose 25 mg twice a day, which she reports slightly did help decrease the intensity of the burning pain.      However currently patient states that she still has a lot of pain when she is trying to lift her left arm above her head.  She did have a nerve block in 2019 with Dr. Lerner which she recalls might have helped her since the pain did not really return until this year.    The patient specifically denies weight loss, changes in bathroom habits, fever, or chills.    Last OV:  Beronica Hernandez is a a(n) 89 year old female.  Who was previously seen in the pain clinic in 2019 after undergoing a left-sided chest wall cyst excision and reports that the surgeon cut the nerve and she developed neuralgia.  She underwent trigger point injections as well as intercostal nerve blocks without relief.  She also states she tried Neurontin Lyrica and Cymbalta but could not tolerate but she did not note that there was any rashes with these medications.  He has been using Salonpas with minimal relief and ice.  She recently was prescribed tramadol and tried 1 pill.  She reported minimal relief.       Last OV:    Current Medications[1]    Allergies[2]    Past Medical History[3]    Problem List[4]    Past Surgical History[5]    Social History     Socioeconomic History    Marital status:      Spouse name: Not on file    Number of children: Not on file    Years of education: Not on file    Highest education level: Not on file   Occupational History    Not on file   Tobacco Use    Smoking status: Never     Passive exposure: Never    Smokeless tobacco: Never   Vaping Use    Vaping status: Never Used   Substance and Sexual Activity    Alcohol use:  Not Currently     Comment: 1 beer per month    Drug use: No    Sexual activity: Not on file   Other Topics Concern     Service Not Asked    Blood Transfusions Not Asked    Caffeine Concern No     Comment: 2 cups coffee daily    Occupational Exposure Not Asked    Hobby Hazards Not Asked    Sleep Concern Not Asked    Stress Concern Not Asked    Weight Concern Not Asked    Special Diet Not Asked    Back Care Not Asked    Exercise Yes     Comment: walking    Bike Helmet Not Asked    Seat Belt Not Asked    Self-Exams Not Asked    Grew up on a farm Not Asked    History of tanning Not Asked    Outdoor occupation No    Breast feeding No    Reaction to local anesthetic No    Pt has a pacemaker No    Pt has a defibrillator No   Social History Narrative    The patient does not use an assistive device..      The patient does live in a home with stairs.     Social Drivers of Health     Food Insecurity: Not on file   Transportation Needs: Not on file   Housing Stability: Not on file       ROS:  Constitutional: denies weight loss, night sweats, fatigue  Eyes: denies visual changes, headache, eye pain, double vision  Ears, nose, mouth, and throat: denies runny nose, frequent nose bleeds, stuffy ears, ear pain, gingival bleeding, sore throat, gingival bleeding  Cardiovascular: denies chest pain, shortness of breath, orthopnea, palpitations, loss of consciousness  Respiratory: denies cough, sputum, wheezing, shortness of breath  Gastrointestinal: denies abdominal pain, bloating, cramping, nausea/vomitting, constipation  Musculoskeletal: denies joint swelling, crepitus, arthritis. + pain  Integumentary: denies pruritis, rashes, lesions, excessive dryness and/or discoloration  Neurological: denies headache, weakness, numbness, pins and needles  Psychiatric: denies depression, changes in sleep patterns, anxiety, difficulty concentrating  Endocrine: denies tremor, sweaty, slow, tired, polydipsia, polyuria  Hematologic/Lymphatic:  denies gum bleeding, prolonged/excessive bleeding, petechiae  Allergic/Immunologic: denies difficulty breathing, swelling at the neck/groin    PHYSICAL EXAM:  /68 (BP Location: Left arm, Patient Position: Sitting, Cuff Size: adult)   Pulse 77   SpO2 91%     The patient is in no distress. The patient is alert and oriented times three.  Mood and affect are normal.  Examination of the patient's skin and nails is grossly normal.  HEENT: Head is normocephalic, nontraumatic, no pinpoint pupils  CV: regular rate, no pedal edema  Resp: no audible wheezing, normal respiratory effort  Abdomen: Soft, nondistended    Gait: nonantalgic, assistance with ambulation: cane    Negative point tenderness above the right T5-10 facet joints.  Positive point tenderness above the left T5-8 facet joints.  Negative right SI tenderness.  Negative left SI tenderness.  Negative bilateral greater trochanteric bursa tenderness.  Negative straight leg raise testing on right at 10 degrees as it recreates the radicular symptoms.  Negative straight leg raise testing on left at 10 degrees as it recreates the radicular symptoms  5/5 right lower extremity strength   5/5 left lower extremity strength   Sensation is grossly intact to light touch bilateral lower extremities    ASSESSMENT:  Left chest intercostal pain  Left shoulder pain     [place] Prescription Monitoring Program reviewed.    PLAN:  1. Injection Recommended: Left chest intercoatal nerve block (T7), local with us in office   2. Anticoagulation: None  3. Imaging: None  4. Physical Therapy: Continue HEP  5. Medications: Refill Lyrica 25 twice a day  6. Follow up: For above    A total of 36 minutes were spent face-to-face with the patient during this encounter and over half of that time was spent on counseling and coordination of care. We discussed in depth the importance of weight loss and exercise which includes physical therapy. I also educated the patient about lifestyle  modifications and proper body lifting mechanics.    JOSÉ Howell  Interventional Pain Management         [1]   Current Outpatient Medications   Medication Sig Dispense Refill    traMADol 50 MG Oral Tab Take 1 tablet (50 mg total) by mouth every 12 (twelve) hours as needed for Pain. 20 tablet 0    LORazepam 0.5 MG Oral Tab Take 1 tablet (0.5 mg total) by mouth nightly. 90 tablet 1    fluorometholone 0.1 % Ophthalmic Suspension Place 1 drop into both eyes in the morning and 1 drop before bedtime.     [2]   Allergies  Allergen Reactions    Penicillins HALLUCINATION    Lyrica [Pregabalin] RASH    Bactrim Ds RASH    Norco [Hydrocodone-Acetaminophen] RASH     1/16/23 Currently taking Norco prn without any side effects    Sulfamethoxazole W/Trimethoprim RASH   [3]   Past Medical History:   Actinic keratosis    right ala    Actinic keratosis    right nasal dorsum    Brain tumor (HCC)    Hyperlipidemia    Meningioma (HCC)    Skin cancer    left hand   [4]   Patient Active Problem List  Diagnosis    Hand weakness    Ulnar neuropathy bilaterally    Left-sided chest wall pain    Shoulder weakness of the left scapular stabilizers    Myalgia of left serratus anterior muscle    Meningioma, cerebral (HCC)   [5]   Past Surgical History:  Procedure Laterality Date    Appendectomy      Cyst aspiration left      breast    Hysterectomy      TVH due to prolapse    Knee scope,diagnostic      right knee    Other surgical history Left 2013    Excision of skin cancer with grafting

## 2025-06-30 NOTE — PROGRESS NOTES
Patient presents in office today with reported pain in L chest/Breast      Current pain level reported = 8/10     Last reported dose of Pregabalin 06.27.25        Narcotic Contract renewal new patient      06.11.25- NP-Neuralgia-Omayra-Belavic      \"Feels like someone is taking a hot iron and putting it on my breast\"     Has patient been flagged as fall risk:   Yes     TOP FALL PREVENTION TIPS     INSIDE YOUR HOME     KITCHEN:  Use non skid mats only.    Clean up spills as soon as they happen.  Keep objects that you use often within easy reach.  BATHROOM:  Install grab bars on the bathroom walls beside the tub, shower and toilet.  Use a non skid rubber mat in the tub/shower.  If you are unsteady on your feet you may want to use a shower chair/bench and a hand held shower head while bathing/showering.  BEDROOM:  Place light switches within reach of your bed and a night light between the bedroom and bathroom.  Get up slowly from lying down or sitting if you get dizzy.  Keep a working flashlight near your bed.  STAIRS:  Keep stairwells well lit with light switches at top and bottom.  Install sturdy handrails on both sides.  Make sure carpeting is secure.  FLOORS:  Remove all loose wires, cords and throw rugs.  Keep floors clear of clutter.  Make sure carpets and area rugs have skid proof backing.  Do not use slippery wax on bare floors.  Keep furniture in its accustomed place.   If you have pets, be careful that you don’t trip over them.     OUTSIDE SAFETY TIPS  Always wear good shoes with proper support and traction.  Always use hand rails on stairs and escalators.  Cover porch steps with gritty weather proof paint.  Pay attention to curbs and other changes in surfaces when out in the community.  Take care when walking on gravel or grassy surfaces.  Avoid walking on snowy or icy surfaces.  Use a cane or walker (indoors and out) if you are unsteady on your feet.

## 2025-07-18 ENCOUNTER — OFFICE VISIT (OUTPATIENT)
Dept: PAIN CLINIC | Facility: HOSPITAL | Age: OVER 89
End: 2025-07-18
Attending: ANESTHESIOLOGY
Payer: MEDICARE

## 2025-07-18 VITALS — OXYGEN SATURATION: 92 % | HEART RATE: 84 BPM | DIASTOLIC BLOOD PRESSURE: 69 MMHG | SYSTOLIC BLOOD PRESSURE: 106 MMHG

## 2025-07-18 DIAGNOSIS — G62.9 NEUROPATHY: ICD-10-CM

## 2025-07-18 DIAGNOSIS — R07.89 LEFT-SIDED CHEST WALL PAIN: Primary | ICD-10-CM

## 2025-07-18 DIAGNOSIS — G58.8 INTERCOSTAL NEURALGIA: ICD-10-CM

## 2025-07-18 PROCEDURE — 64420 NJX AA&/STRD NTRCOST NRV 1: CPT | Performed by: ANESTHESIOLOGY

## 2025-07-18 PROCEDURE — 76942 ECHO GUIDE FOR BIOPSY: CPT | Performed by: ANESTHESIOLOGY

## 2025-07-18 NOTE — CHRONIC PAIN
[facility]  Pain Procedure Note    I have informed this Beronica Hernandez relative of the risks of injection including, but not limited to: failure, diaphragm paralysis, pneumothorax, toxicity, seizure, cardiac arrest, infection, bleeding, nerve damage. The patient desires the proposed injection as planned.    Procedure Type: Left intercostal nerve block T7 with ultrasound guidance  Prep:  Hand Washing, Time Out, Sterile Gloves  Site Prep: ChloraPrep  Position: Right lateral decubitus  Level: Left T7 intercostal nerve block posterior lateral border with ultrasound guidance and good visualization of the ribs and subcostal space  Local Anesthetic:  1/4% [id number]   Needle: 22-gauge ultrasound needle 3.5 inch  Technique:  Injection Through Needle  Attempts:  First Attempt  Injectate:  Depomedrol 20 mg mg  Injectate Volume: 0.5 mL  Narrative:  No Paresthesia  Complications:  No Complications  Success:  Good Pain Relief, Patient Tolerated Well      SUSHIL HENDRICKSON JR, MD    7/18/2025

## 2025-07-18 NOTE — PATIENT INSTRUCTIONS
Refill policies:    Allow 2-3 business days for refills; controlled substances may take longer.  Contact your pharmacy at least 5 days prior to running out of medication and have them send an electronic request or submit request through the “request refill” option in your [employer] account.  Refills are not addressed on weekends; covering physicians do not authorize routine medications on weekends.  No narcotics or controlled substances are refilled after noon on Fridays or by on call physicians.  By law, narcotics must be electronically prescribed.  A 30 day supply with no refills is the maximum allowed.  If your prescription is due for a refill, you may be due for a follow up appointment.  To best provide you care, patients receiving routine medications need to be seen at least once a year.  Patients receiving narcotic/controlled substance medications need to be seen at least once every 3 months.  In the event that your preferred pharmacy does not have the requested medication in stock (e.g. Backordered), it is your responsibility to find another pharmacy that has the requested medication available.  We will gladly send a new prescription to that pharmacy at your request.    Scheduling Tests:    If your physician has ordered radiology tests such as MRI or CT scans, please contact Central Scheduling at [phone] right away to schedule the test.  Once scheduled, the [facility] Centralized Referral Team will work with your insurance carrier to obtain pre-certification or prior authorization.  Depending on your insurance carrier, approval may take 3-10 days.  It is highly recommended patients assure they have received an authorization before having a test performed.  If test is done without insurance authorization, patient may be responsible for the entire amount billed.      Precertification and Prior Authorizations:  If your physician has recommended that you have a procedure or additional testing performed the [facility]  Centralized Referral Team will contact your insurance carrier to obtain pre-certification or prior authorization.    You are strongly encouraged to contact your insurance carrier to verify that your procedure/test has been approved and is a COVERED benefit.  Although the [facility] Centralized Referral Team does its due diligence, the insurance carrier gives the disclaimer that \"Although the procedure is authorized, this does not guarantee payment.\"    Ultimately the patient is responsible for payment.   Thank you for your understanding in this matter.  Paperwork Completion:  If you require FMLA or disability paperwork for your recovery, please make sure to either drop it off or have it faxed to our office at [phone]. Be sure the form has your name and date of birth on it.  The form will be faxed to our Forms Department and they will complete it for you.  There is a 25$ fee for all forms that need to be filled out.  Please be aware there is a 10-14 day turnaround time.  You will need to sign a release of information (DIANNE) form if your paperwork does not come with one.  You may call the Forms Department with any questions at [phone].  Their fax number is [phone].

## 2025-07-18 NOTE — PROGRESS NOTES
Patient presents in office today with reported pain in L chest/Breast      Current pain level reported = 9/10     Last reported dose of Tramadol yesterday         Narcotic Contract renewal NA     In-office ultrasound L T7     \"Feels like someone is taking a hot iron and putting it on my breast\"     Has patient been flagged as fall risk:   Yes     TOP FALL PREVENTION TIPS     INSIDE YOUR HOME     KITCHEN:  Use non skid mats only.    Clean up spills as soon as they happen.  Keep objects that you use often within easy reach.  BATHROOM:  Install grab bars on the bathroom walls beside the tub, shower and toilet.  Use a non skid rubber mat in the tub/shower.  If you are unsteady on your feet you may want to use a shower chair/bench and a hand held shower head while bathing/showering.  BEDROOM:  Place light switches within reach of your bed and a night light between the bedroom and bathroom.  Get up slowly from lying down or sitting if you get dizzy.  Keep a working flashlight near your bed.  STAIRS:  Keep stairwells well lit with light switches at top and bottom.  Install sturdy handrails on both sides.  Make sure carpeting is secure.  FLOORS:  Remove all loose wires, cords and throw rugs.  Keep floors clear of clutter.  Make sure carpets and area rugs have skid proof backing.  Do not use slippery wax on bare floors.  Keep furniture in its accustomed place.   If you have pets, be careful that you don’t trip over them.     OUTSIDE SAFETY TIPS  Always wear good shoes with proper support and traction.  Always use hand rails on stairs and escalators.  Cover porch steps with gritty weather proof paint.  Pay attention to curbs and other changes in surfaces when out in the community.  Take care when walking on gravel or grassy surfaces.  Avoid walking on snowy or icy surfaces.  Use a cane or walker (indoors and out) if you are unsteady on your feet.

## 2025-07-21 ENCOUNTER — TELEPHONE (OUTPATIENT)
Dept: PAIN CLINIC | Facility: CLINIC | Age: OVER 89
End: 2025-07-21

## 2025-07-21 ENCOUNTER — TELEPHONE (OUTPATIENT)
Dept: INTERNAL MEDICINE CLINIC | Facility: CLINIC | Age: OVER 89
End: 2025-07-21

## 2025-07-21 DIAGNOSIS — G89.29 CHRONIC LEFT-SIDED THORACIC BACK PAIN: Primary | ICD-10-CM

## 2025-07-21 DIAGNOSIS — M54.6 CHRONIC LEFT-SIDED THORACIC BACK PAIN: Primary | ICD-10-CM

## 2025-07-21 DIAGNOSIS — S40.029D: ICD-10-CM

## 2025-07-21 DIAGNOSIS — S40.019D: ICD-10-CM

## 2025-07-21 RX ORDER — METHYLPREDNISOLONE ACETATE 40 MG/ML
40 INJECTION, SUSPENSION INTRA-ARTICULAR; INTRALESIONAL; INTRAMUSCULAR; SOFT TISSUE ONCE
Status: ACTIVE | OUTPATIENT
Start: 2025-07-18

## 2025-07-21 RX ORDER — BUPIVACAINE HYDROCHLORIDE 2.5 MG/ML
5 INJECTION, SOLUTION EPIDURAL; INFILTRATION; INTRACAUDAL; PERINEURAL ONCE
Status: ACTIVE | OUTPATIENT
Start: 2025-07-18

## 2025-07-21 NOTE — TELEPHONE ENCOUNTER
Patient called stating that she had a procedure on 7/18.    Patient stated that she is in so much pain and feels the Dr placed the needle on her side.    Patient explained that the original pain was coming from her left breast, which is where she is still having the horrible pain.    Patient asked that RN call her back today/

## 2025-07-22 NOTE — TELEPHONE ENCOUNTER
Spoke with patient's daughter. Who stated that pt is still experiencing pain after Intercostal Nerve Block on on 7/18.     Advised pt that I would call her to check in as well. Can possibly schedule f/u in office.

## 2025-07-23 NOTE — TELEPHONE ENCOUNTER
Spoke with pt, appt booked for 8/26. Pt understands to call sooner if any later appts on that date.

## 2025-07-23 NOTE — TELEPHONE ENCOUNTER
For replies, please route to pool: [facility] CENTRAL REFILLS    Please review: medication fails/has no protocol attached.  No future appointments with primary care medicine  last office visit: 4/30/25    Recent fill dates: 6/7/25, and 3/28/25  Date of  last written prescription: 6/7/25   Last dispensed quantity: #10 each and processed as a 20 day supply

## 2025-07-24 ENCOUNTER — TELEPHONE (OUTPATIENT)
Dept: INTERNAL MEDICINE CLINIC | Facility: CLINIC | Age: OVER 89
End: 2025-07-24

## 2025-07-24 RX ORDER — TRAMADOL HYDROCHLORIDE 50 MG/1
50 TABLET ORAL EVERY 12 HOURS PRN
Qty: 20 TABLET | Refills: 1 | Status: SHIPPED | OUTPATIENT
Start: 2025-07-24

## 2025-07-24 NOTE — TELEPHONE ENCOUNTER
Message on patient's phone #[phone] to call office and confirmed that she does need prescription for tramadol.  During last visit she told me that she is not taking medication that is why I am not refilling medication may be I misunderstood her.  Phone number that starts from [phone] it is her daughter's phone number

## 2025-07-24 NOTE — TELEPHONE ENCOUNTER
Patient called states she would like the Tramadol refill as she needs it for pain PRN, she takes it every 3 days or so, she adds she does not abuse the medication. She states nothing else helps. I made her aware I will convey the above to Dr. Cutler. Patient verbalized understanding. No further questions or concerns at this time.    Dr. Cutler - please advise

## 2025-08-26 ENCOUNTER — OFFICE VISIT (OUTPATIENT)
Dept: PAIN CLINIC | Facility: HOSPITAL | Age: OVER 89
End: 2025-08-26
Attending: ANESTHESIOLOGY

## 2025-08-26 VITALS — SYSTOLIC BLOOD PRESSURE: 138 MMHG | HEART RATE: 74 BPM | DIASTOLIC BLOOD PRESSURE: 75 MMHG | OXYGEN SATURATION: 92 %

## 2025-08-26 DIAGNOSIS — G62.9 NEUROPATHY: ICD-10-CM

## 2025-08-26 DIAGNOSIS — R07.89 LEFT-SIDED CHEST WALL PAIN: Primary | ICD-10-CM

## 2025-08-26 PROCEDURE — 99213 OFFICE O/P EST LOW 20 MIN: CPT | Performed by: ANESTHESIOLOGY

## 2025-08-26 PROCEDURE — 20553 NJX 1/MLT TRIGGER POINTS 3/>: CPT | Performed by: ANESTHESIOLOGY

## 2025-08-26 RX ORDER — METHYLPREDNISOLONE ACETATE 40 MG/ML
40 INJECTION, SUSPENSION INTRA-ARTICULAR; INTRALESIONAL; INTRAMUSCULAR; SOFT TISSUE ONCE
Status: ACTIVE | OUTPATIENT
Start: 2025-08-26

## 2025-08-26 RX ORDER — BUPIVACAINE HYDROCHLORIDE 2.5 MG/ML
5 INJECTION, SOLUTION EPIDURAL; INFILTRATION; INTRACAUDAL; PERINEURAL ONCE
Status: ACTIVE | OUTPATIENT
Start: 2025-08-26

## 2025-08-26 RX ORDER — METHYLPREDNISOLONE ACETATE 40 MG/ML
40 INJECTION, SUSPENSION INTRA-ARTICULAR; INTRALESIONAL; INTRAMUSCULAR; SOFT TISSUE ONCE
Status: COMPLETED | OUTPATIENT
Start: 2025-08-26 | End: 2025-08-26

## 2025-08-26 RX ORDER — BUPIVACAINE HYDROCHLORIDE 2.5 MG/ML
5 INJECTION, SOLUTION EPIDURAL; INFILTRATION; INTRACAUDAL; PERINEURAL ONCE
Status: COMPLETED | OUTPATIENT
Start: 2025-08-26 | End: 2025-08-26

## 2025-08-26 RX ADMIN — BUPIVACAINE HYDROCHLORIDE 5 ML: 2.5 INJECTION, SOLUTION EPIDURAL; INFILTRATION; INTRACAUDAL; PERINEURAL at 11:21:00

## 2025-08-26 RX ADMIN — METHYLPREDNISOLONE ACETATE 40 MG: 40 INJECTION, SUSPENSION INTRA-ARTICULAR; INTRALESIONAL; INTRAMUSCULAR; SOFT TISSUE at 11:22:00

## 2025-08-28 ENCOUNTER — TELEPHONE (OUTPATIENT)
Dept: PAIN CLINIC | Facility: HOSPITAL | Age: OVER 89
End: 2025-08-28

## 2025-08-28 DIAGNOSIS — R07.89 LEFT-SIDED CHEST WALL PAIN: ICD-10-CM

## 2025-08-28 DIAGNOSIS — M79.10 MYALGIA: ICD-10-CM

## 2025-08-29 RX ORDER — PREGABALIN 25 MG/1
25 CAPSULE ORAL 2 TIMES DAILY
Qty: 60 CAPSULE | Refills: 0 | Status: SHIPPED | OUTPATIENT
Start: 2025-08-29 | End: 2025-09-28

## (undated) NOTE — LETTER
AUTHORIZATION FOR SURGICAL OPERATION OR OTHER PROCEDURE    1. I hereby authorize Dr. Orville Lerner, and [facility] staff assigned to my case to perform the following operation and/or procedure at the [facility] site:    _Left chest intercostal nerve block (T7), local with us in office      _______________________________________________________________________________________________    2.  My physician has explained the nature and purpose of the operation or other procedure, possible alternative methods of treatment, the risks involved, and the possibility of complication to me.  I acknowledge that no guarantee has been made as to the result that may be obtained.  3.  I recognize that, during the course of this operation, or other procedure, unforseen conditions may necessitate additional or different procedure than those listed above.  I, therefore, further authorize and request that the above named physician, his/her physician assistants or designees perform such procedures as are, in his/her professional opinion, necessary and desirable.  4.  Any tissue or organs removed in the operation or other procedure may be disposed of by and at the discretion of the [facility].  5.  I understand that in the event of a medical emergency, I will be transported by local paramedics to [facility] or other hospital emergency department.  6.  I certify that I have read and fully understand the above consent to operation and/or other procedure.    7.  I acknowledge that my physician has explained sedation/analgesia administration to me including the risks and benefits.  I consent to the administration of sedation/analgesia as may be necessary or desirable in the judgement of my physician.    Witness signature: ___________________________________________________ Date:  ______/______/_____                    Time:  ________ A.M.  P.M.       Patient  Name:  ______________________________________________________  (please print)      Patient signature:  ___________________________________________________             Relationship to Patient:           []  Parent    Responsible person                          []  Spouse  In case of minor or                    [] Other  _____________   Incompetent name:  __________________________________________________                               (please print)      _____________      Responsible person  In case of minor or  Incompetent signature:  _______________________________________________    Statement of Physician  My signature below affirms that prior to the time of the procedure, I have explained to the patient and/or his/her guardian, the risks and benefits involved in the proposed treatment and any reasonable alternative to the proposed treatment.  I have also explained the risks and benefits involved in the refusal of the proposed treatment and have answered the patient's questions.                        Date:  ______/______/_______  Provider                      Signature:  __________________________________________________________       Time:  ___________ A.M    P.M.

## (undated) NOTE — Clinical Note
Puma Chapman I saw Jessica People today with prolapse and urinary incontinence. I've recommended bladder testing while she considers her options. I will work to manage her sx. I appreciate the opportunity to participate in her care.  Thanks, [employer]

## (undated) NOTE — ED AVS SNAPSHOT
Carlos A White   MRN: [id number]    Department:  [facility]   Date of Visit:  3/21/2020           Disclosure     Insurance plans vary and the physician(s) referred by the ER may not be covered by your plan.  Please conta within the next three months to obtain basic health screening including reassessment of your blood pressure.     IF THERE IS ANY CHANGE OR WORSENING OF YOUR CONDITION, CALL YOUR PRIMARY CARE PHYSICIAN AT ONCE OR RETURN IMMEDIATELY TO THE EMERGENCY DEPARTMEN

## (undated) NOTE — LETTER
09/13/21    [address] [place] [id number]      Dear [facility],    [facility] records indicate that you have outstanding lab work and or testing that was ordered for you and has not yet been completed:  Orders Placed This Encounter      Z